# Patient Record
Sex: FEMALE | Race: WHITE | Employment: OTHER | URBAN - METROPOLITAN AREA
[De-identification: names, ages, dates, MRNs, and addresses within clinical notes are randomized per-mention and may not be internally consistent; named-entity substitution may affect disease eponyms.]

---

## 2020-10-25 ENCOUNTER — HOSPITAL ENCOUNTER (EMERGENCY)
Age: 75
Discharge: HOME | End: 2020-10-25
Attending: EMERGENCY MEDICINE | Admitting: EMERGENCY MEDICINE
Payer: MEDICARE

## 2020-10-25 VITALS
HEIGHT: 59 IN | HEART RATE: 77 BPM | BODY MASS INDEX: 29.64 KG/M2 | TEMPERATURE: 97.5 F | DIASTOLIC BLOOD PRESSURE: 111 MMHG | OXYGEN SATURATION: 100 % | RESPIRATION RATE: 20 BRPM | SYSTOLIC BLOOD PRESSURE: 168 MMHG | WEIGHT: 147 LBS

## 2020-10-25 DIAGNOSIS — I10 ESSENTIAL HYPERTENSION: ICD-10-CM

## 2020-10-25 DIAGNOSIS — R04.0 EPISTAXIS: Primary | ICD-10-CM

## 2020-10-25 PROCEDURE — 74011000250 HC RX REV CODE- 250: Performed by: EMERGENCY MEDICINE

## 2020-10-25 PROCEDURE — 99284 EMERGENCY DEPT VISIT MOD MDM: CPT

## 2020-10-25 RX ORDER — TRANEXAMIC ACID 100 MG/ML
INJECTION, SOLUTION INTRAVENOUS
Status: DISCONTINUED
Start: 2020-10-25 | End: 2020-10-25 | Stop reason: WASHOUT

## 2020-10-25 RX ORDER — TRANEXAMIC ACID 100 MG/ML
INJECTION, SOLUTION INTRAVENOUS
Status: DISCONTINUED
Start: 2020-10-25 | End: 2020-10-25 | Stop reason: HOSPADM

## 2020-10-25 RX ADMIN — WATER 10 ML: 1 INJECTION INTRAMUSCULAR; INTRAVENOUS; SUBCUTANEOUS at 01:30

## 2020-10-25 NOTE — ED PROVIDER NOTES
EMERGENCY DEPARTMENT HISTORY AND PHYSICAL EXAM    Date: 10/25/2020  Patient Name: Cesar Allen    History of Presenting Illness     Chief Complaint   Patient presents with    Epistaxis         History Provided By: Patient    Cesar Allen is a 77 y.o. female who presents to the emergency department C/O nosebleed. Patient states the symptoms started earlier this week and have been going on on and off. She states she has never had nosebleeds before but does have a history of hypertension and diabetes. Denies being on any blood thinning medications. States she was seen at a different facility with [facility] on Tuesday and was treated with Afrin but did not have her nose packed as the bleeding had subsided. She states the bleeding started back up again tonight just about an hour prior to arrival.  No other complaints    PCP: Korey Dobbins MD        Past History     Past Medical History:  Past Medical History:   Diagnosis Date    Diabetes ([address]     Hypertension        Past Surgical History:  History reviewed. No pertinent surgical history. Family History:  History reviewed. No pertinent family history. Social History:  Social History     Tobacco Use    Smoking status: Current Every Day Smoker    Smokeless tobacco: Never Used   Substance Use Topics    Alcohol use: Not on file    Drug use: Not on file       Allergies: Allergies   Allergen Reactions    Codeine Other (comments)    Sulfa (Sulfonamide Antibiotics) Other (comments)         Review of Systems   Review of Systems   Constitutional: Negative for fever. HENT: Positive for nosebleeds. Negative for congestion, trouble swallowing and voice change. Respiratory: Negative for shortness of breath. Cardiovascular: Negative for chest pain. Gastrointestinal: Negative for abdominal pain, nausea and vomiting. All other systems reviewed and are negative. All other systems reviewed and are negative.     Physical Exam Vitals:    10/25/20 0125 10/25/20 0129   BP: (!) 168/111    Pulse: 77    Resp: 20    Temp:  97.5 °F (36.4 °C)   SpO2: 100%    Weight: 66.7 kg (147 lb)    Height: 4' 11\" (1.499 m)      Physical Exam    Nursing notes and vital signs reviewed    Airway: intact, speaking normally  Breathing: No apparent distress, no cyanosis  Circulation: Peripheral pulses equal    Constitutional: Non toxic appearing, no acute distress, appearing stated age  HEENT:  Head: Normocephalic, Atraumatic  Eyes: PERRL, EOMI, No conjunctival injection  Ears: external ears normal  Nose: There is active epistaxis worse on the left side although there is a moderate amount of postnasal bleeding with the patient spitting up blood. Throat: mucous membranes moist, blood in the posterior oropharynx  Neck: symmetric, trachea midline, no obvious swelling, no JVD  Cardiovascular: Regular rate and rhythm, no murmurs  Lungs: Clear to ausculation bilaterally, No stridor, Normal work of breathing and chest excursion bilaterally  Abdomen: Soft, non tender, non distended, normoactive bowel sounds, No rigidity, no peritoneal signs  Musculoskeletal:  No evidence of obvious deformity to the back, neck or extremities, no LE edema  Skin: Warm, dry, No obvious rashes  Neuro: Alert and oriented x 3, CN 2-12 intact, normal speech, strength and sensation full and symmetric bilaterally  Psychiatric: Normal mood and affect      Diagnostic Study Results     Labs -   No results found for this or any previous visit (from the past 72 hour(s)).     Radiologic Studies -   No orders to display     CT Results  (Last 48 hours)    None        CXR Results  (Last 48 hours)    None          Medications given in the ED-  Medications   tranexamic acid (CYKLOKAPRON) 5% in sterile water oral solution (10 mL IntraNASal Given 10/25/20 0130)         Medical Decision Making     I reviewed the vital signs, available nursing notes, past medical history, past surgical history, family history and social history. Vital Signs interpretation- I have reviewed the patient's vital signs. Pulse Oximetry interpretation - 100% on Room air     Cardiac Monitor interpretation:  Rate: 77 bpm  Rhythm: sinus    Records Reviewed: Nursing Notes and Old Medical Records    Procedures:  Procedures    ED Course & MDM:   Considering the patient has hypertension will try TXA rather than Afrin. Will consider packing the patient's nose if no improvement     Patient symptoms improved. No further bleeding. Patient to be discharged with outpatient follow up with her ENT doctor on Tuesday. Recommended come back to ED if symptoms recur or worsen. Diagnosis and Disposition         DISCHARGE NOTE:    Tal Jason  results have been reviewed with her. She has been counseled regarding her diagnosis, treatment, and plan. She verbally conveys understanding and agreement of the signs, symptoms, diagnosis, treatment and prognosis and additionally agrees to follow up as discussed. She also agrees with the care-plan and conveys that all of her questions have been answered. I have also provided discharge instructions for her that include: educational information regarding their diagnosis and treatment, and list of reasons why they would want to return to the ED prior to their follow-up appointment, should her condition change. She has been provided with education for proper emergency department utilization. CLINICAL IMPRESSION:    1. Epistaxis    2. Essential hypertension        PLAN:  1. D/C Home  2. There are no discharge medications for this patient.     3.   Follow-up Information     Follow up With Specialties Details Why Contact Info    Your ENT scheduled appointment  Go to       [facility] EMERGENCY DEPT Emergency Medicine Go to  If symptoms worsen [address] [facility] [address]  [phone]        _______________________________      Please note that this dictation was completed with brendon Thurman computer voice recognition software. Quite often unanticipated grammatical, syntax, homophones, and other interpretive errors are inadvertently transcribed by the computer software. Please disregard these errors. Please excuse any errors that have escaped final proofreading.

## 2020-10-25 NOTE — ED TRIAGE NOTES
Pt c/o epistaxis on and off since Tuesday;   Pt sts she was seen tues at [facility]

## 2022-03-18 PROBLEM — I10 ESSENTIAL HYPERTENSION: Status: ACTIVE | Noted: 2020-10-25

## 2022-03-20 PROBLEM — R04.0 EPISTAXIS: Status: ACTIVE | Noted: 2020-10-25

## 2023-04-08 ENCOUNTER — APPOINTMENT (OUTPATIENT)
Facility: HOSPITAL | Age: 78
DRG: 682 | End: 2023-04-08
Payer: MEDICARE

## 2023-04-08 ENCOUNTER — HOSPITAL ENCOUNTER (INPATIENT)
Facility: HOSPITAL | Age: 78
LOS: 9 days | Discharge: NURSING, REHAB OR LONG-TERM CARE | DRG: 682 | End: 2023-04-17
Attending: STUDENT IN AN ORGANIZED HEALTH CARE EDUCATION/TRAINING PROGRAM | Admitting: FAMILY MEDICINE
Payer: MEDICARE

## 2023-04-08 DIAGNOSIS — R29.6 FREQUENT FALLS: ICD-10-CM

## 2023-04-08 DIAGNOSIS — T79.6XXA TRAUMATIC RHABDOMYOLYSIS, INITIAL ENCOUNTER (HCC): Primary | ICD-10-CM

## 2023-04-08 DIAGNOSIS — N17.9 AKI (ACUTE KIDNEY INJURY) (HCC): ICD-10-CM

## 2023-04-08 DIAGNOSIS — F41.9 ANXIETY: ICD-10-CM

## 2023-04-08 PROBLEM — D64.9 ACUTE ANEMIA: Status: ACTIVE | Noted: 2023-04-08

## 2023-04-08 PROBLEM — M62.82 RHABDOMYOLYSIS: Status: ACTIVE | Noted: 2023-04-08

## 2023-04-08 PROBLEM — N39.0 UTI (URINARY TRACT INFECTION): Status: ACTIVE | Noted: 2023-04-08

## 2023-04-08 PROBLEM — R26.2 UNABLE TO AMBULATE: Status: ACTIVE | Noted: 2023-04-08

## 2023-04-08 PROBLEM — I10 ESSENTIAL HYPERTENSION: Status: ACTIVE | Noted: 2020-10-25

## 2023-04-08 PROBLEM — Z72.0 TOBACCO USE: Status: ACTIVE | Noted: 2023-04-08

## 2023-04-08 PROBLEM — E11.9 TYPE 2 DIABETES MELLITUS (HCC): Status: ACTIVE | Noted: 2023-04-08

## 2023-04-08 PROBLEM — N18.30 CKD STAGE 3 DUE TO TYPE 2 DIABETES MELLITUS (HCC): Status: ACTIVE | Noted: 2023-04-08

## 2023-04-08 PROBLEM — E11.22 CKD STAGE 3 DUE TO TYPE 2 DIABETES MELLITUS (HCC): Status: ACTIVE | Noted: 2023-04-08

## 2023-04-08 LAB
ALBUMIN SERPL-MCNC: 4.1 G/DL (ref 3.4–5)
ALBUMIN/GLOB SERPL: 1 (ref 0.8–1.7)
ALP SERPL-CCNC: 103 U/L (ref 45–117)
ALT SERPL-CCNC: 134 U/L (ref 13–56)
ANION GAP SERPL CALC-SCNC: 8 MMOL/L (ref 3–18)
APPEARANCE UR: CLEAR
AST SERPL-CCNC: 390 U/L (ref 10–38)
BACTERIA URNS QL MICRO: ABNORMAL /HPF
BASOPHILS # BLD: 0 K/UL (ref 0–0.1)
BASOPHILS NFR BLD: 0 % (ref 0–2)
BILIRUB SERPL-MCNC: 0.5 MG/DL (ref 0.2–1)
BILIRUB UR QL: NEGATIVE
BUN SERPL-MCNC: 58 MG/DL (ref 7–18)
BUN/CREAT SERPL: 17 (ref 12–20)
CALCIUM SERPL-MCNC: 10.1 MG/DL (ref 8.5–10.1)
CHLORIDE SERPL-SCNC: 110 MMOL/L (ref 100–111)
CK SERPL-CCNC: ABNORMAL U/L (ref 26–192)
CO2 SERPL-SCNC: 19 MMOL/L (ref 21–32)
COLOR UR: YELLOW
CREAT SERPL-MCNC: 3.36 MG/DL (ref 0.6–1.3)
DIFFERENTIAL METHOD BLD: ABNORMAL
EOSINOPHIL # BLD: 0 K/UL (ref 0–0.4)
EOSINOPHIL NFR BLD: 0 % (ref 0–5)
EPITH CASTS URNS QL MICRO: ABNORMAL /LPF (ref 0–5)
ERYTHROCYTE [DISTWIDTH] IN BLOOD BY AUTOMATED COUNT: 12.9 % (ref 11.6–14.5)
GLOBULIN SER CALC-MCNC: 4 G/DL (ref 2–4)
GLUCOSE BLD STRIP.AUTO-MCNC: 143 MG/DL (ref 70–110)
GLUCOSE SERPL-MCNC: 143 MG/DL (ref 74–99)
GLUCOSE UR STRIP.AUTO-MCNC: NEGATIVE MG/DL
HCT VFR BLD AUTO: 24.2 % (ref 35–45)
HGB BLD-MCNC: 8.2 G/DL (ref 12–16)
HGB UR QL STRIP: ABNORMAL
IMM GRANULOCYTES # BLD AUTO: 0.1 K/UL (ref 0–0.04)
IMM GRANULOCYTES NFR BLD AUTO: 0 % (ref 0–0.5)
KETONES UR QL STRIP.AUTO: NEGATIVE MG/DL
LACTATE SERPL-SCNC: 1.1 MMOL/L (ref 0.4–2)
LACTATE SERPL-SCNC: 1.3 MMOL/L (ref 0.4–2)
LEUKOCYTE ESTERASE UR QL STRIP.AUTO: ABNORMAL
LYMPHOCYTES # BLD: 1.5 K/UL (ref 0.9–3.6)
LYMPHOCYTES NFR BLD: 11 % (ref 21–52)
MAGNESIUM SERPL-MCNC: 2.2 MG/DL (ref 1.6–2.6)
MCH RBC QN AUTO: 32.8 PG (ref 24–34)
MCHC RBC AUTO-ENTMCNC: 33.9 G/DL (ref 31–37)
MCV RBC AUTO: 96.8 FL (ref 78–100)
MONOCYTES # BLD: 0.6 K/UL (ref 0.05–1.2)
MONOCYTES NFR BLD: 4 % (ref 3–10)
NEUTS SEG # BLD: 11.5 K/UL (ref 1.8–8)
NEUTS SEG NFR BLD: 84 % (ref 40–73)
NITRITE UR QL STRIP.AUTO: NEGATIVE
NRBC # BLD: 0 K/UL (ref 0–0.01)
NRBC BLD-RTO: 0 PER 100 WBC
PH UR STRIP: 6 (ref 5–8)
PLATELET # BLD AUTO: 331 K/UL (ref 135–420)
PMV BLD AUTO: 10.6 FL (ref 9.2–11.8)
POTASSIUM SERPL-SCNC: 4.4 MMOL/L (ref 3.5–5.5)
PROT SERPL-MCNC: 8.1 G/DL (ref 6.4–8.2)
PROT UR STRIP-MCNC: 300 MG/DL
RBC # BLD AUTO: 2.5 M/UL (ref 4.2–5.3)
RBC #/AREA URNS HPF: ABNORMAL /HPF (ref 0–5)
SARS-COV-2 RDRP RESP QL NAA+PROBE: NOT DETECTED
SODIUM SERPL-SCNC: 137 MMOL/L (ref 136–145)
SOURCE: NORMAL
SP GR UR REFRACTOMETRY: 1.01 (ref 1–1.03)
UROBILINOGEN UR QL STRIP.AUTO: 0.2 EU/DL (ref 0.2–1)
WBC # BLD AUTO: 13.7 K/UL (ref 4.6–13.2)
WBC URNS QL MICRO: ABNORMAL /HPF (ref 0–5)

## 2023-04-08 PROCEDURE — 2580000003 HC RX 258: Performed by: STUDENT IN AN ORGANIZED HEALTH CARE EDUCATION/TRAINING PROGRAM

## 2023-04-08 PROCEDURE — 2580000003 HC RX 258: Performed by: FAMILY MEDICINE

## 2023-04-08 PROCEDURE — 76705 ECHO EXAM OF ABDOMEN: CPT

## 2023-04-08 PROCEDURE — 72100 X-RAY EXAM L-S SPINE 2/3 VWS: CPT

## 2023-04-08 PROCEDURE — 6370000000 HC RX 637 (ALT 250 FOR IP): Performed by: FAMILY MEDICINE

## 2023-04-08 PROCEDURE — 87635 SARS-COV-2 COVID-19 AMP PRB: CPT

## 2023-04-08 PROCEDURE — 6360000002 HC RX W HCPCS: Performed by: FAMILY MEDICINE

## 2023-04-08 PROCEDURE — 6370000000 HC RX 637 (ALT 250 FOR IP): Performed by: STUDENT IN AN ORGANIZED HEALTH CARE EDUCATION/TRAINING PROGRAM

## 2023-04-08 PROCEDURE — 73521 X-RAY EXAM HIPS BI 2 VIEWS: CPT

## 2023-04-08 PROCEDURE — 96374 THER/PROPH/DIAG INJ IV PUSH: CPT

## 2023-04-08 PROCEDURE — 80053 COMPREHEN METABOLIC PANEL: CPT

## 2023-04-08 PROCEDURE — 85025 COMPLETE CBC W/AUTO DIFF WBC: CPT

## 2023-04-08 PROCEDURE — 83735 ASSAY OF MAGNESIUM: CPT

## 2023-04-08 PROCEDURE — 81001 URINALYSIS AUTO W/SCOPE: CPT

## 2023-04-08 PROCEDURE — 6360000002 HC RX W HCPCS: Performed by: STUDENT IN AN ORGANIZED HEALTH CARE EDUCATION/TRAINING PROGRAM

## 2023-04-08 PROCEDURE — 73560 X-RAY EXAM OF KNEE 1 OR 2: CPT

## 2023-04-08 PROCEDURE — 93005 ELECTROCARDIOGRAM TRACING: CPT | Performed by: STUDENT IN AN ORGANIZED HEALTH CARE EDUCATION/TRAINING PROGRAM

## 2023-04-08 PROCEDURE — 83540 ASSAY OF IRON: CPT

## 2023-04-08 PROCEDURE — 83605 ASSAY OF LACTIC ACID: CPT

## 2023-04-08 PROCEDURE — 87086 URINE CULTURE/COLONY COUNT: CPT

## 2023-04-08 PROCEDURE — 1100000000 HC RM PRIVATE

## 2023-04-08 PROCEDURE — 82550 ASSAY OF CK (CPK): CPT

## 2023-04-08 PROCEDURE — 99285 EMERGENCY DEPT VISIT HI MDM: CPT

## 2023-04-08 PROCEDURE — 87040 BLOOD CULTURE FOR BACTERIA: CPT

## 2023-04-08 PROCEDURE — 82962 GLUCOSE BLOOD TEST: CPT

## 2023-04-08 RX ORDER — ONDANSETRON 2 MG/ML
4 INJECTION INTRAMUSCULAR; INTRAVENOUS EVERY 6 HOURS PRN
Status: DISCONTINUED | OUTPATIENT
Start: 2023-04-08 | End: 2023-04-17 | Stop reason: HOSPADM

## 2023-04-08 RX ORDER — ATENOLOL 50 MG/1
50 TABLET ORAL
Status: DISCONTINUED | OUTPATIENT
Start: 2023-04-08 | End: 2023-04-17 | Stop reason: HOSPADM

## 2023-04-08 RX ORDER — ATENOLOL 50 MG/1
50 TABLET ORAL DAILY
COMMUNITY
Start: 2020-06-15

## 2023-04-08 RX ORDER — LOSARTAN POTASSIUM 100 MG/1
1 TABLET ORAL DAILY
Status: ON HOLD | COMMUNITY
Start: 2020-07-25 | End: 2023-04-17 | Stop reason: HOSPADM

## 2023-04-08 RX ORDER — ALPRAZOLAM 0.5 MG/1
1 TABLET ORAL 2 TIMES DAILY PRN
Status: DISCONTINUED | OUTPATIENT
Start: 2023-04-08 | End: 2023-04-12

## 2023-04-08 RX ORDER — PANTOPRAZOLE SODIUM 40 MG/1
40 TABLET, DELAYED RELEASE ORAL
Status: DISCONTINUED | OUTPATIENT
Start: 2023-04-09 | End: 2023-04-17 | Stop reason: HOSPADM

## 2023-04-08 RX ORDER — INSULIN LISPRO 100 [IU]/ML
0-4 INJECTION, SOLUTION INTRAVENOUS; SUBCUTANEOUS NIGHTLY
Status: DISCONTINUED | OUTPATIENT
Start: 2023-04-08 | End: 2023-04-13

## 2023-04-08 RX ORDER — ALPRAZOLAM 1 MG/1
1 TABLET ORAL 3 TIMES DAILY
Status: ON HOLD | COMMUNITY
Start: 2020-10-12 | End: 2023-04-17 | Stop reason: SDUPTHER

## 2023-04-08 RX ORDER — GUANFACINE 1 MG/1
2 TABLET ORAL DAILY
Status: DISCONTINUED | OUTPATIENT
Start: 2023-04-09 | End: 2023-04-17 | Stop reason: HOSPADM

## 2023-04-08 RX ORDER — DOXEPIN HYDROCHLORIDE 100 MG/1
100 CAPSULE ORAL NIGHTLY
COMMUNITY

## 2023-04-08 RX ORDER — ACETAMINOPHEN 650 MG/1
650 SUPPOSITORY RECTAL EVERY 6 HOURS PRN
Status: DISCONTINUED | OUTPATIENT
Start: 2023-04-08 | End: 2023-04-17 | Stop reason: HOSPADM

## 2023-04-08 RX ORDER — ROSUVASTATIN CALCIUM 20 MG/1
1 TABLET, COATED ORAL DAILY
Status: ON HOLD | COMMUNITY
Start: 2020-10-19 | End: 2023-04-17 | Stop reason: HOSPADM

## 2023-04-08 RX ORDER — ONDANSETRON 2 MG/ML
4 INJECTION INTRAMUSCULAR; INTRAVENOUS
Status: COMPLETED | OUTPATIENT
Start: 2023-04-08 | End: 2023-04-08

## 2023-04-08 RX ORDER — ACETAMINOPHEN 500 MG
1000 TABLET ORAL
Status: COMPLETED | OUTPATIENT
Start: 2023-04-08 | End: 2023-04-08

## 2023-04-08 RX ORDER — SODIUM CHLORIDE, SODIUM LACTATE, POTASSIUM CHLORIDE, AND CALCIUM CHLORIDE .6; .31; .03; .02 G/100ML; G/100ML; G/100ML; G/100ML
1000 INJECTION, SOLUTION INTRAVENOUS ONCE
Status: COMPLETED | OUTPATIENT
Start: 2023-04-08 | End: 2023-04-08

## 2023-04-08 RX ORDER — ONDANSETRON 4 MG/1
4 TABLET, ORALLY DISINTEGRATING ORAL EVERY 8 HOURS PRN
Status: DISCONTINUED | OUTPATIENT
Start: 2023-04-08 | End: 2023-04-17 | Stop reason: HOSPADM

## 2023-04-08 RX ORDER — SODIUM CHLORIDE 0.9 % (FLUSH) 0.9 %
5-40 SYRINGE (ML) INJECTION EVERY 12 HOURS SCHEDULED
Status: DISCONTINUED | OUTPATIENT
Start: 2023-04-08 | End: 2023-04-17 | Stop reason: HOSPADM

## 2023-04-08 RX ORDER — SODIUM CHLORIDE, SODIUM LACTATE, POTASSIUM CHLORIDE, CALCIUM CHLORIDE 600; 310; 30; 20 MG/100ML; MG/100ML; MG/100ML; MG/100ML
INJECTION, SOLUTION INTRAVENOUS CONTINUOUS
Status: DISCONTINUED | OUTPATIENT
Start: 2023-04-08 | End: 2023-04-10

## 2023-04-08 RX ORDER — HYDRALAZINE HYDROCHLORIDE 20 MG/ML
10 INJECTION INTRAMUSCULAR; INTRAVENOUS EVERY 6 HOURS PRN
Status: DISCONTINUED | OUTPATIENT
Start: 2023-04-08 | End: 2023-04-17 | Stop reason: HOSPADM

## 2023-04-08 RX ORDER — GUANFACINE 2 MG/1
2 TABLET ORAL DAILY
Status: ON HOLD | COMMUNITY
Start: 2020-10-06 | End: 2023-04-17 | Stop reason: HOSPADM

## 2023-04-08 RX ORDER — SODIUM CHLORIDE 0.9 % (FLUSH) 0.9 %
5-40 SYRINGE (ML) INJECTION PRN
Status: DISCONTINUED | OUTPATIENT
Start: 2023-04-08 | End: 2023-04-17 | Stop reason: HOSPADM

## 2023-04-08 RX ORDER — MORPHINE SULFATE 2 MG/ML
2 INJECTION, SOLUTION INTRAMUSCULAR; INTRAVENOUS
Status: COMPLETED | OUTPATIENT
Start: 2023-04-08 | End: 2023-04-08

## 2023-04-08 RX ORDER — ACETAMINOPHEN 500 MG
1000 TABLET ORAL EVERY 8 HOURS PRN
Status: DISCONTINUED | OUTPATIENT
Start: 2023-04-08 | End: 2023-04-17 | Stop reason: HOSPADM

## 2023-04-08 RX ORDER — HEPARIN SODIUM 5000 [USP'U]/ML
5000 INJECTION, SOLUTION INTRAVENOUS; SUBCUTANEOUS EVERY 8 HOURS SCHEDULED
Status: DISCONTINUED | OUTPATIENT
Start: 2023-04-08 | End: 2023-04-08

## 2023-04-08 RX ORDER — ACETAMINOPHEN 325 MG/1
650 TABLET ORAL EVERY 6 HOURS PRN
Status: DISCONTINUED | OUTPATIENT
Start: 2023-04-08 | End: 2023-04-17 | Stop reason: HOSPADM

## 2023-04-08 RX ORDER — KETOROLAC TROMETHAMINE 15 MG/ML
15 INJECTION, SOLUTION INTRAMUSCULAR; INTRAVENOUS ONCE
Status: COMPLETED | OUTPATIENT
Start: 2023-04-08 | End: 2023-04-08

## 2023-04-08 RX ORDER — SODIUM CHLORIDE 9 MG/ML
INJECTION, SOLUTION INTRAVENOUS PRN
Status: DISCONTINUED | OUTPATIENT
Start: 2023-04-08 | End: 2023-04-17 | Stop reason: HOSPADM

## 2023-04-08 RX ORDER — INSULIN LISPRO 100 [IU]/ML
0-4 INJECTION, SOLUTION INTRAVENOUS; SUBCUTANEOUS
Status: DISCONTINUED | OUTPATIENT
Start: 2023-04-09 | End: 2023-04-13

## 2023-04-08 RX ORDER — POLYETHYLENE GLYCOL 3350 17 G/17G
17 POWDER, FOR SOLUTION ORAL DAILY PRN
Status: DISCONTINUED | OUTPATIENT
Start: 2023-04-08 | End: 2023-04-17 | Stop reason: HOSPADM

## 2023-04-08 RX ADMIN — HYDRALAZINE HYDROCHLORIDE 10 MG: 20 INJECTION INTRAMUSCULAR; INTRAVENOUS at 21:49

## 2023-04-08 RX ADMIN — SODIUM CHLORIDE, PRESERVATIVE FREE 10 ML: 5 INJECTION INTRAVENOUS at 21:43

## 2023-04-08 RX ADMIN — ONDANSETRON 4 MG: 2 INJECTION INTRAMUSCULAR; INTRAVENOUS at 20:01

## 2023-04-08 RX ADMIN — ATENOLOL 50 MG: 50 TABLET ORAL at 20:45

## 2023-04-08 RX ADMIN — SODIUM CHLORIDE, POTASSIUM CHLORIDE, SODIUM LACTATE AND CALCIUM CHLORIDE 1000 ML: 600; 310; 30; 20 INJECTION, SOLUTION INTRAVENOUS at 17:27

## 2023-04-08 RX ADMIN — MORPHINE SULFATE 2 MG: 2 INJECTION, SOLUTION INTRAMUSCULAR; INTRAVENOUS at 20:01

## 2023-04-08 RX ADMIN — KETOROLAC TROMETHAMINE 15 MG: 15 INJECTION, SOLUTION INTRAMUSCULAR; INTRAVENOUS at 17:27

## 2023-04-08 RX ADMIN — ACETAMINOPHEN 1000 MG: 500 TABLET ORAL at 17:27

## 2023-04-08 ASSESSMENT — ENCOUNTER SYMPTOMS
SHORTNESS OF BREATH: 0
BACK PAIN: 1
ABDOMINAL PAIN: 0

## 2023-04-08 ASSESSMENT — PAIN SCALES - GENERAL
PAINLEVEL_OUTOF10: 8
PAINLEVEL_OUTOF10: 10

## 2023-04-08 ASSESSMENT — PAIN - FUNCTIONAL ASSESSMENT: PAIN_FUNCTIONAL_ASSESSMENT: 0-10

## 2023-04-08 ASSESSMENT — PAIN DESCRIPTION - LOCATION
LOCATION: BACK
LOCATION: BACK

## 2023-04-08 ASSESSMENT — PAIN DESCRIPTION - ORIENTATION: ORIENTATION: LOWER;MID

## 2023-04-09 ENCOUNTER — APPOINTMENT (OUTPATIENT)
Facility: HOSPITAL | Age: 78
DRG: 682 | End: 2023-04-09
Payer: MEDICARE

## 2023-04-09 LAB
ALBUMIN SERPL-MCNC: 3.2 G/DL (ref 3.4–5)
ALBUMIN/GLOB SERPL: 0.8 (ref 0.8–1.7)
ALP SERPL-CCNC: 84 U/L (ref 45–117)
ALT SERPL-CCNC: 123 U/L (ref 13–56)
ANION GAP SERPL CALC-SCNC: 5 MMOL/L (ref 3–18)
AST SERPL-CCNC: 338 U/L (ref 10–38)
BASOPHILS # BLD: 0 K/UL (ref 0–0.1)
BASOPHILS NFR BLD: 0 % (ref 0–2)
BILIRUB SERPL-MCNC: 0.4 MG/DL (ref 0.2–1)
BUN SERPL-MCNC: 57 MG/DL (ref 7–18)
BUN/CREAT SERPL: 18 (ref 12–20)
CALCIUM SERPL-MCNC: 9.2 MG/DL (ref 8.5–10.1)
CHLORIDE SERPL-SCNC: 111 MMOL/L (ref 100–111)
CK SERPL-CCNC: 9674 U/L (ref 26–192)
CO2 SERPL-SCNC: 22 MMOL/L (ref 21–32)
CREAT SERPL-MCNC: 3.09 MG/DL (ref 0.6–1.3)
DIFFERENTIAL METHOD BLD: ABNORMAL
EKG ATRIAL RATE: 85 BPM
EKG DIAGNOSIS: NORMAL
EKG P AXIS: 51 DEGREES
EKG P-R INTERVAL: 192 MS
EKG Q-T INTERVAL: 374 MS
EKG QRS DURATION: 90 MS
EKG QTC CALCULATION (BAZETT): 445 MS
EKG R AXIS: 21 DEGREES
EKG T AXIS: 12 DEGREES
EKG VENTRICULAR RATE: 85 BPM
EOSINOPHIL # BLD: 0 K/UL (ref 0–0.4)
EOSINOPHIL NFR BLD: 0 % (ref 0–5)
ERYTHROCYTE [DISTWIDTH] IN BLOOD BY AUTOMATED COUNT: 13.2 % (ref 11.6–14.5)
GLOBULIN SER CALC-MCNC: 3.8 G/DL (ref 2–4)
GLUCOSE BLD STRIP.AUTO-MCNC: 149 MG/DL (ref 70–110)
GLUCOSE BLD STRIP.AUTO-MCNC: 169 MG/DL (ref 70–110)
GLUCOSE BLD STRIP.AUTO-MCNC: 180 MG/DL (ref 70–110)
GLUCOSE BLD STRIP.AUTO-MCNC: 191 MG/DL (ref 70–110)
GLUCOSE SERPL-MCNC: 135 MG/DL (ref 74–99)
HCT VFR BLD AUTO: 32.7 % (ref 35–45)
HGB BLD-MCNC: 11.2 G/DL (ref 12–16)
IMM GRANULOCYTES # BLD AUTO: 0 K/UL (ref 0–0.04)
IMM GRANULOCYTES NFR BLD AUTO: 0 % (ref 0–0.5)
IRON SATN MFR SERPL: 14 % (ref 20–50)
IRON SERPL-MCNC: 50 UG/DL (ref 50–175)
LYMPHOCYTES # BLD: 2.2 K/UL (ref 0.9–3.6)
LYMPHOCYTES NFR BLD: 29 % (ref 21–52)
MCH RBC QN AUTO: 33.4 PG (ref 24–34)
MCHC RBC AUTO-ENTMCNC: 34.3 G/DL (ref 31–37)
MCV RBC AUTO: 97.6 FL (ref 78–100)
MONOCYTES # BLD: 0.3 K/UL (ref 0.05–1.2)
MONOCYTES NFR BLD: 4 % (ref 3–10)
NEUTS SEG # BLD: 4.9 K/UL (ref 1.8–8)
NEUTS SEG NFR BLD: 66 % (ref 40–73)
NRBC # BLD: 0 K/UL (ref 0–0.01)
NRBC BLD-RTO: 0 PER 100 WBC
PLATELET # BLD AUTO: 221 K/UL (ref 135–420)
PMV BLD AUTO: 10 FL (ref 9.2–11.8)
POTASSIUM SERPL-SCNC: 4.2 MMOL/L (ref 3.5–5.5)
PROT SERPL-MCNC: 7 G/DL (ref 6.4–8.2)
RBC # BLD AUTO: 3.35 M/UL (ref 4.2–5.3)
SODIUM SERPL-SCNC: 138 MMOL/L (ref 136–145)
TIBC SERPL-MCNC: 353 UG/DL (ref 250–450)
WBC # BLD AUTO: 7.4 K/UL (ref 4.6–13.2)

## 2023-04-09 PROCEDURE — 82550 ASSAY OF CK (CPK): CPT

## 2023-04-09 PROCEDURE — 36415 COLL VENOUS BLD VENIPUNCTURE: CPT

## 2023-04-09 PROCEDURE — 93010 ELECTROCARDIOGRAM REPORT: CPT | Performed by: INTERNAL MEDICINE

## 2023-04-09 PROCEDURE — 6370000000 HC RX 637 (ALT 250 FOR IP): Performed by: FAMILY MEDICINE

## 2023-04-09 PROCEDURE — 76770 US EXAM ABDO BACK WALL COMP: CPT

## 2023-04-09 PROCEDURE — 2580000003 HC RX 258: Performed by: FAMILY MEDICINE

## 2023-04-09 PROCEDURE — 80053 COMPREHEN METABOLIC PANEL: CPT

## 2023-04-09 PROCEDURE — 1100000000 HC RM PRIVATE

## 2023-04-09 PROCEDURE — 6360000002 HC RX W HCPCS: Performed by: FAMILY MEDICINE

## 2023-04-09 PROCEDURE — 85025 COMPLETE CBC W/AUTO DIFF WBC: CPT

## 2023-04-09 PROCEDURE — 82962 GLUCOSE BLOOD TEST: CPT

## 2023-04-09 RX ORDER — NIFEDIPINE 30 MG/1
30 TABLET, EXTENDED RELEASE ORAL DAILY
Status: DISCONTINUED | OUTPATIENT
Start: 2023-04-09 | End: 2023-04-17 | Stop reason: HOSPADM

## 2023-04-09 RX ORDER — DOXEPIN HYDROCHLORIDE 50 MG/1
100 CAPSULE ORAL NIGHTLY
Status: DISCONTINUED | OUTPATIENT
Start: 2023-04-09 | End: 2023-04-17 | Stop reason: HOSPADM

## 2023-04-09 RX ADMIN — SODIUM CHLORIDE, PRESERVATIVE FREE 10 ML: 5 INJECTION INTRAVENOUS at 11:29

## 2023-04-09 RX ADMIN — SODIUM CHLORIDE, PRESERVATIVE FREE 5 ML: 5 INJECTION INTRAVENOUS at 21:37

## 2023-04-09 RX ADMIN — ALPRAZOLAM 1 MG: 0.5 TABLET ORAL at 11:28

## 2023-04-09 RX ADMIN — HYDRALAZINE HYDROCHLORIDE 10 MG: 20 INJECTION INTRAMUSCULAR; INTRAVENOUS at 19:57

## 2023-04-09 RX ADMIN — CEFTRIAXONE 1000 MG: 1 INJECTION, POWDER, FOR SOLUTION INTRAMUSCULAR; INTRAVENOUS at 21:23

## 2023-04-09 RX ADMIN — CEFTRIAXONE 1000 MG: 1 INJECTION, POWDER, FOR SOLUTION INTRAMUSCULAR; INTRAVENOUS at 02:13

## 2023-04-09 RX ADMIN — DOXEPIN HYDROCHLORIDE 100 MG: 50 CAPSULE ORAL at 21:23

## 2023-04-09 RX ADMIN — SODIUM CHLORIDE, POTASSIUM CHLORIDE, SODIUM LACTATE AND CALCIUM CHLORIDE: 600; 310; 30; 20 INJECTION, SOLUTION INTRAVENOUS at 02:37

## 2023-04-09 RX ADMIN — GUANFACINE 2 MG: 1 TABLET ORAL at 09:24

## 2023-04-09 RX ADMIN — ATENOLOL 50 MG: 50 TABLET ORAL at 23:05

## 2023-04-09 RX ADMIN — PANTOPRAZOLE SODIUM 40 MG: 40 TABLET, DELAYED RELEASE ORAL at 06:54

## 2023-04-09 RX ADMIN — DOXEPIN HYDROCHLORIDE 100 MG: 50 CAPSULE ORAL at 00:44

## 2023-04-09 RX ADMIN — ALPRAZOLAM 1 MG: 0.5 TABLET ORAL at 01:07

## 2023-04-09 RX ADMIN — SODIUM CHLORIDE, POTASSIUM CHLORIDE, SODIUM LACTATE AND CALCIUM CHLORIDE: 600; 310; 30; 20 INJECTION, SOLUTION INTRAVENOUS at 20:14

## 2023-04-09 RX ADMIN — ACETAMINOPHEN 1000 MG: 500 TABLET ORAL at 20:13

## 2023-04-09 ASSESSMENT — PAIN DESCRIPTION - LOCATION: LOCATION: ABDOMEN

## 2023-04-09 ASSESSMENT — PAIN SCALES - GENERAL
PAINLEVEL_OUTOF10: 3
PAINLEVEL_OUTOF10: 8
PAINLEVEL_OUTOF10: 0

## 2023-04-10 ENCOUNTER — APPOINTMENT (OUTPATIENT)
Facility: HOSPITAL | Age: 78
DRG: 682 | End: 2023-04-10
Payer: MEDICARE

## 2023-04-10 LAB
ALBUMIN SERPL-MCNC: 3.4 G/DL (ref 3.4–5)
ALBUMIN/GLOB SERPL: 0.9 (ref 0.8–1.7)
ALP SERPL-CCNC: 98 U/L (ref 45–117)
ALT SERPL-CCNC: 127 U/L (ref 13–56)
ANION GAP SERPL CALC-SCNC: 10 MMOL/L (ref 3–18)
AST SERPL-CCNC: 270 U/L (ref 10–38)
BACTERIA SPEC CULT: NORMAL
BASOPHILS # BLD: 0 K/UL (ref 0–0.1)
BASOPHILS NFR BLD: 1 % (ref 0–2)
BILIRUB SERPL-MCNC: 0.4 MG/DL (ref 0.2–1)
BUN SERPL-MCNC: 63 MG/DL (ref 7–18)
BUN/CREAT SERPL: 19 (ref 12–20)
CALCIUM SERPL-MCNC: 9.3 MG/DL (ref 8.5–10.1)
CALCIUM SERPL-MCNC: 9.3 MG/DL (ref 8.5–10.1)
CHLORIDE SERPL-SCNC: 109 MMOL/L (ref 100–111)
CK SERPL-CCNC: 5129 U/L (ref 26–192)
CO2 SERPL-SCNC: 16 MMOL/L (ref 21–32)
CREAT SERPL-MCNC: 3.38 MG/DL (ref 0.6–1.3)
DIFFERENTIAL METHOD BLD: ABNORMAL
EOSINOPHIL # BLD: 0.1 K/UL (ref 0–0.4)
EOSINOPHIL NFR BLD: 1 % (ref 0–5)
ERYTHROCYTE [DISTWIDTH] IN BLOOD BY AUTOMATED COUNT: 13 % (ref 11.6–14.5)
FERRITIN SERPL-MCNC: 147 NG/ML (ref 8–388)
GLOBULIN SER CALC-MCNC: 3.7 G/DL (ref 2–4)
GLUCOSE BLD STRIP.AUTO-MCNC: 154 MG/DL (ref 70–110)
GLUCOSE BLD STRIP.AUTO-MCNC: 168 MG/DL (ref 70–110)
GLUCOSE BLD STRIP.AUTO-MCNC: 222 MG/DL (ref 70–110)
GLUCOSE BLD STRIP.AUTO-MCNC: 247 MG/DL (ref 70–110)
GLUCOSE SERPL-MCNC: 124 MG/DL (ref 74–99)
HBA1C MFR BLD: 6.3 % (ref 4.2–5.6)
HCT VFR BLD AUTO: 34.6 % (ref 35–45)
HGB BLD-MCNC: 11.7 G/DL (ref 12–16)
IMM GRANULOCYTES # BLD AUTO: 0 K/UL (ref 0–0.04)
IMM GRANULOCYTES NFR BLD AUTO: 0 % (ref 0–0.5)
LYMPHOCYTES # BLD: 2 K/UL (ref 0.9–3.6)
LYMPHOCYTES NFR BLD: 24 % (ref 21–52)
MCH RBC QN AUTO: 32.7 PG (ref 24–34)
MCHC RBC AUTO-ENTMCNC: 33.8 G/DL (ref 31–37)
MCV RBC AUTO: 96.6 FL (ref 78–100)
MONOCYTES # BLD: 0.4 K/UL (ref 0.05–1.2)
MONOCYTES NFR BLD: 5 % (ref 3–10)
NEUTS SEG # BLD: 5.7 K/UL (ref 1.8–8)
NEUTS SEG NFR BLD: 69 % (ref 40–73)
NRBC # BLD: 0 K/UL (ref 0–0.01)
NRBC BLD-RTO: 0 PER 100 WBC
PHOSPHATE SERPL-MCNC: 5.1 MG/DL (ref 2.5–4.9)
PLATELET # BLD AUTO: 214 K/UL (ref 135–420)
PMV BLD AUTO: 10.2 FL (ref 9.2–11.8)
POTASSIUM SERPL-SCNC: 4.3 MMOL/L (ref 3.5–5.5)
PROT SERPL-MCNC: 7.1 G/DL (ref 6.4–8.2)
PTH-INTACT SERPL-MCNC: 68.1 PG/ML (ref 18.4–88)
RBC # BLD AUTO: 3.58 M/UL (ref 4.2–5.3)
RHEUMATOID FACT SERPL-ACNC: 80 IU/ML
SERVICE CMNT-IMP: NORMAL
SODIUM SERPL-SCNC: 135 MMOL/L (ref 136–145)
WBC # BLD AUTO: 8.3 K/UL (ref 4.6–13.2)

## 2023-04-10 PROCEDURE — 70551 MRI BRAIN STEM W/O DYE: CPT

## 2023-04-10 PROCEDURE — 2580000003 HC RX 258: Performed by: FAMILY MEDICINE

## 2023-04-10 PROCEDURE — 86803 HEPATITIS C AB TEST: CPT

## 2023-04-10 PROCEDURE — 2500000003 HC RX 250 WO HCPCS: Performed by: INTERNAL MEDICINE

## 2023-04-10 PROCEDURE — 82962 GLUCOSE BLOOD TEST: CPT

## 2023-04-10 PROCEDURE — 82550 ASSAY OF CK (CPK): CPT

## 2023-04-10 PROCEDURE — 87340 HEPATITIS B SURFACE AG IA: CPT

## 2023-04-10 PROCEDURE — 85025 COMPLETE CBC W/AUTO DIFF WBC: CPT

## 2023-04-10 PROCEDURE — 97162 PT EVAL MOD COMPLEX 30 MIN: CPT

## 2023-04-10 PROCEDURE — 83970 ASSAY OF PARATHORMONE: CPT

## 2023-04-10 PROCEDURE — 6370000000 HC RX 637 (ALT 250 FOR IP): Performed by: HOSPITALIST

## 2023-04-10 PROCEDURE — 1100000000 HC RM PRIVATE

## 2023-04-10 PROCEDURE — 2580000003 HC RX 258: Performed by: INTERNAL MEDICINE

## 2023-04-10 PROCEDURE — 83036 HEMOGLOBIN GLYCOSYLATED A1C: CPT

## 2023-04-10 PROCEDURE — 6360000002 HC RX W HCPCS: Performed by: FAMILY MEDICINE

## 2023-04-10 PROCEDURE — 86038 ANTINUCLEAR ANTIBODIES: CPT

## 2023-04-10 PROCEDURE — 84100 ASSAY OF PHOSPHORUS: CPT

## 2023-04-10 PROCEDURE — 86431 RHEUMATOID FACTOR QUANT: CPT

## 2023-04-10 PROCEDURE — 80053 COMPREHEN METABOLIC PANEL: CPT

## 2023-04-10 PROCEDURE — 82784 ASSAY IGA/IGD/IGG/IGM EACH: CPT

## 2023-04-10 PROCEDURE — 36415 COLL VENOUS BLD VENIPUNCTURE: CPT

## 2023-04-10 PROCEDURE — 86706 HEP B SURFACE ANTIBODY: CPT

## 2023-04-10 PROCEDURE — 82728 ASSAY OF FERRITIN: CPT

## 2023-04-10 PROCEDURE — 6370000000 HC RX 637 (ALT 250 FOR IP): Performed by: FAMILY MEDICINE

## 2023-04-10 RX ORDER — HYDRALAZINE HYDROCHLORIDE 25 MG/1
25 TABLET, FILM COATED ORAL EVERY 8 HOURS SCHEDULED
Status: DISCONTINUED | OUTPATIENT
Start: 2023-04-10 | End: 2023-04-14

## 2023-04-10 RX ORDER — DEXTROSE MONOHYDRATE 100 MG/ML
INJECTION, SOLUTION INTRAVENOUS CONTINUOUS PRN
Status: DISCONTINUED | OUTPATIENT
Start: 2023-04-10 | End: 2023-04-17 | Stop reason: HOSPADM

## 2023-04-10 RX ORDER — INSULIN GLARGINE 100 [IU]/ML
7 INJECTION, SOLUTION SUBCUTANEOUS DAILY
Status: DISCONTINUED | OUTPATIENT
Start: 2023-04-10 | End: 2023-04-12

## 2023-04-10 RX ADMIN — HYDRALAZINE HYDROCHLORIDE 25 MG: 25 TABLET, FILM COATED ORAL at 21:38

## 2023-04-10 RX ADMIN — GUANFACINE 2 MG: 1 TABLET ORAL at 06:15

## 2023-04-10 RX ADMIN — DOXEPIN HYDROCHLORIDE 100 MG: 50 CAPSULE ORAL at 20:46

## 2023-04-10 RX ADMIN — SODIUM BICARBONATE: 84 INJECTION, SOLUTION INTRAVENOUS at 12:23

## 2023-04-10 RX ADMIN — INSULIN LISPRO 1 UNITS: 100 INJECTION, SOLUTION INTRAVENOUS; SUBCUTANEOUS at 17:22

## 2023-04-10 RX ADMIN — SODIUM CHLORIDE, PRESERVATIVE FREE 10 ML: 5 INJECTION INTRAVENOUS at 21:47

## 2023-04-10 RX ADMIN — ATENOLOL 50 MG: 50 TABLET ORAL at 21:38

## 2023-04-10 RX ADMIN — ACETAMINOPHEN 650 MG: 325 TABLET ORAL at 14:47

## 2023-04-10 RX ADMIN — SODIUM CHLORIDE, PRESERVATIVE FREE 10 ML: 5 INJECTION INTRAVENOUS at 12:24

## 2023-04-10 RX ADMIN — HYDRALAZINE HYDROCHLORIDE 25 MG: 25 TABLET, FILM COATED ORAL at 14:47

## 2023-04-10 RX ADMIN — CEFTRIAXONE 1000 MG: 1 INJECTION, POWDER, FOR SOLUTION INTRAMUSCULAR; INTRAVENOUS at 21:37

## 2023-04-10 RX ADMIN — ACETAMINOPHEN 1000 MG: 500 TABLET ORAL at 06:11

## 2023-04-10 RX ADMIN — PANTOPRAZOLE SODIUM 40 MG: 40 TABLET, DELAYED RELEASE ORAL at 06:15

## 2023-04-10 RX ADMIN — INSULIN LISPRO 1 UNITS: 100 INJECTION, SOLUTION INTRAVENOUS; SUBCUTANEOUS at 12:23

## 2023-04-10 RX ADMIN — ALPRAZOLAM 1 MG: 0.5 TABLET ORAL at 21:37

## 2023-04-10 RX ADMIN — ACETAMINOPHEN 650 MG: 325 TABLET ORAL at 21:37

## 2023-04-10 RX ADMIN — INSULIN GLARGINE 7 UNITS: 100 INJECTION, SOLUTION SUBCUTANEOUS at 14:49

## 2023-04-10 RX ADMIN — NIFEDIPINE 30 MG: 30 TABLET, EXTENDED RELEASE ORAL at 06:14

## 2023-04-10 ASSESSMENT — PAIN DESCRIPTION - LOCATION
LOCATION: LEG
LOCATION: BACK
LOCATION: ABDOMEN;BACK

## 2023-04-10 ASSESSMENT — PAIN DESCRIPTION - DESCRIPTORS: DESCRIPTORS: ACHING;SORE

## 2023-04-10 ASSESSMENT — PAIN SCALES - GENERAL
PAINLEVEL_OUTOF10: 8
PAINLEVEL_OUTOF10: 6
PAINLEVEL_OUTOF10: 10

## 2023-04-10 ASSESSMENT — PAIN DESCRIPTION - ORIENTATION
ORIENTATION: POSTERIOR;LOWER
ORIENTATION: RIGHT

## 2023-04-11 LAB
ALBUMIN SERPL-MCNC: 3 G/DL (ref 3.4–5)
ALBUMIN/GLOB SERPL: 0.9 (ref 0.8–1.7)
ALP SERPL-CCNC: 87 U/L (ref 45–117)
ALT SERPL-CCNC: 104 U/L (ref 13–56)
ANA TITR SER IF: NEGATIVE
ANION GAP SERPL CALC-SCNC: 8 MMOL/L (ref 3–18)
AST SERPL-CCNC: 171 U/L (ref 10–38)
BASOPHILS # BLD: 0 K/UL (ref 0–0.1)
BASOPHILS NFR BLD: 1 % (ref 0–2)
BILIRUB SERPL-MCNC: 0.5 MG/DL (ref 0.2–1)
BUN SERPL-MCNC: 64 MG/DL (ref 7–18)
BUN/CREAT SERPL: 18 (ref 12–20)
CALCIUM SERPL-MCNC: 8.8 MG/DL (ref 8.5–10.1)
CHLORIDE SERPL-SCNC: 105 MMOL/L (ref 100–111)
CK SERPL-CCNC: 2258 U/L (ref 26–192)
CO2 SERPL-SCNC: 24 MMOL/L (ref 21–32)
CREAT SERPL-MCNC: 3.6 MG/DL (ref 0.6–1.3)
DIFFERENTIAL METHOD BLD: ABNORMAL
EOSINOPHIL # BLD: 0.1 K/UL (ref 0–0.4)
EOSINOPHIL NFR BLD: 2 % (ref 0–5)
ERYTHROCYTE [DISTWIDTH] IN BLOOD BY AUTOMATED COUNT: 13.1 % (ref 11.6–14.5)
GLOBULIN SER CALC-MCNC: 3.5 G/DL (ref 2–4)
GLUCOSE BLD STRIP.AUTO-MCNC: 130 MG/DL (ref 70–110)
GLUCOSE BLD STRIP.AUTO-MCNC: 184 MG/DL (ref 70–110)
GLUCOSE BLD STRIP.AUTO-MCNC: 200 MG/DL (ref 70–110)
GLUCOSE BLD STRIP.AUTO-MCNC: 271 MG/DL (ref 70–110)
GLUCOSE SERPL-MCNC: 118 MG/DL (ref 74–99)
HBV SURFACE AB SER QL IA: NEGATIVE
HBV SURFACE AB SERPL IA-ACNC: <3.1 MIU/ML
HBV SURFACE AG SER QL: <0.1 INDEX
HBV SURFACE AG SER QL: NEGATIVE
HCT VFR BLD AUTO: 34.8 % (ref 35–45)
HCV AB SER IA-ACNC: <0.02 INDEX
HCV AB SERPL QL IA: NEGATIVE
HGB BLD-MCNC: 12 G/DL (ref 12–16)
IMM GRANULOCYTES # BLD AUTO: 0 K/UL (ref 0–0.04)
IMM GRANULOCYTES NFR BLD AUTO: 0 % (ref 0–0.5)
LABORATORY COMMENT REPORT: NORMAL
LYMPHOCYTES # BLD: 2 K/UL (ref 0.9–3.6)
LYMPHOCYTES NFR BLD: 28 % (ref 21–52)
Lab: ABNORMAL
Lab: NORMAL
MCH RBC QN AUTO: 33.1 PG (ref 24–34)
MCHC RBC AUTO-ENTMCNC: 34.5 G/DL (ref 31–37)
MCV RBC AUTO: 95.9 FL (ref 78–100)
MONOCYTES # BLD: 0.3 K/UL (ref 0.05–1.2)
MONOCYTES NFR BLD: 4 % (ref 3–10)
NEUTS SEG # BLD: 4.7 K/UL (ref 1.8–8)
NEUTS SEG NFR BLD: 66 % (ref 40–73)
NRBC # BLD: 0 K/UL (ref 0–0.01)
NRBC BLD-RTO: 0 PER 100 WBC
PLATELET # BLD AUTO: 205 K/UL (ref 135–420)
PMV BLD AUTO: 10.5 FL (ref 9.2–11.8)
POTASSIUM SERPL-SCNC: 3.9 MMOL/L (ref 3.5–5.5)
PROT SERPL-MCNC: 6.5 G/DL (ref 6.4–8.2)
RBC # BLD AUTO: 3.63 M/UL (ref 4.2–5.3)
SODIUM SERPL-SCNC: 137 MMOL/L (ref 136–145)
WBC # BLD AUTO: 7.2 K/UL (ref 4.6–13.2)

## 2023-04-11 PROCEDURE — 6370000000 HC RX 637 (ALT 250 FOR IP): Performed by: FAMILY MEDICINE

## 2023-04-11 PROCEDURE — 82962 GLUCOSE BLOOD TEST: CPT

## 2023-04-11 PROCEDURE — 97530 THERAPEUTIC ACTIVITIES: CPT

## 2023-04-11 PROCEDURE — 6360000002 HC RX W HCPCS: Performed by: FAMILY MEDICINE

## 2023-04-11 PROCEDURE — 36415 COLL VENOUS BLD VENIPUNCTURE: CPT

## 2023-04-11 PROCEDURE — 97166 OT EVAL MOD COMPLEX 45 MIN: CPT

## 2023-04-11 PROCEDURE — 6370000000 HC RX 637 (ALT 250 FOR IP): Performed by: HOSPITALIST

## 2023-04-11 PROCEDURE — 2580000003 HC RX 258: Performed by: INTERNAL MEDICINE

## 2023-04-11 PROCEDURE — 2500000003 HC RX 250 WO HCPCS: Performed by: INTERNAL MEDICINE

## 2023-04-11 PROCEDURE — 82550 ASSAY OF CK (CPK): CPT

## 2023-04-11 PROCEDURE — 85025 COMPLETE CBC W/AUTO DIFF WBC: CPT

## 2023-04-11 PROCEDURE — 80053 COMPREHEN METABOLIC PANEL: CPT

## 2023-04-11 PROCEDURE — 1100000000 HC RM PRIVATE

## 2023-04-11 PROCEDURE — 2580000003 HC RX 258: Performed by: FAMILY MEDICINE

## 2023-04-11 RX ORDER — SODIUM CHLORIDE 9 MG/ML
INJECTION, SOLUTION INTRAVENOUS CONTINUOUS
Status: DISCONTINUED | OUTPATIENT
Start: 2023-04-11 | End: 2023-04-17 | Stop reason: HOSPADM

## 2023-04-11 RX ADMIN — ACETAMINOPHEN 1000 MG: 500 TABLET ORAL at 09:30

## 2023-04-11 RX ADMIN — HYDRALAZINE HYDROCHLORIDE 25 MG: 25 TABLET, FILM COATED ORAL at 06:30

## 2023-04-11 RX ADMIN — CEFTRIAXONE 1000 MG: 1 INJECTION, POWDER, FOR SOLUTION INTRAMUSCULAR; INTRAVENOUS at 21:44

## 2023-04-11 RX ADMIN — SODIUM CHLORIDE: 9 INJECTION, SOLUTION INTRAVENOUS at 18:46

## 2023-04-11 RX ADMIN — ACETAMINOPHEN 1000 MG: 500 TABLET ORAL at 21:44

## 2023-04-11 RX ADMIN — SODIUM BICARBONATE: 84 INJECTION, SOLUTION INTRAVENOUS at 12:49

## 2023-04-11 RX ADMIN — HYDRALAZINE HYDROCHLORIDE 25 MG: 25 TABLET, FILM COATED ORAL at 13:51

## 2023-04-11 RX ADMIN — SODIUM BICARBONATE: 84 INJECTION, SOLUTION INTRAVENOUS at 03:08

## 2023-04-11 RX ADMIN — NIFEDIPINE 30 MG: 30 TABLET, EXTENDED RELEASE ORAL at 09:29

## 2023-04-11 RX ADMIN — INSULIN GLARGINE 7 UNITS: 100 INJECTION, SOLUTION SUBCUTANEOUS at 09:29

## 2023-04-11 RX ADMIN — GUANFACINE 2 MG: 1 TABLET ORAL at 09:29

## 2023-04-11 RX ADMIN — DOXEPIN HYDROCHLORIDE 100 MG: 50 CAPSULE ORAL at 21:44

## 2023-04-11 RX ADMIN — ALPRAZOLAM 1 MG: 0.5 TABLET ORAL at 12:49

## 2023-04-11 RX ADMIN — SODIUM CHLORIDE, PRESERVATIVE FREE 10 ML: 5 INJECTION INTRAVENOUS at 21:51

## 2023-04-11 RX ADMIN — INSULIN LISPRO 1 UNITS: 100 INJECTION, SOLUTION INTRAVENOUS; SUBCUTANEOUS at 09:29

## 2023-04-11 RX ADMIN — PANTOPRAZOLE SODIUM 40 MG: 40 TABLET, DELAYED RELEASE ORAL at 06:30

## 2023-04-11 ASSESSMENT — PAIN DESCRIPTION - DESCRIPTORS: DESCRIPTORS: ACHING

## 2023-04-11 ASSESSMENT — PAIN - FUNCTIONAL ASSESSMENT: PAIN_FUNCTIONAL_ASSESSMENT: ACTIVITIES ARE NOT PREVENTED

## 2023-04-11 ASSESSMENT — PAIN SCALES - GENERAL
PAINLEVEL_OUTOF10: 3
PAINLEVEL_OUTOF10: 0
PAINLEVEL_OUTOF10: 0

## 2023-04-12 ENCOUNTER — APPOINTMENT (OUTPATIENT)
Facility: HOSPITAL | Age: 78
DRG: 682 | End: 2023-04-12
Payer: MEDICARE

## 2023-04-12 LAB
ALBUMIN SERPL-MCNC: 2.5 G/DL (ref 3.4–5)
ANION GAP SERPL CALC-SCNC: 5 MMOL/L (ref 3–18)
BUN SERPL-MCNC: 74 MG/DL (ref 7–18)
BUN/CREAT SERPL: 19 (ref 12–20)
CALCIUM SERPL-MCNC: 8.2 MG/DL (ref 8.5–10.1)
CHLORIDE SERPL-SCNC: 102 MMOL/L (ref 100–111)
CO2 SERPL-SCNC: 28 MMOL/L (ref 21–32)
CREAT SERPL-MCNC: 3.83 MG/DL (ref 0.6–1.3)
GLUCOSE BLD STRIP.AUTO-MCNC: 143 MG/DL (ref 70–110)
GLUCOSE BLD STRIP.AUTO-MCNC: 157 MG/DL (ref 70–110)
GLUCOSE BLD STRIP.AUTO-MCNC: 165 MG/DL (ref 70–110)
GLUCOSE BLD STRIP.AUTO-MCNC: 225 MG/DL (ref 70–110)
GLUCOSE SERPL-MCNC: 122 MG/DL (ref 74–99)
IGA SERPL-MCNC: 234 MG/DL (ref 64–422)
IGG SERPL-MCNC: 918 MG/DL (ref 586–1602)
IGM SERPL-MCNC: 87 MG/DL (ref 26–217)
PHOSPHATE SERPL-MCNC: 5.2 MG/DL (ref 2.5–4.9)
POTASSIUM SERPL-SCNC: 3.7 MMOL/L (ref 3.5–5.5)
PROT PATTERN SERPL IFE-IMP: NORMAL
SODIUM SERPL-SCNC: 135 MMOL/L (ref 136–145)

## 2023-04-12 PROCEDURE — 1100000000 HC RM PRIVATE

## 2023-04-12 PROCEDURE — 6360000002 HC RX W HCPCS: Performed by: FAMILY MEDICINE

## 2023-04-12 PROCEDURE — 6360000002 HC RX W HCPCS: Performed by: HOSPITALIST

## 2023-04-12 PROCEDURE — 82962 GLUCOSE BLOOD TEST: CPT

## 2023-04-12 PROCEDURE — 2580000003 HC RX 258: Performed by: INTERNAL MEDICINE

## 2023-04-12 PROCEDURE — 2580000003 HC RX 258: Performed by: FAMILY MEDICINE

## 2023-04-12 PROCEDURE — 51701 INSERT BLADDER CATHETER: CPT

## 2023-04-12 PROCEDURE — 80069 RENAL FUNCTION PANEL: CPT

## 2023-04-12 PROCEDURE — 70450 CT HEAD/BRAIN W/O DYE: CPT

## 2023-04-12 PROCEDURE — 6370000000 HC RX 637 (ALT 250 FOR IP): Performed by: FAMILY MEDICINE

## 2023-04-12 PROCEDURE — 97530 THERAPEUTIC ACTIVITIES: CPT

## 2023-04-12 PROCEDURE — 6370000000 HC RX 637 (ALT 250 FOR IP): Performed by: HOSPITALIST

## 2023-04-12 PROCEDURE — 74176 CT ABD & PELVIS W/O CONTRAST: CPT

## 2023-04-12 PROCEDURE — 36415 COLL VENOUS BLD VENIPUNCTURE: CPT

## 2023-04-12 PROCEDURE — 6360000002 HC RX W HCPCS: Performed by: INTERNAL MEDICINE

## 2023-04-12 RX ORDER — QUETIAPINE FUMARATE 25 MG/1
50 TABLET, FILM COATED ORAL NIGHTLY
Status: DISCONTINUED | OUTPATIENT
Start: 2023-04-12 | End: 2023-04-13

## 2023-04-12 RX ORDER — INSULIN GLARGINE 100 [IU]/ML
12 INJECTION, SOLUTION SUBCUTANEOUS DAILY
Status: DISCONTINUED | OUTPATIENT
Start: 2023-04-13 | End: 2023-04-17 | Stop reason: HOSPADM

## 2023-04-12 RX ORDER — HALOPERIDOL 5 MG/ML
5 INJECTION INTRAMUSCULAR ONCE
Status: COMPLETED | OUTPATIENT
Start: 2023-04-12 | End: 2023-04-12

## 2023-04-12 RX ORDER — NICOTINE 21 MG/24HR
1 PATCH, TRANSDERMAL 24 HOURS TRANSDERMAL DAILY
Status: DISCONTINUED | OUTPATIENT
Start: 2023-04-13 | End: 2023-04-13 | Stop reason: RX

## 2023-04-12 RX ORDER — HALOPERIDOL 5 MG/ML
5 INJECTION INTRAMUSCULAR ONCE
Status: COMPLETED | OUTPATIENT
Start: 2023-04-12 | End: 2023-04-13

## 2023-04-12 RX ORDER — HALOPERIDOL 5 MG/ML
2 INJECTION INTRAMUSCULAR ONCE
Status: DISCONTINUED | OUTPATIENT
Start: 2023-04-12 | End: 2023-04-12

## 2023-04-12 RX ORDER — LORAZEPAM 2 MG/ML
0.5 INJECTION INTRAMUSCULAR EVERY 4 HOURS PRN
Status: DISCONTINUED | OUTPATIENT
Start: 2023-04-12 | End: 2023-04-17 | Stop reason: HOSPADM

## 2023-04-12 RX ADMIN — GUANFACINE 2 MG: 1 TABLET ORAL at 09:01

## 2023-04-12 RX ADMIN — CEFTRIAXONE 1000 MG: 1 INJECTION, POWDER, FOR SOLUTION INTRAMUSCULAR; INTRAVENOUS at 22:58

## 2023-04-12 RX ADMIN — LORAZEPAM 0.5 MG: 2 INJECTION INTRAMUSCULAR; INTRAVENOUS at 18:00

## 2023-04-12 RX ADMIN — INSULIN LISPRO 1 UNITS: 100 INJECTION, SOLUTION INTRAVENOUS; SUBCUTANEOUS at 09:03

## 2023-04-12 RX ADMIN — ALPRAZOLAM 1 MG: 0.5 TABLET ORAL at 13:31

## 2023-04-12 RX ADMIN — SODIUM CHLORIDE: 9 INJECTION, SOLUTION INTRAVENOUS at 09:01

## 2023-04-12 RX ADMIN — NIFEDIPINE 30 MG: 30 TABLET, EXTENDED RELEASE ORAL at 09:01

## 2023-04-12 RX ADMIN — HALOPERIDOL LACTATE 5 MG: 5 INJECTION, SOLUTION INTRAMUSCULAR at 23:35

## 2023-04-12 RX ADMIN — PANTOPRAZOLE SODIUM 40 MG: 40 TABLET, DELAYED RELEASE ORAL at 06:34

## 2023-04-12 RX ADMIN — INSULIN GLARGINE 7 UNITS: 100 INJECTION, SOLUTION SUBCUTANEOUS at 09:02

## 2023-04-12 RX ADMIN — ACETAMINOPHEN 650 MG: 325 TABLET ORAL at 13:31

## 2023-04-12 RX ADMIN — LORAZEPAM 0.5 MG: 2 INJECTION INTRAMUSCULAR; INTRAVENOUS at 22:31

## 2023-04-12 ASSESSMENT — PAIN SCALES - GENERAL
PAINLEVEL_OUTOF10: 0
PAINLEVEL_OUTOF10: 3

## 2023-04-12 ASSESSMENT — PAIN - FUNCTIONAL ASSESSMENT: PAIN_FUNCTIONAL_ASSESSMENT: ACTIVITIES ARE NOT PREVENTED

## 2023-04-12 ASSESSMENT — PAIN DESCRIPTION - DESCRIPTORS: DESCRIPTORS: ACHING

## 2023-04-12 ASSESSMENT — PAIN DESCRIPTION - LOCATION: LOCATION: NECK;BACK

## 2023-04-13 LAB
ALBUMIN SERPL-MCNC: 2.5 G/DL (ref 3.4–5)
ALBUMIN/GLOB SERPL: 0.9 (ref 0.8–1.7)
ALP SERPL-CCNC: 72 U/L (ref 45–117)
ALT SERPL-CCNC: 74 U/L (ref 13–56)
ANION GAP SERPL CALC-SCNC: 8 MMOL/L (ref 3–18)
AST SERPL-CCNC: 98 U/L (ref 10–38)
BASOPHILS # BLD: 0 K/UL (ref 0–0.1)
BASOPHILS NFR BLD: 1 % (ref 0–2)
BILIRUB SERPL-MCNC: 0.3 MG/DL (ref 0.2–1)
BUN SERPL-MCNC: 64 MG/DL (ref 7–18)
BUN/CREAT SERPL: 19 (ref 12–20)
CALCIUM SERPL-MCNC: 8.2 MG/DL (ref 8.5–10.1)
CHLORIDE SERPL-SCNC: 107 MMOL/L (ref 100–111)
CK SERPL-CCNC: 1147 U/L (ref 26–192)
CO2 SERPL-SCNC: 27 MMOL/L (ref 21–32)
CREAT SERPL-MCNC: 3.33 MG/DL (ref 0.6–1.3)
DIFFERENTIAL METHOD BLD: ABNORMAL
EOSINOPHIL # BLD: 0.1 K/UL (ref 0–0.4)
EOSINOPHIL NFR BLD: 2 % (ref 0–5)
ERYTHROCYTE [DISTWIDTH] IN BLOOD BY AUTOMATED COUNT: 13 % (ref 11.6–14.5)
FOLATE SERPL-MCNC: >20 NG/ML (ref 3.1–17.5)
GLOBULIN SER CALC-MCNC: 2.9 G/DL (ref 2–4)
GLUCOSE BLD STRIP.AUTO-MCNC: 149 MG/DL (ref 70–110)
GLUCOSE BLD STRIP.AUTO-MCNC: 169 MG/DL (ref 70–110)
GLUCOSE BLD STRIP.AUTO-MCNC: 177 MG/DL (ref 70–110)
GLUCOSE BLD STRIP.AUTO-MCNC: 249 MG/DL (ref 70–110)
GLUCOSE SERPL-MCNC: 132 MG/DL (ref 74–99)
HCT VFR BLD AUTO: 25.5 % (ref 35–45)
HGB BLD-MCNC: 8.6 G/DL (ref 12–16)
IMM GRANULOCYTES # BLD AUTO: 0 K/UL (ref 0–0.04)
IMM GRANULOCYTES NFR BLD AUTO: 0 % (ref 0–0.5)
IRON SERPL-MCNC: 37 UG/DL (ref 50–175)
LYMPHOCYTES # BLD: 1.5 K/UL (ref 0.9–3.6)
LYMPHOCYTES NFR BLD: 26 % (ref 21–52)
MCH RBC QN AUTO: 32.8 PG (ref 24–34)
MCHC RBC AUTO-ENTMCNC: 33.7 G/DL (ref 31–37)
MCV RBC AUTO: 97.3 FL (ref 78–100)
MONOCYTES # BLD: 0.3 K/UL (ref 0.05–1.2)
MONOCYTES NFR BLD: 6 % (ref 3–10)
NEUTS SEG # BLD: 3.8 K/UL (ref 1.8–8)
NEUTS SEG NFR BLD: 65 % (ref 40–73)
NRBC # BLD: 0 K/UL (ref 0–0.01)
NRBC BLD-RTO: 0 PER 100 WBC
PLATELET # BLD AUTO: 179 K/UL (ref 135–420)
PMV BLD AUTO: 10.2 FL (ref 9.2–11.8)
POTASSIUM SERPL-SCNC: 3.4 MMOL/L (ref 3.5–5.5)
PROT SERPL-MCNC: 5.4 G/DL (ref 6.4–8.2)
RBC # BLD AUTO: 2.62 M/UL (ref 4.2–5.3)
SODIUM SERPL-SCNC: 142 MMOL/L (ref 136–145)
VIT B12 SERPL-MCNC: 911 PG/ML (ref 211–911)
WBC # BLD AUTO: 5.8 K/UL (ref 4.6–13.2)

## 2023-04-13 PROCEDURE — 6360000002 HC RX W HCPCS: Performed by: INTERNAL MEDICINE

## 2023-04-13 PROCEDURE — 82962 GLUCOSE BLOOD TEST: CPT

## 2023-04-13 PROCEDURE — 2580000003 HC RX 258: Performed by: FAMILY MEDICINE

## 2023-04-13 PROCEDURE — 51702 INSERT TEMP BLADDER CATH: CPT

## 2023-04-13 PROCEDURE — 82550 ASSAY OF CK (CPK): CPT

## 2023-04-13 PROCEDURE — 6370000000 HC RX 637 (ALT 250 FOR IP): Performed by: FAMILY MEDICINE

## 2023-04-13 PROCEDURE — 82607 VITAMIN B-12: CPT

## 2023-04-13 PROCEDURE — 2580000003 HC RX 258: Performed by: INTERNAL MEDICINE

## 2023-04-13 PROCEDURE — 80053 COMPREHEN METABOLIC PANEL: CPT

## 2023-04-13 PROCEDURE — 1100000000 HC RM PRIVATE

## 2023-04-13 PROCEDURE — 6370000000 HC RX 637 (ALT 250 FOR IP): Performed by: HOSPITALIST

## 2023-04-13 PROCEDURE — 83540 ASSAY OF IRON: CPT

## 2023-04-13 PROCEDURE — 6370000000 HC RX 637 (ALT 250 FOR IP): Performed by: INTERNAL MEDICINE

## 2023-04-13 PROCEDURE — 85025 COMPLETE CBC W/AUTO DIFF WBC: CPT

## 2023-04-13 PROCEDURE — 36415 COLL VENOUS BLD VENIPUNCTURE: CPT

## 2023-04-13 RX ORDER — HALOPERIDOL 5 MG/ML
5 INJECTION INTRAMUSCULAR ONCE
Status: COMPLETED | OUTPATIENT
Start: 2023-04-13 | End: 2023-04-13

## 2023-04-13 RX ORDER — DIPHENHYDRAMINE HYDROCHLORIDE 50 MG/ML
25 INJECTION INTRAMUSCULAR; INTRAVENOUS EVERY 6 HOURS PRN
Status: CANCELLED | OUTPATIENT
Start: 2023-04-13

## 2023-04-13 RX ORDER — INSULIN LISPRO 100 [IU]/ML
0-8 INJECTION, SOLUTION INTRAVENOUS; SUBCUTANEOUS
Status: DISCONTINUED | OUTPATIENT
Start: 2023-04-13 | End: 2023-04-17 | Stop reason: HOSPADM

## 2023-04-13 RX ORDER — NICOTINE 21 MG/24HR
1 PATCH, TRANSDERMAL 24 HOURS TRANSDERMAL DAILY
Status: DISCONTINUED | OUTPATIENT
Start: 2023-04-13 | End: 2023-04-17 | Stop reason: HOSPADM

## 2023-04-13 RX ORDER — POTASSIUM CHLORIDE 20 MEQ/1
20 TABLET, EXTENDED RELEASE ORAL ONCE
Status: COMPLETED | OUTPATIENT
Start: 2023-04-13 | End: 2023-04-13

## 2023-04-13 RX ORDER — HALOPERIDOL 5 MG/ML
5 INJECTION INTRAMUSCULAR ONCE
Status: DISCONTINUED | OUTPATIENT
Start: 2023-04-13 | End: 2023-04-13

## 2023-04-13 RX ORDER — ALPRAZOLAM 0.5 MG/1
1 TABLET ORAL 3 TIMES DAILY PRN
Status: DISCONTINUED | OUTPATIENT
Start: 2023-04-13 | End: 2023-04-17 | Stop reason: HOSPADM

## 2023-04-13 RX ORDER — INSULIN LISPRO 100 [IU]/ML
0-4 INJECTION, SOLUTION INTRAVENOUS; SUBCUTANEOUS NIGHTLY
Status: DISCONTINUED | OUTPATIENT
Start: 2023-04-13 | End: 2023-04-17 | Stop reason: HOSPADM

## 2023-04-13 RX ORDER — DIAZEPAM 5 MG/ML
5 INJECTION, SOLUTION INTRAMUSCULAR; INTRAVENOUS ONCE
Status: COMPLETED | OUTPATIENT
Start: 2023-04-13 | End: 2023-04-13

## 2023-04-13 RX ORDER — ALPRAZOLAM 0.5 MG/1
0.5 TABLET ORAL 3 TIMES DAILY PRN
Status: DISCONTINUED | OUTPATIENT
Start: 2023-04-13 | End: 2023-04-13

## 2023-04-13 RX ADMIN — GUANFACINE 2 MG: 1 TABLET ORAL at 09:28

## 2023-04-13 RX ADMIN — INSULIN LISPRO 2 UNITS: 100 INJECTION, SOLUTION INTRAVENOUS; SUBCUTANEOUS at 13:44

## 2023-04-13 RX ADMIN — DIAZEPAM 5 MG: 5 INJECTION, SOLUTION INTRAMUSCULAR; INTRAVENOUS at 01:00

## 2023-04-13 RX ADMIN — HYDRALAZINE HYDROCHLORIDE 25 MG: 25 TABLET, FILM COATED ORAL at 22:42

## 2023-04-13 RX ADMIN — SODIUM CHLORIDE, PRESERVATIVE FREE 10 ML: 5 INJECTION INTRAVENOUS at 02:02

## 2023-04-13 RX ADMIN — INSULIN GLARGINE 12 UNITS: 100 INJECTION, SOLUTION SUBCUTANEOUS at 09:26

## 2023-04-13 RX ADMIN — SODIUM CHLORIDE, PRESERVATIVE FREE 10 ML: 5 INJECTION INTRAVENOUS at 20:34

## 2023-04-13 RX ADMIN — HYDRALAZINE HYDROCHLORIDE 25 MG: 25 TABLET, FILM COATED ORAL at 13:44

## 2023-04-13 RX ADMIN — NIFEDIPINE 30 MG: 30 TABLET, EXTENDED RELEASE ORAL at 09:28

## 2023-04-13 RX ADMIN — ATENOLOL 50 MG: 50 TABLET ORAL at 22:42

## 2023-04-13 RX ADMIN — ACETAMINOPHEN 1000 MG: 500 TABLET ORAL at 20:33

## 2023-04-13 RX ADMIN — DOXEPIN HYDROCHLORIDE 100 MG: 50 CAPSULE ORAL at 20:33

## 2023-04-13 RX ADMIN — HALOPERIDOL LACTATE 5 MG: 5 INJECTION, SOLUTION INTRAMUSCULAR at 01:35

## 2023-04-13 RX ADMIN — POTASSIUM CHLORIDE 20 MEQ: 1500 TABLET, EXTENDED RELEASE ORAL at 09:28

## 2023-04-13 RX ADMIN — ALPRAZOLAM 0.5 MG: 0.5 TABLET ORAL at 12:01

## 2023-04-13 RX ADMIN — HALOPERIDOL LACTATE 5 MG: 5 INJECTION, SOLUTION INTRAMUSCULAR at 06:25

## 2023-04-13 RX ADMIN — ALPRAZOLAM 1 MG: 0.5 TABLET ORAL at 22:42

## 2023-04-13 RX ADMIN — SODIUM CHLORIDE: 9 INJECTION, SOLUTION INTRAVENOUS at 22:42

## 2023-04-13 ASSESSMENT — PAIN DESCRIPTION - DESCRIPTORS: DESCRIPTORS: HEAVINESS

## 2023-04-13 ASSESSMENT — PAIN SCALES - GENERAL: PAINLEVEL_OUTOF10: 6

## 2023-04-13 ASSESSMENT — PAIN DESCRIPTION - LOCATION: LOCATION: HEAD

## 2023-04-14 LAB
ALBUMIN SERPL-MCNC: 2.3 G/DL (ref 3.4–5)
ALBUMIN/GLOB SERPL: 0.7 (ref 0.8–1.7)
ALP SERPL-CCNC: 63 U/L (ref 45–117)
ALT SERPL-CCNC: 66 U/L (ref 13–56)
ANION GAP SERPL CALC-SCNC: 6 MMOL/L (ref 3–18)
AST SERPL-CCNC: 70 U/L (ref 10–38)
BACTERIA SPEC CULT: NORMAL
BACTERIA SPEC CULT: NORMAL
BASOPHILS # BLD: 0 K/UL (ref 0–0.1)
BASOPHILS NFR BLD: 1 % (ref 0–2)
BILIRUB SERPL-MCNC: 0.2 MG/DL (ref 0.2–1)
BUN SERPL-MCNC: 52 MG/DL (ref 7–18)
BUN/CREAT SERPL: 20 (ref 12–20)
CALCIUM SERPL-MCNC: 8 MG/DL (ref 8.5–10.1)
CHLORIDE SERPL-SCNC: 109 MMOL/L (ref 100–111)
CK SERPL-CCNC: 559 U/L (ref 26–192)
CO2 SERPL-SCNC: 25 MMOL/L (ref 21–32)
CREAT SERPL-MCNC: 2.58 MG/DL (ref 0.6–1.3)
DIFFERENTIAL METHOD BLD: ABNORMAL
EOSINOPHIL # BLD: 0.1 K/UL (ref 0–0.4)
EOSINOPHIL NFR BLD: 2 % (ref 0–5)
ERYTHROCYTE [DISTWIDTH] IN BLOOD BY AUTOMATED COUNT: 13 % (ref 11.6–14.5)
GLOBULIN SER CALC-MCNC: 3.4 G/DL (ref 2–4)
GLUCOSE BLD STRIP.AUTO-MCNC: 156 MG/DL (ref 70–110)
GLUCOSE BLD STRIP.AUTO-MCNC: 162 MG/DL (ref 70–110)
GLUCOSE BLD STRIP.AUTO-MCNC: 181 MG/DL (ref 70–110)
GLUCOSE BLD STRIP.AUTO-MCNC: 257 MG/DL (ref 70–110)
GLUCOSE SERPL-MCNC: 104 MG/DL (ref 74–99)
HCT VFR BLD AUTO: 27 % (ref 35–45)
HGB BLD-MCNC: 8.9 G/DL (ref 12–16)
IMM GRANULOCYTES # BLD AUTO: 0 K/UL (ref 0–0.04)
IMM GRANULOCYTES NFR BLD AUTO: 0 % (ref 0–0.5)
LYMPHOCYTES # BLD: 2 K/UL (ref 0.9–3.6)
LYMPHOCYTES NFR BLD: 33 % (ref 21–52)
MCH RBC QN AUTO: 33.1 PG (ref 24–34)
MCHC RBC AUTO-ENTMCNC: 33 G/DL (ref 31–37)
MCV RBC AUTO: 100.4 FL (ref 78–100)
MONOCYTES # BLD: 0.4 K/UL (ref 0.05–1.2)
MONOCYTES NFR BLD: 6 % (ref 3–10)
NEUTS SEG # BLD: 3.5 K/UL (ref 1.8–8)
NEUTS SEG NFR BLD: 58 % (ref 40–73)
NRBC # BLD: 0 K/UL (ref 0–0.01)
NRBC BLD-RTO: 0 PER 100 WBC
PLATELET # BLD AUTO: 169 K/UL (ref 135–420)
PMV BLD AUTO: 10.1 FL (ref 9.2–11.8)
POTASSIUM SERPL-SCNC: 3.2 MMOL/L (ref 3.5–5.5)
PROT SERPL-MCNC: 5.7 G/DL (ref 6.4–8.2)
RBC # BLD AUTO: 2.69 M/UL (ref 4.2–5.3)
SERVICE CMNT-IMP: NORMAL
SERVICE CMNT-IMP: NORMAL
SODIUM SERPL-SCNC: 140 MMOL/L (ref 136–145)
WBC # BLD AUTO: 6 K/UL (ref 4.6–13.2)

## 2023-04-14 PROCEDURE — 85025 COMPLETE CBC W/AUTO DIFF WBC: CPT

## 2023-04-14 PROCEDURE — 2580000003 HC RX 258: Performed by: HOSPITALIST

## 2023-04-14 PROCEDURE — 6370000000 HC RX 637 (ALT 250 FOR IP): Performed by: FAMILY MEDICINE

## 2023-04-14 PROCEDURE — 6360000002 HC RX W HCPCS: Performed by: HOSPITALIST

## 2023-04-14 PROCEDURE — 2580000003 HC RX 258: Performed by: INTERNAL MEDICINE

## 2023-04-14 PROCEDURE — 2580000003 HC RX 258: Performed by: FAMILY MEDICINE

## 2023-04-14 PROCEDURE — 82550 ASSAY OF CK (CPK): CPT

## 2023-04-14 PROCEDURE — 6370000000 HC RX 637 (ALT 250 FOR IP): Performed by: HOSPITALIST

## 2023-04-14 PROCEDURE — 82962 GLUCOSE BLOOD TEST: CPT

## 2023-04-14 PROCEDURE — 97530 THERAPEUTIC ACTIVITIES: CPT

## 2023-04-14 PROCEDURE — 51702 INSERT TEMP BLADDER CATH: CPT

## 2023-04-14 PROCEDURE — 36415 COLL VENOUS BLD VENIPUNCTURE: CPT

## 2023-04-14 PROCEDURE — 80053 COMPREHEN METABOLIC PANEL: CPT

## 2023-04-14 PROCEDURE — 6370000000 HC RX 637 (ALT 250 FOR IP): Performed by: INTERNAL MEDICINE

## 2023-04-14 PROCEDURE — 1100000000 HC RM PRIVATE

## 2023-04-14 RX ORDER — POTASSIUM CHLORIDE 20 MEQ/1
40 TABLET, EXTENDED RELEASE ORAL
Status: DISCONTINUED | OUTPATIENT
Start: 2023-04-15 | End: 2023-04-17 | Stop reason: HOSPADM

## 2023-04-14 RX ORDER — HYDRALAZINE HYDROCHLORIDE 50 MG/1
50 TABLET, FILM COATED ORAL EVERY 8 HOURS SCHEDULED
Status: DISCONTINUED | OUTPATIENT
Start: 2023-04-14 | End: 2023-04-17 | Stop reason: HOSPADM

## 2023-04-14 RX ORDER — POTASSIUM CHLORIDE 20 MEQ/1
40 TABLET, EXTENDED RELEASE ORAL ONCE
Status: COMPLETED | OUTPATIENT
Start: 2023-04-14 | End: 2023-04-14

## 2023-04-14 RX ORDER — POLYETHYLENE GLYCOL 3350 17 G/17G
17 POWDER, FOR SOLUTION ORAL DAILY
Status: DISCONTINUED | OUTPATIENT
Start: 2023-04-14 | End: 2023-04-17 | Stop reason: HOSPADM

## 2023-04-14 RX ADMIN — SODIUM CHLORIDE, PRESERVATIVE FREE 10 ML: 5 INJECTION INTRAVENOUS at 23:08

## 2023-04-14 RX ADMIN — ATENOLOL 50 MG: 50 TABLET ORAL at 21:45

## 2023-04-14 RX ADMIN — SODIUM CHLORIDE, PRESERVATIVE FREE 10 ML: 5 INJECTION INTRAVENOUS at 13:35

## 2023-04-14 RX ADMIN — HYDRALAZINE HYDROCHLORIDE 50 MG: 50 TABLET, FILM COATED ORAL at 21:45

## 2023-04-14 RX ADMIN — PANTOPRAZOLE SODIUM 40 MG: 40 TABLET, DELAYED RELEASE ORAL at 06:07

## 2023-04-14 RX ADMIN — HYDRALAZINE HYDROCHLORIDE 25 MG: 25 TABLET, FILM COATED ORAL at 06:07

## 2023-04-14 RX ADMIN — ALPRAZOLAM 1 MG: 0.5 TABLET ORAL at 23:08

## 2023-04-14 RX ADMIN — HYDRALAZINE HYDROCHLORIDE 50 MG: 50 TABLET, FILM COATED ORAL at 16:11

## 2023-04-14 RX ADMIN — GUANFACINE 2 MG: 1 TABLET ORAL at 09:23

## 2023-04-14 RX ADMIN — ACETAMINOPHEN 1000 MG: 500 TABLET ORAL at 11:07

## 2023-04-14 RX ADMIN — DOXEPIN HYDROCHLORIDE 100 MG: 50 CAPSULE ORAL at 21:45

## 2023-04-14 RX ADMIN — SODIUM CHLORIDE: 9 INJECTION, SOLUTION INTRAVENOUS at 13:28

## 2023-04-14 RX ADMIN — INSULIN GLARGINE 12 UNITS: 100 INJECTION, SOLUTION SUBCUTANEOUS at 09:31

## 2023-04-14 RX ADMIN — ALPRAZOLAM 1 MG: 0.5 TABLET ORAL at 11:06

## 2023-04-14 RX ADMIN — POTASSIUM CHLORIDE 40 MEQ: 1500 TABLET, EXTENDED RELEASE ORAL at 09:25

## 2023-04-14 RX ADMIN — NIFEDIPINE 30 MG: 30 TABLET, EXTENDED RELEASE ORAL at 09:24

## 2023-04-14 RX ADMIN — IRON SUCROSE 300 MG: 20 INJECTION, SOLUTION INTRAVENOUS at 10:54

## 2023-04-14 ASSESSMENT — PAIN DESCRIPTION - DESCRIPTORS: DESCRIPTORS: ACHING

## 2023-04-14 ASSESSMENT — PAIN DESCRIPTION - LOCATION
LOCATION: HEAD
LOCATION: HEAD

## 2023-04-14 ASSESSMENT — PAIN SCALES - GENERAL
PAINLEVEL_OUTOF10: 4
PAINLEVEL_OUTOF10: 4
PAINLEVEL_OUTOF10: 0

## 2023-04-14 ASSESSMENT — PAIN - FUNCTIONAL ASSESSMENT: PAIN_FUNCTIONAL_ASSESSMENT: ACTIVITIES ARE NOT PREVENTED

## 2023-04-15 LAB
ALBUMIN SERPL-MCNC: 2.5 G/DL (ref 3.4–5)
ALBUMIN/GLOB SERPL: 0.8 (ref 0.8–1.7)
ALP SERPL-CCNC: 77 U/L (ref 45–117)
ALT SERPL-CCNC: 67 U/L (ref 13–56)
ANION GAP SERPL CALC-SCNC: 7 MMOL/L (ref 3–18)
AST SERPL-CCNC: 60 U/L (ref 10–38)
BASOPHILS # BLD: 0.1 K/UL (ref 0–0.1)
BASOPHILS NFR BLD: 1 % (ref 0–2)
BILIRUB SERPL-MCNC: 0.3 MG/DL (ref 0.2–1)
BUN SERPL-MCNC: 48 MG/DL (ref 7–18)
BUN/CREAT SERPL: 18 (ref 12–20)
CALCIUM SERPL-MCNC: 8.7 MG/DL (ref 8.5–10.1)
CHLORIDE SERPL-SCNC: 109 MMOL/L (ref 100–111)
CK SERPL-CCNC: 339 U/L (ref 26–192)
CO2 SERPL-SCNC: 24 MMOL/L (ref 21–32)
CREAT SERPL-MCNC: 2.73 MG/DL (ref 0.6–1.3)
DIFFERENTIAL METHOD BLD: ABNORMAL
EOSINOPHIL # BLD: 0.1 K/UL (ref 0–0.4)
EOSINOPHIL NFR BLD: 2 % (ref 0–5)
ERYTHROCYTE [DISTWIDTH] IN BLOOD BY AUTOMATED COUNT: 13.1 % (ref 11.6–14.5)
GLOBULIN SER CALC-MCNC: 3.3 G/DL (ref 2–4)
GLUCOSE BLD STRIP.AUTO-MCNC: 127 MG/DL (ref 70–110)
GLUCOSE BLD STRIP.AUTO-MCNC: 147 MG/DL (ref 70–110)
GLUCOSE BLD STRIP.AUTO-MCNC: 172 MG/DL (ref 70–110)
GLUCOSE BLD STRIP.AUTO-MCNC: 206 MG/DL (ref 70–110)
GLUCOSE SERPL-MCNC: 93 MG/DL (ref 74–99)
HCT VFR BLD AUTO: 30 % (ref 35–45)
HGB BLD-MCNC: 9.9 G/DL (ref 12–16)
IMM GRANULOCYTES # BLD AUTO: 0 K/UL (ref 0–0.04)
IMM GRANULOCYTES NFR BLD AUTO: 1 % (ref 0–0.5)
LYMPHOCYTES # BLD: 1.5 K/UL (ref 0.9–3.6)
LYMPHOCYTES NFR BLD: 19 % (ref 21–52)
MCH RBC QN AUTO: 33 PG (ref 24–34)
MCHC RBC AUTO-ENTMCNC: 33 G/DL (ref 31–37)
MCV RBC AUTO: 100 FL (ref 78–100)
MONOCYTES # BLD: 0.4 K/UL (ref 0.05–1.2)
MONOCYTES NFR BLD: 6 % (ref 3–10)
NEUTS SEG # BLD: 5.8 K/UL (ref 1.8–8)
NEUTS SEG NFR BLD: 73 % (ref 40–73)
NRBC # BLD: 0 K/UL (ref 0–0.01)
NRBC BLD-RTO: 0 PER 100 WBC
PLATELET # BLD AUTO: 219 K/UL (ref 135–420)
PMV BLD AUTO: 10.9 FL (ref 9.2–11.8)
POTASSIUM SERPL-SCNC: 4.2 MMOL/L (ref 3.5–5.5)
PROT SERPL-MCNC: 5.8 G/DL (ref 6.4–8.2)
RBC # BLD AUTO: 3 M/UL (ref 4.2–5.3)
SODIUM SERPL-SCNC: 140 MMOL/L (ref 136–145)
WBC # BLD AUTO: 8 K/UL (ref 4.6–13.2)

## 2023-04-15 PROCEDURE — 51702 INSERT TEMP BLADDER CATH: CPT

## 2023-04-15 PROCEDURE — 6370000000 HC RX 637 (ALT 250 FOR IP): Performed by: HOSPITALIST

## 2023-04-15 PROCEDURE — 36415 COLL VENOUS BLD VENIPUNCTURE: CPT

## 2023-04-15 PROCEDURE — 1100000000 HC RM PRIVATE

## 2023-04-15 PROCEDURE — 6370000000 HC RX 637 (ALT 250 FOR IP): Performed by: INTERNAL MEDICINE

## 2023-04-15 PROCEDURE — 80053 COMPREHEN METABOLIC PANEL: CPT

## 2023-04-15 PROCEDURE — 2580000003 HC RX 258: Performed by: FAMILY MEDICINE

## 2023-04-15 PROCEDURE — 97116 GAIT TRAINING THERAPY: CPT

## 2023-04-15 PROCEDURE — 85025 COMPLETE CBC W/AUTO DIFF WBC: CPT

## 2023-04-15 PROCEDURE — 2580000003 HC RX 258: Performed by: INTERNAL MEDICINE

## 2023-04-15 PROCEDURE — 82550 ASSAY OF CK (CPK): CPT

## 2023-04-15 PROCEDURE — 82962 GLUCOSE BLOOD TEST: CPT

## 2023-04-15 PROCEDURE — 6370000000 HC RX 637 (ALT 250 FOR IP): Performed by: FAMILY MEDICINE

## 2023-04-15 RX ADMIN — ACETAMINOPHEN 1000 MG: 500 TABLET ORAL at 17:17

## 2023-04-15 RX ADMIN — ACETAMINOPHEN 1000 MG: 500 TABLET ORAL at 09:37

## 2023-04-15 RX ADMIN — INSULIN LISPRO 2 UNITS: 100 INJECTION, SOLUTION INTRAVENOUS; SUBCUTANEOUS at 11:49

## 2023-04-15 RX ADMIN — ACETAMINOPHEN 1000 MG: 500 TABLET ORAL at 23:15

## 2023-04-15 RX ADMIN — SODIUM CHLORIDE, PRESERVATIVE FREE 10 ML: 5 INJECTION INTRAVENOUS at 08:57

## 2023-04-15 RX ADMIN — HYDRALAZINE HYDROCHLORIDE 50 MG: 50 TABLET, FILM COATED ORAL at 15:23

## 2023-04-15 RX ADMIN — SODIUM CHLORIDE, PRESERVATIVE FREE 10 ML: 5 INJECTION INTRAVENOUS at 21:40

## 2023-04-15 RX ADMIN — ALPRAZOLAM 1 MG: 0.5 TABLET ORAL at 11:49

## 2023-04-15 RX ADMIN — NIFEDIPINE 30 MG: 30 TABLET, EXTENDED RELEASE ORAL at 08:49

## 2023-04-15 RX ADMIN — HYDRALAZINE HYDROCHLORIDE 50 MG: 50 TABLET, FILM COATED ORAL at 21:39

## 2023-04-15 RX ADMIN — DOXEPIN HYDROCHLORIDE 100 MG: 50 CAPSULE ORAL at 21:39

## 2023-04-15 RX ADMIN — SODIUM CHLORIDE: 9 INJECTION, SOLUTION INTRAVENOUS at 17:17

## 2023-04-15 RX ADMIN — POTASSIUM CHLORIDE 40 MEQ: 1500 TABLET, EXTENDED RELEASE ORAL at 08:50

## 2023-04-15 RX ADMIN — HYDRALAZINE HYDROCHLORIDE 50 MG: 50 TABLET, FILM COATED ORAL at 06:16

## 2023-04-15 RX ADMIN — SODIUM CHLORIDE, PRESERVATIVE FREE 10 ML: 5 INJECTION INTRAVENOUS at 08:52

## 2023-04-15 RX ADMIN — INSULIN GLARGINE 12 UNITS: 100 INJECTION, SOLUTION SUBCUTANEOUS at 08:50

## 2023-04-15 RX ADMIN — ALPRAZOLAM 1 MG: 0.5 TABLET ORAL at 23:50

## 2023-04-15 RX ADMIN — PANTOPRAZOLE SODIUM 40 MG: 40 TABLET, DELAYED RELEASE ORAL at 06:16

## 2023-04-15 RX ADMIN — GUANFACINE 2 MG: 1 TABLET ORAL at 08:49

## 2023-04-15 RX ADMIN — ATENOLOL 50 MG: 50 TABLET ORAL at 21:39

## 2023-04-15 ASSESSMENT — ENCOUNTER SYMPTOMS
EYES NEGATIVE: 1
SHORTNESS OF BREATH: 0
ABDOMINAL PAIN: 0
BLOOD IN STOOL: 0
BACK PAIN: 0
CONSTIPATION: 0
COUGH: 0
CHEST TIGHTNESS: 0
WHEEZING: 0
SORE THROAT: 0
DIARRHEA: 0
SINUS PRESSURE: 0
ABDOMINAL DISTENTION: 0
NAUSEA: 0
VOMITING: 0

## 2023-04-15 ASSESSMENT — PAIN DESCRIPTION - LOCATION
LOCATION: HEAD;LEG
LOCATION: BACK;LEG
LOCATION: HEAD

## 2023-04-15 ASSESSMENT — PAIN - FUNCTIONAL ASSESSMENT: PAIN_FUNCTIONAL_ASSESSMENT: ACTIVITIES ARE NOT PREVENTED

## 2023-04-15 ASSESSMENT — PAIN SCALES - GENERAL
PAINLEVEL_OUTOF10: 6
PAINLEVEL_OUTOF10: 6
PAINLEVEL_OUTOF10: 5
PAINLEVEL_OUTOF10: 5

## 2023-04-15 ASSESSMENT — PAIN DESCRIPTION - DESCRIPTORS
DESCRIPTORS: DISCOMFORT
DESCRIPTORS: DISCOMFORT;SHARP

## 2023-04-15 ASSESSMENT — PAIN DESCRIPTION - ORIENTATION: ORIENTATION: LEFT

## 2023-04-16 LAB
GLUCOSE BLD STRIP.AUTO-MCNC: 101 MG/DL (ref 70–110)
GLUCOSE BLD STRIP.AUTO-MCNC: 138 MG/DL (ref 70–110)
GLUCOSE BLD STRIP.AUTO-MCNC: 277 MG/DL (ref 70–110)

## 2023-04-16 PROCEDURE — 6370000000 HC RX 637 (ALT 250 FOR IP): Performed by: FAMILY MEDICINE

## 2023-04-16 PROCEDURE — 2580000003 HC RX 258: Performed by: FAMILY MEDICINE

## 2023-04-16 PROCEDURE — 82962 GLUCOSE BLOOD TEST: CPT

## 2023-04-16 PROCEDURE — 51702 INSERT TEMP BLADDER CATH: CPT

## 2023-04-16 PROCEDURE — 6370000000 HC RX 637 (ALT 250 FOR IP): Performed by: HOSPITALIST

## 2023-04-16 PROCEDURE — 6370000000 HC RX 637 (ALT 250 FOR IP): Performed by: INTERNAL MEDICINE

## 2023-04-16 PROCEDURE — 2580000003 HC RX 258: Performed by: INTERNAL MEDICINE

## 2023-04-16 PROCEDURE — 1100000000 HC RM PRIVATE

## 2023-04-16 RX ADMIN — POLYETHYLENE GLYCOL 3350 17 G: 17 POWDER, FOR SOLUTION ORAL at 08:57

## 2023-04-16 RX ADMIN — SODIUM CHLORIDE: 9 INJECTION, SOLUTION INTRAVENOUS at 06:16

## 2023-04-16 RX ADMIN — HYDRALAZINE HYDROCHLORIDE 50 MG: 50 TABLET, FILM COATED ORAL at 06:16

## 2023-04-16 RX ADMIN — PANTOPRAZOLE SODIUM 40 MG: 40 TABLET, DELAYED RELEASE ORAL at 06:16

## 2023-04-16 RX ADMIN — DOXEPIN HYDROCHLORIDE 100 MG: 50 CAPSULE ORAL at 20:53

## 2023-04-16 RX ADMIN — GUANFACINE 2 MG: 1 TABLET ORAL at 08:57

## 2023-04-16 RX ADMIN — SODIUM CHLORIDE, PRESERVATIVE FREE 10 ML: 5 INJECTION INTRAVENOUS at 08:59

## 2023-04-16 RX ADMIN — SODIUM CHLORIDE, PRESERVATIVE FREE 10 ML: 5 INJECTION INTRAVENOUS at 20:53

## 2023-04-16 RX ADMIN — SODIUM CHLORIDE: 9 INJECTION, SOLUTION INTRAVENOUS at 20:53

## 2023-04-16 RX ADMIN — ALPRAZOLAM 1 MG: 0.5 TABLET ORAL at 11:33

## 2023-04-16 RX ADMIN — INSULIN LISPRO 4 UNITS: 100 INJECTION, SOLUTION INTRAVENOUS; SUBCUTANEOUS at 11:33

## 2023-04-16 RX ADMIN — INSULIN GLARGINE 12 UNITS: 100 INJECTION, SOLUTION SUBCUTANEOUS at 08:55

## 2023-04-16 RX ADMIN — HYDRALAZINE HYDROCHLORIDE 50 MG: 50 TABLET, FILM COATED ORAL at 15:06

## 2023-04-16 RX ADMIN — NIFEDIPINE 30 MG: 30 TABLET, EXTENDED RELEASE ORAL at 08:57

## 2023-04-16 RX ADMIN — ATENOLOL 50 MG: 50 TABLET ORAL at 20:53

## 2023-04-16 ASSESSMENT — ENCOUNTER SYMPTOMS
DIARRHEA: 0
SHORTNESS OF BREATH: 0
BLOOD IN STOOL: 0
COUGH: 0
ABDOMINAL DISTENTION: 0
CONSTIPATION: 0
EYES NEGATIVE: 1
BACK PAIN: 0
SINUS PRESSURE: 0
WHEEZING: 0
SORE THROAT: 0
NAUSEA: 0
VOMITING: 0
CHEST TIGHTNESS: 0
ABDOMINAL PAIN: 0

## 2023-04-16 ASSESSMENT — PAIN DESCRIPTION - LOCATION: LOCATION: LEG

## 2023-04-17 VITALS
SYSTOLIC BLOOD PRESSURE: 140 MMHG | RESPIRATION RATE: 16 BRPM | DIASTOLIC BLOOD PRESSURE: 86 MMHG | HEIGHT: 62 IN | WEIGHT: 167 LBS | OXYGEN SATURATION: 100 % | BODY MASS INDEX: 30.73 KG/M2 | HEART RATE: 67 BPM | TEMPERATURE: 97.8 F

## 2023-04-17 LAB
ALBUMIN SERPL-MCNC: 2.2 G/DL (ref 3.4–5)
ALBUMIN/GLOB SERPL: 0.8 (ref 0.8–1.7)
ALP SERPL-CCNC: 65 U/L (ref 45–117)
ALT SERPL-CCNC: 49 U/L (ref 13–56)
ANION GAP SERPL CALC-SCNC: 6 MMOL/L (ref 3–18)
AST SERPL-CCNC: 34 U/L (ref 10–38)
BASOPHILS # BLD: 0 K/UL (ref 0–0.1)
BASOPHILS NFR BLD: 1 % (ref 0–2)
BILIRUB SERPL-MCNC: 0.2 MG/DL (ref 0.2–1)
BUN SERPL-MCNC: 43 MG/DL (ref 7–18)
BUN/CREAT SERPL: 16 (ref 12–20)
CALCIUM SERPL-MCNC: 8.8 MG/DL (ref 8.5–10.1)
CHLORIDE SERPL-SCNC: 111 MMOL/L (ref 100–111)
CO2 SERPL-SCNC: 23 MMOL/L (ref 21–32)
CREAT SERPL-MCNC: 2.62 MG/DL (ref 0.6–1.3)
DIFFERENTIAL METHOD BLD: ABNORMAL
EOSINOPHIL # BLD: 0.2 K/UL (ref 0–0.4)
EOSINOPHIL NFR BLD: 3 % (ref 0–5)
ERYTHROCYTE [DISTWIDTH] IN BLOOD BY AUTOMATED COUNT: 13 % (ref 11.6–14.5)
GLOBULIN SER CALC-MCNC: 2.8 G/DL (ref 2–4)
GLUCOSE BLD STRIP.AUTO-MCNC: 184 MG/DL (ref 70–110)
GLUCOSE BLD STRIP.AUTO-MCNC: 95 MG/DL (ref 70–110)
GLUCOSE SERPL-MCNC: 70 MG/DL (ref 74–99)
HCT VFR BLD AUTO: 26 % (ref 35–45)
HGB BLD-MCNC: 8.5 G/DL (ref 12–16)
IMM GRANULOCYTES # BLD AUTO: 0.1 K/UL (ref 0–0.04)
IMM GRANULOCYTES NFR BLD AUTO: 1 % (ref 0–0.5)
LYMPHOCYTES # BLD: 2.1 K/UL (ref 0.9–3.6)
LYMPHOCYTES NFR BLD: 33 % (ref 21–52)
MAGNESIUM SERPL-MCNC: 1.6 MG/DL (ref 1.6–2.6)
MCH RBC QN AUTO: 33.1 PG (ref 24–34)
MCHC RBC AUTO-ENTMCNC: 32.7 G/DL (ref 31–37)
MCV RBC AUTO: 101.2 FL (ref 78–100)
MONOCYTES # BLD: 0.4 K/UL (ref 0.05–1.2)
MONOCYTES NFR BLD: 7 % (ref 3–10)
NEUTS SEG # BLD: 3.6 K/UL (ref 1.8–8)
NEUTS SEG NFR BLD: 56 % (ref 40–73)
NRBC # BLD: 0 K/UL (ref 0–0.01)
NRBC BLD-RTO: 0 PER 100 WBC
PHOSPHATE SERPL-MCNC: 4.6 MG/DL (ref 2.5–4.9)
PLATELET # BLD AUTO: 210 K/UL (ref 135–420)
PMV BLD AUTO: 10.8 FL (ref 9.2–11.8)
POTASSIUM SERPL-SCNC: 3.9 MMOL/L (ref 3.5–5.5)
PROT SERPL-MCNC: 5 G/DL (ref 6.4–8.2)
RBC # BLD AUTO: 2.57 M/UL (ref 4.2–5.3)
SODIUM SERPL-SCNC: 140 MMOL/L (ref 136–145)
WBC # BLD AUTO: 6.4 K/UL (ref 4.6–13.2)

## 2023-04-17 PROCEDURE — 6370000000 HC RX 637 (ALT 250 FOR IP): Performed by: HOSPITALIST

## 2023-04-17 PROCEDURE — 83735 ASSAY OF MAGNESIUM: CPT

## 2023-04-17 PROCEDURE — 6370000000 HC RX 637 (ALT 250 FOR IP): Performed by: INTERNAL MEDICINE

## 2023-04-17 PROCEDURE — 80053 COMPREHEN METABOLIC PANEL: CPT

## 2023-04-17 PROCEDURE — 85025 COMPLETE CBC W/AUTO DIFF WBC: CPT

## 2023-04-17 PROCEDURE — 82962 GLUCOSE BLOOD TEST: CPT

## 2023-04-17 PROCEDURE — 84100 ASSAY OF PHOSPHORUS: CPT

## 2023-04-17 PROCEDURE — 36415 COLL VENOUS BLD VENIPUNCTURE: CPT

## 2023-04-17 PROCEDURE — 6370000000 HC RX 637 (ALT 250 FOR IP): Performed by: FAMILY MEDICINE

## 2023-04-17 RX ORDER — NIFEDIPINE 30 MG/1
30 TABLET, EXTENDED RELEASE ORAL DAILY
Qty: 30 TABLET | Refills: 0
Start: 2023-04-18

## 2023-04-17 RX ORDER — ALPRAZOLAM 1 MG/1
1 TABLET ORAL 3 TIMES DAILY PRN
Qty: 10 TABLET | Refills: 0 | Status: SHIPPED | OUTPATIENT
Start: 2023-04-17 | End: 2023-04-20

## 2023-04-17 RX ORDER — POLYETHYLENE GLYCOL 3350 17 G/17G
17 POWDER, FOR SOLUTION ORAL DAILY PRN
Qty: 527 G | Refills: 0
Start: 2023-04-17 | End: 2023-05-17

## 2023-04-17 RX ORDER — HYDRALAZINE HYDROCHLORIDE 50 MG/1
50 TABLET, FILM COATED ORAL EVERY 8 HOURS SCHEDULED
Qty: 90 TABLET | Refills: 0
Start: 2023-04-17

## 2023-04-17 RX ORDER — INSULIN GLARGINE 100 [IU]/ML
12 INJECTION, SOLUTION SUBCUTANEOUS DAILY
Qty: 10 ML | Refills: 0
Start: 2023-04-18

## 2023-04-17 RX ADMIN — HYDRALAZINE HYDROCHLORIDE 50 MG: 50 TABLET, FILM COATED ORAL at 06:40

## 2023-04-17 RX ADMIN — PANTOPRAZOLE SODIUM 40 MG: 40 TABLET, DELAYED RELEASE ORAL at 06:41

## 2023-04-17 RX ADMIN — ALPRAZOLAM 1 MG: 0.5 TABLET ORAL at 11:04

## 2023-04-17 RX ADMIN — ACETAMINOPHEN 1000 MG: 500 TABLET ORAL at 14:13

## 2023-04-17 RX ADMIN — HYDRALAZINE HYDROCHLORIDE 50 MG: 50 TABLET, FILM COATED ORAL at 14:13

## 2023-04-17 RX ADMIN — GUANFACINE 2 MG: 1 TABLET ORAL at 09:03

## 2023-04-17 RX ADMIN — INSULIN GLARGINE 12 UNITS: 100 INJECTION, SOLUTION SUBCUTANEOUS at 09:00

## 2023-04-17 RX ADMIN — NIFEDIPINE 30 MG: 30 TABLET, EXTENDED RELEASE ORAL at 09:05

## 2023-04-17 RX ADMIN — ACETAMINOPHEN 1000 MG: 500 TABLET ORAL at 01:11

## 2023-04-17 RX ADMIN — ALPRAZOLAM 1 MG: 0.5 TABLET ORAL at 01:11

## 2023-04-17 ASSESSMENT — PAIN DESCRIPTION - LOCATION: LOCATION: HEAD

## 2023-04-17 ASSESSMENT — PAIN SCALES - GENERAL
PAINLEVEL_OUTOF10: 0
PAINLEVEL_OUTOF10: 4

## 2023-04-17 ASSESSMENT — PAIN DESCRIPTION - ORIENTATION: ORIENTATION: ANTERIOR

## 2023-04-17 NOTE — PLAN OF CARE
Problem: Pain  Goal: Verbalizes/displays adequate comfort level or baseline comfort level  4/17/2023 1404 by Jack Young RN  Outcome: Adequate for Discharge  4/17/2023 1404 by Jack Young RN  Outcome: Adequate for Discharge     Problem: Skin/Tissue Integrity  Goal: Absence of new skin breakdown  Description: 1. Monitor for areas of redness and/or skin breakdown  2. Assess vascular access sites hourly  3. Every 4-6 hours minimum:  Change oxygen saturation probe site  4. Every 4-6 hours:  If on nasal continuous positive airway pressure, respiratory therapy assess nares and determine need for appliance change or resting period.   Outcome: Adequate for Discharge     Problem: ABCDS Injury Assessment  Goal: Absence of physical injury  Outcome: Adequate for Discharge     Problem: Safety - Adult  Goal: Free from fall injury  Outcome: Adequate for Discharge     Problem: Chronic Conditions and Co-morbidities  Goal: Patient's chronic conditions and co-morbidity symptoms are monitored and maintained or improved  Outcome: Adequate for Discharge

## 2023-04-17 NOTE — DISCHARGE SUMMARY
bilaterally. Cardiac exam, regular rate and rhythm. Abdomen is soft, nontender with normal bowel sounds and lower extremities are without edema. Total time on discharge 45 minutes.       Florence Perla MD      [id number]  D:  04/17/2023 12:54  T:  04/17/2023 13:11  JOB #:  [id number]  CC:  Betito Gomez MD

## 2023-04-17 NOTE — PROGRESS NOTES
Dr. Xenia Ellis paged for oral potassium order of 40 mEq and patient potassium level 3.9.    1122 Dr. Xenia Ellis stated to still give patient potassium. Patient to be discharged and balderrama is to be left in for discharge. 18 Dr. Xenia Ellis paged for patient medication script signature.
Nephrology Progress Note    Patient: Steff Rollins        History of Present Illness:  Steff Rollins is a 69 y.o. female with a past medical history significant for HTN, T2DM, CKD who was brought to the ED after she was found on the floor at home. Patient has had recurrent falls at home recently. She c/o lower extremity pains. Initial labs revealed a S Cr 3.36, CPK 12,219 , increased transaminases and and WBC 13.7 Hgb 8.2  U/S of abd did not show obstruction. Available baseline S Cr 2.25 on 12/6/22  Patient started given IVF LR bolus and  admitted , she has been continued on IV fluids. Patient states that she has seen a Urologist but not a nephrologist in the past        -Pt w/o new complaints. No SOB. Davis catheter with good Urine Output.       Patient Active Problem List   Diagnosis    Essential hypertension    Epistaxis    ARIC (acute kidney injury) ([facility] [address]    Multiple falls    Unable to ambulate    Type 2 diabetes mellitus (HCC)    Tobacco use    Acute anemia    CKD stage 3 due to type 2 diabetes mellitus (HCC)    Rhabdomyolysis    UTI (urinary tract infection)       Inpatient meds:  Current Facility-Administered Medications   Medication Dose Route Frequency    polyethylene glycol (GLYCOLAX) packet 17 g  17 g Oral Daily    potassium chloride (KLOR-CON M) extended release tablet 40 mEq  40 mEq Oral Daily with breakfast    hydrALAZINE (APRESOLINE) tablet 50 mg  50 mg Oral 3 times per day    nicotine (NICODERM CQ) 21 MG/24HR 1 patch  1 patch TransDERmal Daily    insulin lispro (HUMALOG) injection vial 0-8 Units  0-8 Units SubCUTAneous TID WC    insulin lispro (HUMALOG) injection vial 0-4 Units  0-4 Units SubCUTAneous Nightly    ALPRAZolam (XANAX) tablet 1 mg  1 mg Oral TID PRN    insulin glargine (LANTUS) injection vial 12 Units  12 Units SubCUTAneous Daily    LORazepam (ATIVAN) injection 0.5 mg  0.5 mg IntraVENous Q4H PRN    0.9 % sodium chloride infusion   IntraVENous Continuous    glucose chewable
Patient discharged to [facility] in care of [facility] after brief report provided about patient. IV removed with catheter intact. Patient discharged with balderrama per doctor instructions and facility aware. Paperwork and medication script sent with patient.
Report called to Can Ibarra at [facility] for patient transfer.
dc summary, and AVS in packet. Please medicate for pain prior to dc if possible and needed to help offset delay when patient first arrives to facility. Reviewed and confirmed with facility, [facility]  can manage the patient care needs for the following:     Alena Bernal with (X) only those applicable:  Medication:  []Medications are available at the facility  []IV Antibiotics    []Controlled Substance - hard copies available sent. []Weekly Labs    Equipment:  []CPAP/BiPAP  []Wound Vacuum  []Davis or Urinary Device  []PICC/Central Line  []Nebulizer  []Ventilator    Treatment:  []Isolation (for MRSA, VRE, etc.)  []Surgical Drain Management  []Tracheostomy Care  []Dressing Changes  []Dialysis with transportation  []PEG Care  []Oxygen  []Daily Weights for Heart Failure    Dietary:  []Any diet limitations  []Tube Feedings   []Total Parenteral Management (TPN)    Financial Resources:  []Medicaid Application Completed    []UAI Completed and copy given to pt/family    []A screening has previously been completed. []Level II Completed    [] Private pay individual who will not become   financially eligible for Medicaid within 6 months from admission to a [address] [place] [address]. [] Individual refused to have screening conducted. []Medicaid Application Completed    []The screening denied because it was determined individual did not need/did not qualify for nursing facility level of care. [] Out of state residents seeking direct admission to a [address] facility.   [] Individuals who are inpatients of an out of state hospital, or in state or out of state veterans/ hospital and seek direct admission to a [address] facility  [] Individuals who are pateints or residents of a state owned/operated facility that is licensed by [employer] and seek direct admission to [address] [place] [address]  [] A screening not required for enrollment in [facility] Hospice services as set out in [facility]

## 2023-04-17 NOTE — PLAN OF CARE
Problem: Pain  Goal: Verbalizes/displays adequate comfort level or baseline comfort level  Outcome: Progressing  Flowsheets (Taken 4/16/2023 2050)  Verbalizes/displays adequate comfort level or baseline comfort level:   Encourage patient to monitor pain and request assistance   Assess pain using appropriate pain scale   Administer analgesics based on type and severity of pain and evaluate response   Implement non-pharmacological measures as appropriate and evaluate response     Problem: Skin/Tissue Integrity  Goal: Absence of new skin breakdown  Description: 1. Monitor for areas of redness and/or skin breakdown  2. Assess vascular access sites hourly  3. Every 4-6 hours minimum:  Change oxygen saturation probe site  4. Every 4-6 hours:  If on nasal continuous positive airway pressure, respiratory therapy assess nares and determine need for appliance change or resting period.   Outcome: Progressing     Problem: ABCDS Injury Assessment  Goal: Absence of physical injury  Outcome: Progressing  Flowsheets (Taken 4/16/2023 2050)  Absence of Physical Injury: Implement safety measures based on patient assessment     Problem: Safety - Adult  Goal: Free from fall injury  Outcome: Progressing  Flowsheets (Taken 4/16/2023 2050)  Free From Fall Injury:   Instruct family/caregiver on patient safety   Based on caregiver fall risk screen, instruct family/caregiver to ask for assistance with transferring infant if caregiver noted to have fall risk factors     Problem: Chronic Conditions and Co-morbidities  Goal: Patient's chronic conditions and co-morbidity symptoms are monitored and maintained or improved  Outcome: Progressing  Flowsheets (Taken 4/16/2023 2050)  Care Plan - Patient's Chronic Conditions and Co-Morbidity Symptoms are Monitored and Maintained or Improved:   Monitor and assess patient's chronic conditions and comorbid symptoms for stability, deterioration, or improvement   Collaborate with multidisciplinary team

## 2023-05-08 PROBLEM — N39.0 UTI (URINARY TRACT INFECTION): Status: RESOLVED | Noted: 2023-04-08 | Resolved: 2023-05-08

## 2023-07-11 ENCOUNTER — TRANSCRIBE ORDERS (OUTPATIENT)
Facility: HOSPITAL | Age: 78
End: 2023-07-11

## 2023-07-11 DIAGNOSIS — N13.39 OTHER HYDRONEPHROSIS: Primary | ICD-10-CM

## 2023-07-22 ENCOUNTER — HOSPITAL ENCOUNTER (OUTPATIENT)
Facility: HOSPITAL | Age: 78
End: 2023-07-22
Attending: UROLOGY
Payer: MEDICARE

## 2023-07-22 DIAGNOSIS — N13.39 OTHER HYDRONEPHROSIS: ICD-10-CM

## 2023-07-22 PROCEDURE — 76770 US EXAM ABDO BACK WALL COMP: CPT

## 2024-04-01 ENCOUNTER — APPOINTMENT (OUTPATIENT)
Facility: HOSPITAL | Age: 79
DRG: 208 | End: 2024-04-01
Payer: MEDICARE

## 2024-04-01 ENCOUNTER — APPOINTMENT (OUTPATIENT)
Facility: HOSPITAL | Age: 79
DRG: 208 | End: 2024-04-01
Attending: INTERNAL MEDICINE
Payer: MEDICARE

## 2024-04-01 ENCOUNTER — HOSPITAL ENCOUNTER (INPATIENT)
Facility: HOSPITAL | Age: 79
LOS: 8 days | Discharge: ANOTHER HOSPITAL | DRG: 208 | End: 2024-04-09
Attending: EMERGENCY MEDICINE | Admitting: INTERNAL MEDICINE
Payer: MEDICARE

## 2024-04-01 ENCOUNTER — APPOINTMENT (OUTPATIENT)
Facility: HOSPITAL | Age: 79
DRG: 208 | End: 2024-04-01
Attending: EMERGENCY MEDICINE
Payer: MEDICARE

## 2024-04-01 DIAGNOSIS — R79.89 ELEVATED BRAIN NATRIURETIC PEPTIDE (BNP) LEVEL: ICD-10-CM

## 2024-04-01 DIAGNOSIS — E87.1 HYPONATREMIA: ICD-10-CM

## 2024-04-01 DIAGNOSIS — J81.0 ACUTE PULMONARY EDEMA (HCC): Primary | ICD-10-CM

## 2024-04-01 DIAGNOSIS — I95.9 HYPOTENSION, UNSPECIFIED HYPOTENSION TYPE: ICD-10-CM

## 2024-04-01 DIAGNOSIS — R09.02 HYPOXEMIA: ICD-10-CM

## 2024-04-01 DIAGNOSIS — R53.1 GENERALIZED WEAKNESS: ICD-10-CM

## 2024-04-01 DIAGNOSIS — J96.01 ACUTE RESPIRATORY FAILURE WITH HYPOXIA (HCC): ICD-10-CM

## 2024-04-01 DIAGNOSIS — I50.1: ICD-10-CM

## 2024-04-01 LAB
ALBUMIN SERPL-MCNC: 3.3 G/DL (ref 3.4–5)
ALBUMIN/GLOB SERPL: 0.7 (ref 0.8–1.7)
ALP SERPL-CCNC: 90 U/L (ref 45–117)
ALT SERPL-CCNC: 18 U/L (ref 13–56)
AMORPH CRY URNS QL MICRO: ABNORMAL
ANION GAP SERPL CALC-SCNC: 8 MMOL/L (ref 3–18)
APPEARANCE UR: CLEAR
ARTERIAL PATENCY WRIST A: POSITIVE
AST SERPL-CCNC: 18 U/L (ref 10–38)
BACTERIA URNS QL MICRO: ABNORMAL /HPF
BASE DEFICIT BLD-SCNC: 5.6 MMOL/L
BASOPHILS # BLD: 0 K/UL (ref 0–0.1)
BASOPHILS NFR BLD: 1 % (ref 0–2)
BDY SITE: ABNORMAL
BILIRUB SERPL-MCNC: 0.5 MG/DL (ref 0.2–1)
BILIRUB UR QL: NEGATIVE
BUN SERPL-MCNC: 53 MG/DL (ref 7–18)
BUN/CREAT SERPL: 17 (ref 12–20)
CALCIUM SERPL-MCNC: 9.5 MG/DL (ref 8.5–10.1)
CHLORIDE SERPL-SCNC: 101 MMOL/L (ref 100–111)
CO2 SERPL-SCNC: 21 MMOL/L (ref 21–32)
COLOR UR: YELLOW
CREAT SERPL-MCNC: 3.06 MG/DL (ref 0.6–1.3)
DIFFERENTIAL METHOD BLD: ABNORMAL
ECHO AO ROOT DIAM: 3 CM
ECHO AO ROOT INDEX: 1.74 CM/M2
ECHO AV AREA PEAK VELOCITY: 2 CM2
ECHO AV AREA VTI: 2.2 CM2
ECHO AV AREA/BSA PEAK VELOCITY: 1.2 CM2/M2
ECHO AV AREA/BSA VTI: 1.3 CM2/M2
ECHO AV MEAN GRADIENT: 4 MMHG
ECHO AV MEAN VELOCITY: 1 M/S
ECHO AV PEAK GRADIENT: 7 MMHG
ECHO AV PEAK VELOCITY: 1.4 M/S
ECHO AV VELOCITY RATIO: 0.64
ECHO AV VTI: 24.9 CM
ECHO BSA: 1.76 M2
ECHO BSA: 1.76 M2
ECHO EST RA PRESSURE: 3 MMHG
ECHO LA DIAMETER INDEX: 1.8 CM/M2
ECHO LA DIAMETER: 3.1 CM
ECHO LA TO AORTIC ROOT RATIO: 1.03
ECHO LV E' LATERAL VELOCITY: 6 CM/S
ECHO LV E' SEPTAL VELOCITY: 9 CM/S
ECHO LV EDV A2C: 84 ML
ECHO LV EDV A4C: 80 ML
ECHO LV EDV BP: 81 ML (ref 56–104)
ECHO LV EDV INDEX A4C: 47 ML/M2
ECHO LV EDV INDEX BP: 47 ML/M2
ECHO LV EDV NDEX A2C: 49 ML/M2
ECHO LV EJECTION FRACTION A2C: 47 %
ECHO LV EJECTION FRACTION A4C: 58 %
ECHO LV EJECTION FRACTION BIPLANE: 50 % (ref 55–100)
ECHO LV ESV A2C: 45 ML
ECHO LV ESV A4C: 34 ML
ECHO LV ESV BP: 40 ML (ref 19–49)
ECHO LV ESV INDEX A2C: 26 ML/M2
ECHO LV ESV INDEX A4C: 20 ML/M2
ECHO LV ESV INDEX BP: 23 ML/M2
ECHO LV FRACTIONAL SHORTENING: 36 % (ref 28–44)
ECHO LV INTERNAL DIMENSION DIASTOLE INDEX: 2.44 CM/M2
ECHO LV INTERNAL DIMENSION DIASTOLIC: 4.2 CM (ref 3.9–5.3)
ECHO LV INTERNAL DIMENSION SYSTOLIC INDEX: 1.57 CM/M2
ECHO LV INTERNAL DIMENSION SYSTOLIC: 2.7 CM
ECHO LV IVSD: 0.9 CM (ref 0.6–0.9)
ECHO LV MASS 2D: 127.8 G (ref 67–162)
ECHO LV MASS INDEX 2D: 74.3 G/M2 (ref 43–95)
ECHO LV POSTERIOR WALL DIASTOLIC: 1 CM (ref 0.6–0.9)
ECHO LV RELATIVE WALL THICKNESS RATIO: 0.48
ECHO LVOT AREA: 3.1 CM2
ECHO LVOT AV VTI INDEX: 0.69
ECHO LVOT DIAM: 2 CM
ECHO LVOT MEAN GRADIENT: 2 MMHG
ECHO LVOT PEAK GRADIENT: 3 MMHG
ECHO LVOT PEAK VELOCITY: 0.9 M/S
ECHO LVOT STROKE VOLUME INDEX: 31.6 ML/M2
ECHO LVOT SV: 54.3 ML
ECHO LVOT VTI: 17.3 CM
ECHO MV A VELOCITY: 1.55 M/S
ECHO MV AREA PHT: 4.4 CM2
ECHO MV AREA VTI: 1.7 CM2
ECHO MV E DECELERATION TIME (DT): 117.7 MS
ECHO MV E VELOCITY: 1.01 M/S
ECHO MV E/A RATIO: 0.65
ECHO MV E/E' LATERAL: 16.83
ECHO MV E/E' RATIO (AVERAGED): 14.03
ECHO MV LVOT VTI INDEX: 1.86
ECHO MV MAX VELOCITY: 1.6 M/S
ECHO MV MEAN GRADIENT: 4 MMHG
ECHO MV MEAN VELOCITY: 0.9 M/S
ECHO MV PEAK GRADIENT: 10 MMHG
ECHO MV PRESSURE HALF TIME (PHT): 49.5 MS
ECHO MV VTI: 32.2 CM
ECHO PV MAX VELOCITY: 1.1 M/S
ECHO PV PEAK GRADIENT: 5 MMHG
ECHO RIGHT VENTRICULAR SYSTOLIC PRESSURE (RVSP): 37 MMHG
ECHO RV INTERNAL DIMENSION: 2.9 CM
ECHO RV TAPSE: 1.7 CM (ref 1.7–?)
ECHO RVOT MEAN GRADIENT: 3 MMHG
ECHO RVOT PEAK GRADIENT: 5 MMHG
ECHO RVOT PEAK VELOCITY: 1.1 M/S
ECHO RVOT VTI: 17.3 CM
ECHO TV REGURGITANT MAX VELOCITY: 2.92 M/S
ECHO TV REGURGITANT PEAK GRADIENT: 34 MMHG
EOSINOPHIL # BLD: 0 K/UL (ref 0–0.4)
EOSINOPHIL NFR BLD: 0 % (ref 0–5)
EPITH CASTS URNS QL MICRO: ABNORMAL /LPF (ref 0–5)
ERYTHROCYTE [DISTWIDTH] IN BLOOD BY AUTOMATED COUNT: 12.4 % (ref 11.6–14.5)
FLUAV RNA SPEC QL NAA+PROBE: NOT DETECTED
FLUBV RNA SPEC QL NAA+PROBE: NOT DETECTED
GAS FLOW.O2 O2 DELIVERY SYS: ABNORMAL
GAS FLOW.O2 SETTING OXYMISER: 34 BPM
GLOBULIN SER CALC-MCNC: 4.5 G/DL (ref 2–4)
GLUCOSE BLD STRIP.AUTO-MCNC: 129 MG/DL (ref 70–110)
GLUCOSE BLD STRIP.AUTO-MCNC: 134 MG/DL (ref 70–110)
GLUCOSE BLD STRIP.AUTO-MCNC: 138 MG/DL (ref 70–110)
GLUCOSE BLD STRIP.AUTO-MCNC: 167 MG/DL (ref 70–110)
GLUCOSE SERPL-MCNC: 203 MG/DL (ref 74–99)
GLUCOSE UR STRIP.AUTO-MCNC: 250 MG/DL
HCO3 BLD-SCNC: 18.5 MMOL/L (ref 22–26)
HCT VFR BLD AUTO: 26.6 % (ref 35–45)
HGB BLD-MCNC: 9.1 G/DL (ref 12–16)
HGB UR QL STRIP: ABNORMAL
IMM GRANULOCYTES # BLD AUTO: 0 K/UL (ref 0–0.04)
IMM GRANULOCYTES NFR BLD AUTO: 1 % (ref 0–0.5)
IPAP/PIP/HIGH PEEP: 12
KETONES UR QL STRIP.AUTO: NEGATIVE MG/DL
LEUKOCYTE ESTERASE UR QL STRIP.AUTO: NEGATIVE
LYMPHOCYTES # BLD: 0.2 K/UL (ref 0.9–3.6)
LYMPHOCYTES NFR BLD: 4 % (ref 21–52)
MCH RBC QN AUTO: 33 PG (ref 24–34)
MCHC RBC AUTO-ENTMCNC: 34.2 G/DL (ref 31–37)
MCV RBC AUTO: 96.4 FL (ref 78–100)
MONOCYTES # BLD: 0.4 K/UL (ref 0.05–1.2)
MONOCYTES NFR BLD: 6 % (ref 3–10)
NEUTS SEG # BLD: 5.2 K/UL (ref 1.8–8)
NEUTS SEG NFR BLD: 89 % (ref 40–73)
NITRITE UR QL STRIP.AUTO: NEGATIVE
NRBC # BLD: 0 K/UL (ref 0–0.01)
NRBC BLD-RTO: 0 PER 100 WBC
NT PRO BNP: 2708 PG/ML (ref 0–1800)
O2/TOTAL GAS SETTING VFR VENT: 40 %
PCO2 BLD: 30.2 MMHG (ref 35–45)
PEEP RESPIRATORY: 5 CMH2O
PH BLD: 7.4 (ref 7.35–7.45)
PH UR STRIP: 6 (ref 5–8)
PLATELET # BLD AUTO: 214 K/UL (ref 135–420)
PMV BLD AUTO: 10.2 FL (ref 9.2–11.8)
PO2 BLD: 126 MMHG (ref 80–100)
POTASSIUM SERPL-SCNC: 4.4 MMOL/L (ref 3.5–5.5)
PROCALCITONIN SERPL-MCNC: 0.27 NG/ML
PROT SERPL-MCNC: 7.8 G/DL (ref 6.4–8.2)
PROT UR STRIP-MCNC: >1000 MG/DL
RBC # BLD AUTO: 2.76 M/UL (ref 4.2–5.3)
RBC #/AREA URNS HPF: ABNORMAL /HPF (ref 0–5)
SAO2 % BLD: 98.9 % (ref 92–97)
SARS-COV-2 RNA RESP QL NAA+PROBE: NOT DETECTED
SERVICE CMNT-IMP: ABNORMAL
SODIUM SERPL-SCNC: 130 MMOL/L (ref 136–145)
SP GR UR REFRACTOMETRY: 1.02 (ref 1–1.03)
SPECIMEN TYPE: ABNORMAL
TROPONIN I SERPL HS-MCNC: 49 NG/L (ref 0–54)
UROBILINOGEN UR QL STRIP.AUTO: 0.2 EU/DL (ref 0.2–1)
VENTILATION MODE VENT: ABNORMAL
WBC # BLD AUTO: 5.9 K/UL (ref 4.6–13.2)
WBC URNS QL MICRO: ABNORMAL /HPF (ref 0–5)

## 2024-04-01 PROCEDURE — 82803 BLOOD GASES ANY COMBINATION: CPT

## 2024-04-01 PROCEDURE — 80053 COMPREHEN METABOLIC PANEL: CPT

## 2024-04-01 PROCEDURE — 36600 WITHDRAWAL OF ARTERIAL BLOOD: CPT

## 2024-04-01 PROCEDURE — 2580000003 HC RX 258: Performed by: EMERGENCY MEDICINE

## 2024-04-01 PROCEDURE — 93306 TTE W/DOPPLER COMPLETE: CPT

## 2024-04-01 PROCEDURE — 84145 PROCALCITONIN (PCT): CPT

## 2024-04-01 PROCEDURE — 84484 ASSAY OF TROPONIN QUANT: CPT

## 2024-04-01 PROCEDURE — 87636 SARSCOV2 & INF A&B AMP PRB: CPT

## 2024-04-01 PROCEDURE — 71045 X-RAY EXAM CHEST 1 VIEW: CPT

## 2024-04-01 PROCEDURE — 85025 COMPLETE CBC W/AUTO DIFF WBC: CPT

## 2024-04-01 PROCEDURE — 6370000000 HC RX 637 (ALT 250 FOR IP): Performed by: EMERGENCY MEDICINE

## 2024-04-01 PROCEDURE — 96361 HYDRATE IV INFUSION ADD-ON: CPT

## 2024-04-01 PROCEDURE — 82962 GLUCOSE BLOOD TEST: CPT

## 2024-04-01 PROCEDURE — 94660 CPAP INITIATION&MGMT: CPT

## 2024-04-01 PROCEDURE — 99223 1ST HOSP IP/OBS HIGH 75: CPT | Performed by: INTERNAL MEDICINE

## 2024-04-01 PROCEDURE — 6370000000 HC RX 637 (ALT 250 FOR IP): Performed by: INTERNAL MEDICINE

## 2024-04-01 PROCEDURE — 96375 TX/PRO/DX INJ NEW DRUG ADDON: CPT

## 2024-04-01 PROCEDURE — 5A09457 ASSISTANCE WITH RESPIRATORY VENTILATION, 24-96 CONSECUTIVE HOURS, CONTINUOUS POSITIVE AIRWAY PRESSURE: ICD-10-PCS | Performed by: INTERNAL MEDICINE

## 2024-04-01 PROCEDURE — 6360000002 HC RX W HCPCS: Performed by: INTERNAL MEDICINE

## 2024-04-01 PROCEDURE — 2700000000 HC OXYGEN THERAPY PER DAY

## 2024-04-01 PROCEDURE — 99285 EMERGENCY DEPT VISIT HI MDM: CPT

## 2024-04-01 PROCEDURE — 93970 EXTREMITY STUDY: CPT

## 2024-04-01 PROCEDURE — 83880 ASSAY OF NATRIURETIC PEPTIDE: CPT

## 2024-04-01 PROCEDURE — 96374 THER/PROPH/DIAG INJ IV PUSH: CPT

## 2024-04-01 PROCEDURE — 6360000002 HC RX W HCPCS: Performed by: EMERGENCY MEDICINE

## 2024-04-01 PROCEDURE — 81001 URINALYSIS AUTO W/SCOPE: CPT

## 2024-04-01 PROCEDURE — 2000000000 HC ICU R&B

## 2024-04-01 RX ORDER — INSULIN LISPRO 100 [IU]/ML
0-4 INJECTION, SOLUTION INTRAVENOUS; SUBCUTANEOUS NIGHTLY
Status: DISCONTINUED | OUTPATIENT
Start: 2024-04-01 | End: 2024-04-09 | Stop reason: HOSPADM

## 2024-04-01 RX ORDER — NITROGLYCERIN 0.4 MG/1
0.4 TABLET SUBLINGUAL EVERY 5 MIN PRN
Status: DISCONTINUED | OUTPATIENT
Start: 2024-04-01 | End: 2024-04-01

## 2024-04-01 RX ORDER — INSULIN GLARGINE 100 [IU]/ML
12 INJECTION, SOLUTION SUBCUTANEOUS DAILY
Status: CANCELLED | OUTPATIENT
Start: 2024-04-01

## 2024-04-01 RX ORDER — IPRATROPIUM BROMIDE AND ALBUTEROL SULFATE 2.5; .5 MG/3ML; MG/3ML
1 SOLUTION RESPIRATORY (INHALATION) EVERY 4 HOURS PRN
Status: DISCONTINUED | OUTPATIENT
Start: 2024-04-01 | End: 2024-04-06

## 2024-04-01 RX ORDER — HYDRALAZINE HYDROCHLORIDE 50 MG/1
50 TABLET, FILM COATED ORAL EVERY 8 HOURS SCHEDULED
Status: DISCONTINUED | OUTPATIENT
Start: 2024-04-01 | End: 2024-04-09 | Stop reason: HOSPADM

## 2024-04-01 RX ORDER — DOXEPIN HYDROCHLORIDE 50 MG/1
100 CAPSULE ORAL NIGHTLY
Status: CANCELLED | OUTPATIENT
Start: 2024-04-01

## 2024-04-01 RX ORDER — LOSARTAN POTASSIUM 25 MG/1
25 TABLET ORAL DAILY
Status: ON HOLD | COMMUNITY

## 2024-04-01 RX ORDER — ACETAMINOPHEN 325 MG/1
650 TABLET ORAL EVERY 6 HOURS PRN
Status: CANCELLED | OUTPATIENT
Start: 2024-04-01

## 2024-04-01 RX ORDER — CARVEDILOL 3.12 MG/1
6.25 TABLET ORAL 2 TIMES DAILY
Status: DISCONTINUED | OUTPATIENT
Start: 2024-04-01 | End: 2024-04-09 | Stop reason: HOSPADM

## 2024-04-01 RX ORDER — INSULIN LISPRO 100 [IU]/ML
0-4 INJECTION, SOLUTION INTRAVENOUS; SUBCUTANEOUS
Status: DISCONTINUED | OUTPATIENT
Start: 2024-04-01 | End: 2024-04-09 | Stop reason: HOSPADM

## 2024-04-01 RX ORDER — FUROSEMIDE 10 MG/ML
60 INJECTION INTRAMUSCULAR; INTRAVENOUS 2 TIMES DAILY
Status: CANCELLED | OUTPATIENT
Start: 2024-04-01

## 2024-04-01 RX ORDER — LABETALOL 200 MG/1
200 TABLET, FILM COATED ORAL 2 TIMES DAILY
Status: ON HOLD | COMMUNITY

## 2024-04-01 RX ORDER — NIFEDIPINE 30 MG/1
30 TABLET, EXTENDED RELEASE ORAL DAILY
Status: DISCONTINUED | OUTPATIENT
Start: 2024-04-01 | End: 2024-04-09 | Stop reason: HOSPADM

## 2024-04-01 RX ORDER — FUROSEMIDE 40 MG/1
40 TABLET ORAL DAILY
Status: DISCONTINUED | OUTPATIENT
Start: 2024-04-02 | End: 2024-04-02

## 2024-04-01 RX ORDER — HEPARIN SODIUM 5000 [USP'U]/ML
5000 INJECTION, SOLUTION INTRAVENOUS; SUBCUTANEOUS EVERY 8 HOURS SCHEDULED
Status: CANCELLED | OUTPATIENT
Start: 2024-04-01

## 2024-04-01 RX ORDER — HYDRALAZINE HYDROCHLORIDE 20 MG/ML
10 INJECTION INTRAMUSCULAR; INTRAVENOUS
Status: COMPLETED | OUTPATIENT
Start: 2024-04-01 | End: 2024-04-01

## 2024-04-01 RX ORDER — ATORVASTATIN CALCIUM 40 MG/1
60 TABLET, FILM COATED ORAL DAILY
Status: ON HOLD | COMMUNITY

## 2024-04-01 RX ORDER — SODIUM CHLORIDE 9 MG/ML
INJECTION, SOLUTION INTRAVENOUS PRN
Status: CANCELLED | OUTPATIENT
Start: 2024-04-01

## 2024-04-01 RX ORDER — ACETAMINOPHEN 650 MG/1
650 SUPPOSITORY RECTAL EVERY 6 HOURS PRN
Status: CANCELLED | OUTPATIENT
Start: 2024-04-01

## 2024-04-01 RX ORDER — FUROSEMIDE 10 MG/ML
60 INJECTION INTRAMUSCULAR; INTRAVENOUS
Status: COMPLETED | OUTPATIENT
Start: 2024-04-01 | End: 2024-04-01

## 2024-04-01 RX ORDER — FUROSEMIDE 20 MG/1
20 TABLET ORAL DAILY
Status: ON HOLD | COMMUNITY

## 2024-04-01 RX ORDER — SODIUM CHLORIDE 0.9 % (FLUSH) 0.9 %
5-40 SYRINGE (ML) INJECTION PRN
Status: CANCELLED | OUTPATIENT
Start: 2024-04-01

## 2024-04-01 RX ORDER — HYDRALAZINE HYDROCHLORIDE 20 MG/ML
20 INJECTION INTRAMUSCULAR; INTRAVENOUS EVERY 6 HOURS PRN
Status: DISCONTINUED | OUTPATIENT
Start: 2024-04-01 | End: 2024-04-09 | Stop reason: HOSPADM

## 2024-04-01 RX ORDER — IPRATROPIUM BROMIDE AND ALBUTEROL SULFATE 2.5; .5 MG/3ML; MG/3ML
1 SOLUTION RESPIRATORY (INHALATION) ONCE
Status: COMPLETED | OUTPATIENT
Start: 2024-04-01 | End: 2024-04-01

## 2024-04-01 RX ORDER — POLYETHYLENE GLYCOL 3350 17 G/17G
17 POWDER, FOR SOLUTION ORAL DAILY PRN
Status: CANCELLED | OUTPATIENT
Start: 2024-04-01

## 2024-04-01 RX ORDER — ONDANSETRON 2 MG/ML
4 INJECTION INTRAMUSCULAR; INTRAVENOUS EVERY 6 HOURS PRN
Status: CANCELLED | OUTPATIENT
Start: 2024-04-01

## 2024-04-01 RX ORDER — 0.9 % SODIUM CHLORIDE 0.9 %
1000 INTRAVENOUS SOLUTION INTRAVENOUS ONCE
Status: COMPLETED | OUTPATIENT
Start: 2024-04-01 | End: 2024-04-01

## 2024-04-01 RX ORDER — ACETAMINOPHEN 325 MG/1
650 TABLET ORAL EVERY 6 HOURS PRN
Status: DISCONTINUED | OUTPATIENT
Start: 2024-04-01 | End: 2024-04-09 | Stop reason: HOSPADM

## 2024-04-01 RX ORDER — HEPARIN SODIUM 5000 [USP'U]/ML
5000 INJECTION, SOLUTION INTRAVENOUS; SUBCUTANEOUS EVERY 8 HOURS SCHEDULED
Status: DISCONTINUED | OUTPATIENT
Start: 2024-04-01 | End: 2024-04-09 | Stop reason: HOSPADM

## 2024-04-01 RX ORDER — SODIUM CHLORIDE 0.9 % (FLUSH) 0.9 %
5-40 SYRINGE (ML) INJECTION EVERY 12 HOURS SCHEDULED
Status: CANCELLED | OUTPATIENT
Start: 2024-04-01

## 2024-04-01 RX ORDER — ONDANSETRON 4 MG/1
4 TABLET, ORALLY DISINTEGRATING ORAL EVERY 8 HOURS PRN
Status: CANCELLED | OUTPATIENT
Start: 2024-04-01

## 2024-04-01 RX ADMIN — HEPARIN SODIUM 5000 UNITS: 5000 INJECTION INTRAVENOUS; SUBCUTANEOUS at 22:03

## 2024-04-01 RX ADMIN — HYDRALAZINE HYDROCHLORIDE 20 MG: 20 INJECTION INTRAMUSCULAR; INTRAVENOUS at 23:47

## 2024-04-01 RX ADMIN — HEPARIN SODIUM 5000 UNITS: 5000 INJECTION INTRAVENOUS; SUBCUTANEOUS at 14:49

## 2024-04-01 RX ADMIN — CARVEDILOL 6.25 MG: 3.12 TABLET, FILM COATED ORAL at 05:19

## 2024-04-01 RX ADMIN — HYDRALAZINE HYDROCHLORIDE 50 MG: 50 TABLET, FILM COATED ORAL at 20:11

## 2024-04-01 RX ADMIN — FUROSEMIDE 60 MG: 10 INJECTION, SOLUTION INTRAMUSCULAR; INTRAVENOUS at 04:26

## 2024-04-01 RX ADMIN — HYDRALAZINE HYDROCHLORIDE 50 MG: 50 TABLET, FILM COATED ORAL at 14:49

## 2024-04-01 RX ADMIN — ACETAMINOPHEN 650 MG: 325 TABLET ORAL at 17:25

## 2024-04-01 RX ADMIN — IPRATROPIUM BROMIDE AND ALBUTEROL SULFATE 1 DOSE: .5; 3 SOLUTION RESPIRATORY (INHALATION) at 04:11

## 2024-04-01 RX ADMIN — ACETAMINOPHEN 650 MG: 325 TABLET ORAL at 23:06

## 2024-04-01 RX ADMIN — NIFEDIPINE 30 MG: 30 TABLET, EXTENDED RELEASE ORAL at 04:45

## 2024-04-01 RX ADMIN — SODIUM CHLORIDE 1000 ML: 9 INJECTION, SOLUTION INTRAVENOUS at 03:18

## 2024-04-01 RX ADMIN — NITROGLYCERIN 0.4 MG: 0.4 TABLET, ORALLY DISINTEGRATING SUBLINGUAL at 04:25

## 2024-04-01 RX ADMIN — CARVEDILOL 6.25 MG: 3.12 TABLET, FILM COATED ORAL at 20:10

## 2024-04-01 RX ADMIN — HYDRALAZINE HYDROCHLORIDE 10 MG: 20 INJECTION INTRAMUSCULAR; INTRAVENOUS at 04:56

## 2024-04-01 ASSESSMENT — PAIN - FUNCTIONAL ASSESSMENT
PAIN_FUNCTIONAL_ASSESSMENT: NONE - DENIES PAIN
PAIN_FUNCTIONAL_ASSESSMENT: NONE - DENIES PAIN

## 2024-04-01 ASSESSMENT — PAIN DESCRIPTION - PAIN TYPE: TYPE: ACUTE PAIN

## 2024-04-01 ASSESSMENT — PAIN DESCRIPTION - LOCATION
LOCATION: LEG
LOCATION: LEG

## 2024-04-01 ASSESSMENT — PAIN DESCRIPTION - ORIENTATION
ORIENTATION: RIGHT;LEFT
ORIENTATION: RIGHT;LEFT

## 2024-04-01 ASSESSMENT — PAIN SCALES - GENERAL
PAINLEVEL_OUTOF10: 3
PAINLEVEL_OUTOF10: 3
PAINLEVEL_OUTOF10: 6
PAINLEVEL_OUTOF10: 3

## 2024-04-01 ASSESSMENT — PAIN DESCRIPTION - DESCRIPTORS
DESCRIPTORS: ACHING;DISCOMFORT
DESCRIPTORS: ACHING

## 2024-04-01 NOTE — H&P
HISTORY & PHYSICAL      Patient: Rozina Doss MRN: [id number]  [facility]: [id number]    YOB: 1945  Age: 78 y.o.  Sex: female    DOA: 4/1/2024 LOS:  LOS: 0 days        DOA: 4/1/2024        Assessment/Plan     78 years old presented with shortness of breath, weakness, congestion    -Pulmonary edema, suspect related to CKD and CHF  -Acute hypoxic respiratory failure, requiring BiPAP, ABG pending  -Hypertension, uncontrolled  -Diabetes mellitus  -Other medical problems as below    The patient admitted to ICU  Continue on BiPAP and wean off as tolerated  Diuresis with IV Lasix  Nitroglycerin paste  Echocardiogram was requested  Serial cardiac enzymes  Cardiology was consulted by ED  Monitor intake, output, and trend renal function  Nephrology consult  Continue antihypertensives, and adjust as needed for blood pressure control  Continue insulin and monitor Accu-Cheks  Continue other home meds    Admission plan was discussed      Patient Active Problem List   Diagnosis    Essential hypertension    Epistaxis    ARIC (acute kidney injury) (HCC)    Multiple falls    Unable to ambulate    Type 2 diabetes mellitus (HCC)    Tobacco use    Acute anemia    CKD stage 3 due to type 2 diabetes mellitus (HCC)    Rhabdomyolysis    Pulmonary edema with left heart failure (HCC)     History of Presenting Illness:  78 years old who is admitted for pulmonary edema and hypoxic respiratory failure.  She went to the ED with complaint of having weakness, cough, congestion resembling URI symptoms and was given 500 cc of normal saline when she got more short of breath, hypoxic and required BiPAP.  He was then given IV furosemide.  He has medical history significant for CKD stage IV, hypertension, and diabetes.  Chest x-ray in the ED showed pulmonary edema.  Cardiology was consulted by ED, and nephrology was paged.  She is admitted to the ICU for further management of pulmonary edema, likely due to CHF and CKD.  She has no other

## 2024-04-01 NOTE — CONSULTS
[facility]  Pulmonary, Critical Care, and Sleep Medicine    Name: Rozina Doss MRN: [id number]   : 1945 Hospital: [facility]    Date: 2024  Room: [address] [place] [address] Note                                              Consult requesting physician: Dr. Leslie  Reason for Consult: Assisting in ICU care for Acute hypoxic respiratory failure     IMPRESSION:     Acute respiratory failure with hypoxia  Pulmonary edema with left heart failure  Elevated hypertension  ARIC on CKD  Hyponatremia  Anemia  Active Hospital Problems    Diagnosis Date Noted    Pulmonary edema with left heart failure (HCC) [I50.1] 2024    Acute respiratory failure with hypoxia (HCC) [J96.01] 2024     Code status: Prior      RECOMMENDATIONS:     Respiratory: Presented with increased work of breathing requiring BiPAP support for acute hypoxic respiratory failure and chest x-ray suggesting pulmonary edema in the background of elevated blood pressure, proBNP. Protecting airway.   Improved respiratory status and came off of BiPAP after ABG on BiPAP this a.m.  ABG 2024 on BiPAP reviewed  Continue O2 via NC and may use BiPAP as needed during daytime and at night  Follow-up chest x-ray in a.m.  Keep SPO2 >=92%. HOB 30 degree elevation all the time. Aggressive pulmonary toileting. Aspiration precautions, pulmonary hygiene care.  Bronchodilators: DuoNeb as needed  In the background of longstanding chronic smoking, she would benefit from outpatient PFT.  She agrees  Former smoker 1 pack/day since mid teen years, quit late     CVS: Improved blood pressure control  On Coreg, hydralazine, nifedipine  Judicious IV fluid, avoid if possible   Normal troponin, normal EKG on admission  Check ECHO, vascular duplex bilateral lower extremities-venous and arterial  Diuresis: Further diuresis as per nephrology  Cardiology consulted    ID: No leukocytosis or fever  Chest x-ray changes likely from

## 2024-04-01 NOTE — ED NOTES
Patient states on call bell that she is having trouble breathing. Patient respirations labored upon entering the room. Audible crackles. MD at bedside.

## 2024-04-01 NOTE — ED PROVIDER NOTES
intact  Cardiovascular: RRR, Warm, well-perfused extremities  Respiratory: CTAB, Unlabored respiratory effort  Abdominal: Soft, nontender, nondistended, no CVA tenderness  Musculoskeletal: Full range of motion all extremities. No deformities. No peripheral edema.  Skin: Warm, dry. No rashes  Vascular: Pulses equal in all 4 extremities.  Neuro: CNs II-XII grossly intact. Sensation and motor function intact.  Psych: Appropriate mood and affect.    Diagnostic Study Results     LABS:  Recent Results (from the past 24 hour(s))   Urinalysis    Collection Time: 04/01/24 12:05 AM   Result Value Ref Range    Color, UA YELLOW      Appearance CLEAR      Specific Gravity, UA 1.016 1.005 - 1.030      pH, Urine 6.0 5.0 - 8.0      Protein, UA >1000 (A) NEG mg/dL    Glucose,  (A) NEG mg/dL    Ketones, Urine Negative NEG mg/dL    Bilirubin Urine Negative NEG      Blood, Urine SMALL (A) NEG      Urobilinogen, Urine 0.2 0.2 - 1.0 EU/dL    Nitrite, Urine Negative NEG      Leukocyte Esterase, Urine Negative NEG     Urinalysis, Micro    Collection Time: 04/01/24 12:05 AM   Result Value Ref Range    WBC, UA 0 to 3 0 - 5 /hpf    RBC, UA 0 to 3 0 - 5 /hpf    Epithelial Cells UA 2+ 0 - 5 /lpf    BACTERIA, URINE 2+ (A) NEG /hpf    Amorphous Crystal 1+ (A) NEG   CBC with Auto Differential    Collection Time: 04/01/24  1:25 AM   Result Value Ref Range    WBC 5.9 4.6 - 13.2 K/uL    RBC 2.76 (L) 4.20 - 5.30 M/uL    Hemoglobin 9.1 (L) 12.0 - 16.0 g/dL    Hematocrit 26.6 (L) 35.0 - 45.0 %    MCV 96.4 78.0 - 100.0 FL    MCH 33.0 24.0 - 34.0 PG    MCHC 34.2 31.0 - 37.0 g/dL    RDW 12.4 11.6 - 14.5 %    Platelets 214 135 - 420 K/uL    MPV 10.2 9.2 - 11.8 FL    Nucleated RBCs 0.0 0  WBC    nRBC 0.00 0.00 - 0.01 K/uL    Neutrophils % 89 (H) 40 - 73 %    Lymphocytes % 4 (L) 21 - 52 %    Monocytes % 6 3 - 10 %    Eosinophils % 0 0 - 5 %    Basophils % 1 0 - 2 %    Immature Granulocytes 1 (H) 0.0 - 0.5 %    Neutrophils Absolute 5.2 1.8 - 8.0

## 2024-04-01 NOTE — ED TRIAGE NOTES
Pt arrived from home via EMS. Pt woke up feeling like her legs are locked up. Unable to walk. Describes feeling in legs as numbness. Pt has a hx of hypertension, CKD, and diabetes.

## 2024-04-01 NOTE — CONSULTS
[facility] Consult Note      Patient: Rozina Doss MRN: [id number]  SSN: [id number]    YOB: 1945  Age: 78 y.o.  Sex: female    Date of Consultation: 4/1/2024    Reason for Consultation: Pulmonary edema    HPI:  I was asked by Dr. Warren to see this pleasant patient for pulmonary edema, CHF.  Rozina Butler is a 78-year-old pleasant patient with longstanding history of hypertension, chronic kidney disease, obesity history of smoking intermittently basically came to the hospital with main symptoms of weakness in the lower extremities unable to ambulate and also some associated shortness of breath patient was given a bolus of fluid in the ER patient developed acute pulmonary edema and severe hypertension requiring IV diuresis and BiPAP treatment.  Patient have no known history of sleep apnea.  History of arthritis and intermittent lower extremity swelling patient have history of chronic kidney disease follows with nephrologist.  Patient have good urine output after IV diuretics.                 Past Medical History:   Diagnosis Date    Diabetes (HCC)     Hypertension     Tobacco use 4/8/2023    Type 2 diabetes mellitus (HCC) 4/8/2023     No past surgical history on file.  Current Facility-Administered Medications   Medication Dose Route Frequency    hydrALAZINE (APRESOLINE) tablet 50 mg  50 mg Oral 3 times per day    NIFEdipine (PROCARDIA XL) extended release tablet 30 mg  30 mg Oral Daily    carvedilol (COREG) tablet 6.25 mg  6.25 mg Oral BID    insulin lispro (HUMALOG) injection vial 0-4 Units  0-4 Units SubCUTAneous TID WC    insulin lispro (HUMALOG) injection vial 0-4 Units  0-4 Units SubCUTAneous Nightly    ipratropium 0.5 mg-albuterol 2.5 mg (DUONEB) nebulizer solution 1 Dose  1 Dose Inhalation Q4H PRN    heparin (porcine) injection 5,000 Units  5,000 Units SubCUTAneous 3 times per day    [START ON 4/2/2024] furosemide (LASIX) tablet 40 mg  40 mg Oral Daily    acetaminophen (TYLENOL)

## 2024-04-01 NOTE — SUBJECTIVE & OBJECTIVE
Subjective:     ***    Objective:     Vitals:    04/01/24 0425 04/01/24 0446 04/01/24 0449 04/01/24 0516   BP:  (!) 212/84  (!) 183/70   Pulse: (!) 121 (!) 116 (!) 116 (!) 111   Resp:  25 (!) 33    Temp:       TempSrc:       SpO2:  98% 98%    Weight:       Height:            Intake andOutput:  Current Shift: No intake/output data recorded.  Last three shifts: No intake/output data recorded.     Physical Exam      Medications:  Current Facility-Administered Medications   Medication Dose Route Frequency   • hydrALAZINE (APRESOLINE) tablet 50 mg  50 mg Oral 3 times per day   • NIFEdipine (PROCARDIA XL) extended release tablet 30 mg  30 mg Oral Daily   • carvedilol (COREG) tablet 6.25 mg  6.25 mg Oral BID   • insulin lispro (HUMALOG) injection vial 0-4 Units  0-4 Units SubCUTAneous TID WC   • insulin lispro (HUMALOG) injection vial 0-4 Units  0-4 Units SubCUTAneous Nightly     Current Outpatient Medications   Medication Sig   • hydrALAZINE (APRESOLINE) 50 MG tablet Take 1 tablet by mouth every 8 hours   • insulin glargine (LANTUS) 100 UNIT/ML injection vial Inject 12 Units into the skin daily   • NIFEdipine (PROCARDIA XL) 30 MG extended release tablet Take 1 tablet by mouth daily   • atenolol (TENORMIN) 50 MG tablet Take 1 tablet by mouth daily   • doxepin (SINEQUAN) 100 MG capsule Take 1 capsule by mouth nightly        Medications Reviewed    :

## 2024-04-01 NOTE — CONSULTS
RENAL CONSULT  2024    Patient:  Rozina Doss  :  1945  Gender:  female  MRN #:  [id number]    Reason for Consult: CKD management and volume overload     History of Present Illness:    Rozina Doss is a 78 y.o. year old female  with advanced CKD stage G4, hypertension , past smoking presented with   Weakness, b/l lower leg pain , cough and congestion     She was given 500 ml NS in ED then become hypoxic/short of breath required lasix and BIPAP   When I saw her this morning she reports pain in lower leg . Still has congestion and shortness of breath .         Past Medical History:   Diagnosis Date    Diabetes (HCC)     Hypertension     Tobacco use 2023    Type 2 diabetes mellitus (HCC) 2023     No past surgical history on file.  Family History   Problem Relation Age of Onset    Brain Cancer Mother     Depression Father     Esophageal Cancer Father     Coronary Art Dis Father     Alcohol Abuse Father     Hypertension Father      Allergies   Allergen Reactions    Haldol [Haloperidol] Hallucinations    Codeine Other (See Comments)    Sulfa Antibiotics Other (See Comments)     Current Facility-Administered Medications   Medication Dose Route Frequency Provider Last Rate Last Admin    hydrALAZINE (APRESOLINE) tablet 50 mg  50 mg Oral 3 times per day Dony Warren MD        NIFEdipine (PROCARDIA XL) extended release tablet 30 mg  30 mg Oral Daily Dony Warren MD   30 mg at 24 0445    carvedilol (COREG) tablet 6.25 mg  6.25 mg Oral BID Dony Warren MD   6.25 mg at 24 0519    insulin lispro (HUMALOG) injection vial 0-4 Units  0-4 Units SubCUTAneous TID  Hemant Magana MD        insulin lispro (HUMALOG) injection vial 0-4 Units  0-4 Units SubCUTAneous Nightly Hemant Magana MD        ipratropium 0.5 mg-albuterol 2.5 mg (DUONEB) nebulizer solution 1 Dose  1 Dose Inhalation Q4H PRN Ubaldo Finley MD        heparin (porcine) injection

## 2024-04-01 NOTE — ACP (ADVANCE CARE PLANNING)
Advance Care Planning     Advance Care Planning Inpatient Note  [facility] Department    Today's Date: 4/1/2024  Unit: [facility] 1 INTENSIVE CARE    Received request from rounding.  Upon review of chart and communication with care team, patient's decision making abilities are not in question.. Patient and Child/Children were present in the room during visit.    Goals of ACP Conversation:  Discuss advance care planning documents    Health Care Decision Makers:       Primary Decision Maker: Hyacinth Schaeffer - Child - [phone]    Secondary Decision Maker: Jose Doss Jr - Child - [phone]    Secondary Decision Maker: Kathy Werner - Grandchild - [phone]    Summary:  Verified Documents    Advance Care Planning Documents (Patient Wishes):  Healthcare Power of /Advance Directive Appointment of Health Care Agent  Living Will/Advance Directive  Anatomical Gift/Organ Donation     Assessment:  Patient completed an AMD yesterday and it was scanned in today. She chose her daughter and then son and granddaughter.  Patient had two daughters and a granddaughter at bedside during visit.  Patient is [employer].     Interventions:  Requested patient/family to submit existing document for our records: Healthcare Power of /Advance Directive Appointment of Health Care Agent  Living Will/Advance Directive  Anatomical Gift/Organ Donation    Care Preferences Communicated:   No    Outcomes/Plan:  ACP Discussion: Completed  Existing advance directive reviewed with patient; no changes to patient's previously recorded wishes.    Electronically signed by BAIRON WELLER on 4/1/2024 at 11:52 AM

## 2024-04-02 ENCOUNTER — APPOINTMENT (OUTPATIENT)
Facility: HOSPITAL | Age: 79
DRG: 208 | End: 2024-04-02
Payer: MEDICARE

## 2024-04-02 PROBLEM — D64.9 ANEMIA: Status: ACTIVE | Noted: 2024-04-02

## 2024-04-02 PROBLEM — E87.1 HYPONATREMIA: Status: ACTIVE | Noted: 2024-04-02

## 2024-04-02 LAB
ANION GAP SERPL CALC-SCNC: 10 MMOL/L (ref 3–18)
ARTERIAL PATENCY WRIST A: POSITIVE
BASE DEFICIT BLD-SCNC: 4.9 MMOL/L
BASOPHILS # BLD: 0 K/UL (ref 0–0.1)
BASOPHILS NFR BLD: 0 % (ref 0–2)
BDY SITE: ABNORMAL
BUN SERPL-MCNC: 64 MG/DL (ref 7–18)
BUN/CREAT SERPL: 20 (ref 12–20)
CALCIUM SERPL-MCNC: 9 MG/DL (ref 8.5–10.1)
CHLORIDE SERPL-SCNC: 101 MMOL/L (ref 100–111)
CO2 SERPL-SCNC: 21 MMOL/L (ref 21–32)
CREAT SERPL-MCNC: 3.21 MG/DL (ref 0.6–1.3)
DIFFERENTIAL METHOD BLD: ABNORMAL
EOSINOPHIL # BLD: 0 K/UL (ref 0–0.4)
EOSINOPHIL NFR BLD: 0 % (ref 0–5)
ERYTHROCYTE [DISTWIDTH] IN BLOOD BY AUTOMATED COUNT: 12.7 % (ref 11.6–14.5)
GAS FLOW.O2 O2 DELIVERY SYS: ABNORMAL
GLUCOSE BLD STRIP.AUTO-MCNC: 134 MG/DL (ref 70–110)
GLUCOSE BLD STRIP.AUTO-MCNC: 134 MG/DL (ref 70–110)
GLUCOSE BLD STRIP.AUTO-MCNC: 159 MG/DL (ref 70–110)
GLUCOSE BLD STRIP.AUTO-MCNC: 205 MG/DL (ref 70–110)
GLUCOSE BLD STRIP.AUTO-MCNC: 211 MG/DL (ref 70–110)
GLUCOSE SERPL-MCNC: 136 MG/DL (ref 74–99)
HCO3 BLD-SCNC: 19.1 MMOL/L (ref 22–26)
HCT VFR BLD AUTO: 26.1 % (ref 35–45)
HGB BLD-MCNC: 9 G/DL (ref 12–16)
IMM GRANULOCYTES # BLD AUTO: 0 K/UL (ref 0–0.04)
IMM GRANULOCYTES NFR BLD AUTO: 1 % (ref 0–0.5)
LYMPHOCYTES # BLD: 0.5 K/UL (ref 0.9–3.6)
LYMPHOCYTES NFR BLD: 9 % (ref 21–52)
MAGNESIUM SERPL-MCNC: 2.1 MG/DL (ref 1.6–2.6)
MCH RBC QN AUTO: 33.6 PG (ref 24–34)
MCHC RBC AUTO-ENTMCNC: 34.5 G/DL (ref 31–37)
MCV RBC AUTO: 97.4 FL (ref 78–100)
MONOCYTES # BLD: 0.3 K/UL (ref 0.05–1.2)
MONOCYTES NFR BLD: 5 % (ref 3–10)
NEUTS SEG # BLD: 5 K/UL (ref 1.8–8)
NEUTS SEG NFR BLD: 86 % (ref 40–73)
NRBC # BLD: 0 K/UL (ref 0–0.01)
NRBC BLD-RTO: 0 PER 100 WBC
O2/TOTAL GAS SETTING VFR VENT: 1 %
PCO2 BLD: 30.1 MMHG (ref 35–45)
PH BLD: 7.41 (ref 7.35–7.45)
PHOSPHATE SERPL-MCNC: 4.5 MG/DL (ref 2.5–4.9)
PLATELET # BLD AUTO: 191 K/UL (ref 135–420)
PMV BLD AUTO: 10.1 FL (ref 9.2–11.8)
PO2 BLD: 64 MMHG (ref 80–100)
POTASSIUM SERPL-SCNC: 4.3 MMOL/L (ref 3.5–5.5)
RBC # BLD AUTO: 2.68 M/UL (ref 4.2–5.3)
SAO2 % BLD: 92.6 % (ref 92–97)
SERVICE CMNT-IMP: ABNORMAL
SODIUM SERPL-SCNC: 132 MMOL/L (ref 136–145)
SPECIMEN TYPE: ABNORMAL
TSH SERPL DL<=0.05 MIU/L-ACNC: 1.43 UIU/ML (ref 0.36–3.74)
WBC # BLD AUTO: 5.9 K/UL (ref 4.6–13.2)

## 2024-04-02 PROCEDURE — 84443 ASSAY THYROID STIM HORMONE: CPT

## 2024-04-02 PROCEDURE — 82962 GLUCOSE BLOOD TEST: CPT

## 2024-04-02 PROCEDURE — 6360000002 HC RX W HCPCS: Performed by: INTERNAL MEDICINE

## 2024-04-02 PROCEDURE — 6370000000 HC RX 637 (ALT 250 FOR IP): Performed by: INTERNAL MEDICINE

## 2024-04-02 PROCEDURE — 6370000000 HC RX 637 (ALT 250 FOR IP): Performed by: STUDENT IN AN ORGANIZED HEALTH CARE EDUCATION/TRAINING PROGRAM

## 2024-04-02 PROCEDURE — 6370000000 HC RX 637 (ALT 250 FOR IP): Performed by: EMERGENCY MEDICINE

## 2024-04-02 PROCEDURE — 83735 ASSAY OF MAGNESIUM: CPT

## 2024-04-02 PROCEDURE — 84100 ASSAY OF PHOSPHORUS: CPT

## 2024-04-02 PROCEDURE — 94660 CPAP INITIATION&MGMT: CPT

## 2024-04-02 PROCEDURE — 51702 INSERT TEMP BLADDER CATH: CPT

## 2024-04-02 PROCEDURE — 2580000003 HC RX 258: Performed by: INTERNAL MEDICINE

## 2024-04-02 PROCEDURE — 80048 BASIC METABOLIC PNL TOTAL CA: CPT

## 2024-04-02 PROCEDURE — 83540 ASSAY OF IRON: CPT

## 2024-04-02 PROCEDURE — 87086 URINE CULTURE/COLONY COUNT: CPT

## 2024-04-02 PROCEDURE — 71045 X-RAY EXAM CHEST 1 VIEW: CPT

## 2024-04-02 PROCEDURE — 36600 WITHDRAWAL OF ARTERIAL BLOOD: CPT

## 2024-04-02 PROCEDURE — 82803 BLOOD GASES ANY COMBINATION: CPT

## 2024-04-02 PROCEDURE — 82728 ASSAY OF FERRITIN: CPT

## 2024-04-02 PROCEDURE — 2000000000 HC ICU R&B

## 2024-04-02 PROCEDURE — 85025 COMPLETE CBC W/AUTO DIFF WBC: CPT

## 2024-04-02 PROCEDURE — 83550 IRON BINDING TEST: CPT

## 2024-04-02 RX ORDER — MORPHINE SULFATE 2 MG/ML
1 INJECTION, SOLUTION INTRAMUSCULAR; INTRAVENOUS
Status: COMPLETED | OUTPATIENT
Start: 2024-04-02 | End: 2024-04-02

## 2024-04-02 RX ORDER — OXYCODONE HYDROCHLORIDE AND ACETAMINOPHEN 5; 325 MG/1; MG/1
1 TABLET ORAL EVERY 8 HOURS PRN
Status: DISCONTINUED | OUTPATIENT
Start: 2024-04-02 | End: 2024-04-07

## 2024-04-02 RX ORDER — GUAIFENESIN 600 MG/1
600 TABLET, EXTENDED RELEASE ORAL 2 TIMES DAILY
Status: DISCONTINUED | OUTPATIENT
Start: 2024-04-02 | End: 2024-04-09

## 2024-04-02 RX ADMIN — FUROSEMIDE 40 MG: 40 TABLET ORAL at 08:47

## 2024-04-02 RX ADMIN — CEFTRIAXONE 1000 MG: 1 INJECTION, POWDER, FOR SOLUTION INTRAMUSCULAR; INTRAVENOUS at 11:54

## 2024-04-02 RX ADMIN — INSULIN LISPRO 1 UNITS: 100 INJECTION, SOLUTION INTRAVENOUS; SUBCUTANEOUS at 11:54

## 2024-04-02 RX ADMIN — HYDRALAZINE HYDROCHLORIDE 50 MG: 50 TABLET, FILM COATED ORAL at 21:12

## 2024-04-02 RX ADMIN — NIFEDIPINE 30 MG: 30 TABLET, EXTENDED RELEASE ORAL at 08:47

## 2024-04-02 RX ADMIN — ACETAMINOPHEN 650 MG: 325 TABLET ORAL at 11:55

## 2024-04-02 RX ADMIN — MORPHINE SULFATE 1 MG: 2 INJECTION, SOLUTION INTRAMUSCULAR; INTRAVENOUS at 02:22

## 2024-04-02 RX ADMIN — CARVEDILOL 6.25 MG: 3.12 TABLET, FILM COATED ORAL at 08:47

## 2024-04-02 RX ADMIN — GUAIFENESIN 600 MG: 600 TABLET, EXTENDED RELEASE ORAL at 22:59

## 2024-04-02 RX ADMIN — HYDRALAZINE HYDROCHLORIDE 50 MG: 50 TABLET, FILM COATED ORAL at 05:02

## 2024-04-02 RX ADMIN — ACETAMINOPHEN 650 MG: 325 TABLET ORAL at 05:02

## 2024-04-02 RX ADMIN — HEPARIN SODIUM 5000 UNITS: 5000 INJECTION INTRAVENOUS; SUBCUTANEOUS at 05:38

## 2024-04-02 RX ADMIN — CARVEDILOL 6.25 MG: 3.12 TABLET, FILM COATED ORAL at 21:12

## 2024-04-02 RX ADMIN — HEPARIN SODIUM 5000 UNITS: 5000 INJECTION INTRAVENOUS; SUBCUTANEOUS at 16:30

## 2024-04-02 RX ADMIN — ACETAMINOPHEN 650 MG: 325 TABLET ORAL at 22:59

## 2024-04-02 RX ADMIN — HEPARIN SODIUM 5000 UNITS: 5000 INJECTION INTRAVENOUS; SUBCUTANEOUS at 21:13

## 2024-04-02 RX ADMIN — HYDRALAZINE HYDROCHLORIDE 50 MG: 50 TABLET, FILM COATED ORAL at 16:30

## 2024-04-02 ASSESSMENT — PAIN SCALES - GENERAL
PAINLEVEL_OUTOF10: 4
PAINLEVEL_OUTOF10: 8
PAINLEVEL_OUTOF10: 10
PAINLEVEL_OUTOF10: 6
PAINLEVEL_OUTOF10: 1
PAINLEVEL_OUTOF10: 8
PAINLEVEL_OUTOF10: 0
PAINLEVEL_OUTOF10: 2

## 2024-04-02 ASSESSMENT — PAIN DESCRIPTION - ORIENTATION
ORIENTATION: LEFT;RIGHT
ORIENTATION: RIGHT;LEFT
ORIENTATION: ANTERIOR
ORIENTATION: RIGHT;LEFT

## 2024-04-02 ASSESSMENT — PAIN DESCRIPTION - DESCRIPTORS
DESCRIPTORS: THROBBING
DESCRIPTORS: ACHING
DESCRIPTORS: SHARP

## 2024-04-02 ASSESSMENT — PAIN DESCRIPTION - FREQUENCY: FREQUENCY: INTERMITTENT

## 2024-04-02 ASSESSMENT — PAIN DESCRIPTION - LOCATION
LOCATION: HIP

## 2024-04-02 ASSESSMENT — PAIN DESCRIPTION - ONSET: ONSET: OTHER (COMMENT)

## 2024-04-02 NOTE — PLAN OF CARE
Problem: Discharge Planning  Goal: Discharge to home or other facility with appropriate resources  Outcome: Progressing  Flowsheets (Taken 4/2/2024 0800)  Discharge to home or other facility with appropriate resources:   Identify barriers to discharge with patient and caregiver   Arrange for needed discharge resources and transportation as appropriate   Identify discharge learning needs (meds, wound care, etc)     Problem: ABCDS Injury Assessment  Goal: Absence of physical injury  Outcome: Progressing  Flowsheets (Taken 4/2/2024 0800)  Absence of Physical Injury: Implement safety measures based on patient assessment     Problem: Safety - Adult  Goal: Free from fall injury  Outcome: Progressing  Flowsheets (Taken 4/2/2024 0800)  Free From Fall Injury: Instruct family/caregiver on patient safety     Problem: Pain  Goal: Verbalizes/displays adequate comfort level or baseline comfort level  Outcome: Not Progressing  Flowsheets  Taken 4/2/2024 1600  Verbalizes/displays adequate comfort level or baseline comfort level:   Assess pain using appropriate pain scale   Administer analgesics based on type and severity of pain and evaluate response   Implement non-pharmacological measures as appropriate and evaluate response   Encourage patient to monitor pain and request assistance  Taken 4/2/2024 1155  Verbalizes/displays adequate comfort level or baseline comfort level:   Assess pain using appropriate pain scale   Encourage patient to monitor pain and request assistance   Administer analgesics based on type and severity of pain and evaluate response   Implement non-pharmacological measures as appropriate and evaluate response  Taken 4/2/2024 0800  Verbalizes/displays adequate comfort level or baseline comfort level:   Encourage patient to monitor pain and request assistance   Assess pain using appropriate pain scale   Administer analgesics based on type and severity of pain and evaluate response

## 2024-04-02 NOTE — PLAN OF CARE
Problem: Pain  Goal: Verbalizes/displays adequate comfort level or baseline comfort level  4/1/2024 2227 by Lili Ivey, RN  Outcome: Progressing  4/1/2024 0959 by Summer Sanches, RN  Outcome: Progressing     Problem: Skin/Tissue Integrity  Goal: Absence of new skin breakdown  Description: 1.  Monitor for areas of redness and/or skin breakdown  2.  Assess vascular access sites hourly  3.  Every 4-6 hours minimum:  Change oxygen saturation probe site  4.  Every 4-6 hours:  If on nasal continuous positive airway pressure, respiratory therapy assess nares and determine need for appliance change or resting period.  Outcome: Progressing     Problem: Safety - Adult  Goal: Free from fall injury  Outcome: Progressing  Flowsheets (Taken 4/1/2024 1042 by Summer Sanches, RN)  Free From Fall Injury:   Instruct family/caregiver on patient safety   Based on caregiver fall risk screen, instruct family/caregiver to ask for assistance with transferring infant if caregiver noted to have fall risk factors

## 2024-04-03 ENCOUNTER — HOSPITAL ENCOUNTER (INPATIENT)
Facility: HOSPITAL | Age: 79
Discharge: HOME | DRG: 208 | End: 2024-04-05
Payer: MEDICARE

## 2024-04-03 ENCOUNTER — APPOINTMENT (OUTPATIENT)
Facility: HOSPITAL | Age: 79
DRG: 208 | End: 2024-04-03
Payer: MEDICARE

## 2024-04-03 LAB
ANION GAP SERPL CALC-SCNC: 10 MMOL/L (ref 3–18)
BACTERIA SPEC CULT: NORMAL
BASOPHILS # BLD: 0 K/UL (ref 0–0.1)
BASOPHILS NFR BLD: 0 % (ref 0–2)
BUN SERPL-MCNC: 72 MG/DL (ref 7–18)
BUN/CREAT SERPL: 21 (ref 12–20)
CALCIUM SERPL-MCNC: 8.3 MG/DL (ref 8.5–10.1)
CHLORIDE SERPL-SCNC: 99 MMOL/L (ref 100–111)
CO2 SERPL-SCNC: 20 MMOL/L (ref 21–32)
CREAT SERPL-MCNC: 3.43 MG/DL (ref 0.6–1.3)
DIFFERENTIAL METHOD BLD: ABNORMAL
ECHO BSA: 1.76 M2
EOSINOPHIL # BLD: 0 K/UL (ref 0–0.4)
EOSINOPHIL NFR BLD: 0 % (ref 0–5)
ERYTHROCYTE [DISTWIDTH] IN BLOOD BY AUTOMATED COUNT: 12.8 % (ref 11.6–14.5)
FERRITIN SERPL-MCNC: 307 NG/ML (ref 8–388)
GLUCOSE BLD STRIP.AUTO-MCNC: 139 MG/DL (ref 70–110)
GLUCOSE BLD STRIP.AUTO-MCNC: 146 MG/DL (ref 70–110)
GLUCOSE BLD STRIP.AUTO-MCNC: 149 MG/DL (ref 70–110)
GLUCOSE BLD STRIP.AUTO-MCNC: 316 MG/DL (ref 70–110)
GLUCOSE SERPL-MCNC: 128 MG/DL (ref 74–99)
HCT VFR BLD AUTO: 24.8 % (ref 35–45)
HGB BLD-MCNC: 8.6 G/DL (ref 12–16)
IMM GRANULOCYTES # BLD AUTO: 0 K/UL (ref 0–0.04)
IMM GRANULOCYTES NFR BLD AUTO: 0 % (ref 0–0.5)
IRON SATN MFR SERPL: 6 % (ref 20–50)
IRON SERPL-MCNC: 13 UG/DL (ref 50–175)
LYMPHOCYTES # BLD: 0.5 K/UL (ref 0.9–3.6)
LYMPHOCYTES NFR BLD: 12 % (ref 21–52)
MAGNESIUM SERPL-MCNC: 2 MG/DL (ref 1.6–2.6)
MCH RBC QN AUTO: 32.8 PG (ref 24–34)
MCHC RBC AUTO-ENTMCNC: 34.7 G/DL (ref 31–37)
MCV RBC AUTO: 94.7 FL (ref 78–100)
MONOCYTES # BLD: 0.3 K/UL (ref 0.05–1.2)
MONOCYTES NFR BLD: 8 % (ref 3–10)
NEUTS SEG # BLD: 2.9 K/UL (ref 1.8–8)
NEUTS SEG NFR BLD: 79 % (ref 40–73)
NRBC # BLD: 0 K/UL (ref 0–0.01)
NRBC BLD-RTO: 0 PER 100 WBC
NUC STRESS EJECTION FRACTION: 53 %
PHOSPHATE SERPL-MCNC: 4.9 MG/DL (ref 2.5–4.9)
PLATELET # BLD AUTO: 194 K/UL (ref 135–420)
PMV BLD AUTO: 10.4 FL (ref 9.2–11.8)
POTASSIUM SERPL-SCNC: 3.6 MMOL/L (ref 3.5–5.5)
RBC # BLD AUTO: 2.62 M/UL (ref 4.2–5.3)
SERVICE CMNT-IMP: NORMAL
SODIUM SERPL-SCNC: 129 MMOL/L (ref 136–145)
STRESS TARGET HR: 143 BPM
TIBC SERPL-MCNC: 224 UG/DL (ref 250–450)
WBC # BLD AUTO: 3.7 K/UL (ref 4.6–13.2)

## 2024-04-03 PROCEDURE — 2700000000 HC OXYGEN THERAPY PER DAY

## 2024-04-03 PROCEDURE — A9500 TC99M SESTAMIBI: HCPCS | Performed by: INTERNAL MEDICINE

## 2024-04-03 PROCEDURE — 6360000002 HC RX W HCPCS: Performed by: INTERNAL MEDICINE

## 2024-04-03 PROCEDURE — 80048 BASIC METABOLIC PNL TOTAL CA: CPT

## 2024-04-03 PROCEDURE — 70450 CT HEAD/BRAIN W/O DYE: CPT

## 2024-04-03 PROCEDURE — 72195 MRI PELVIS W/O DYE: CPT

## 2024-04-03 PROCEDURE — 6370000000 HC RX 637 (ALT 250 FOR IP): Performed by: INTERNAL MEDICINE

## 2024-04-03 PROCEDURE — 51702 INSERT TEMP BLADDER CATH: CPT

## 2024-04-03 PROCEDURE — 3430000000 HC RX DIAGNOSTIC RADIOPHARMACEUTICAL: Performed by: INTERNAL MEDICINE

## 2024-04-03 PROCEDURE — 83735 ASSAY OF MAGNESIUM: CPT

## 2024-04-03 PROCEDURE — 2580000003 HC RX 258: Performed by: INTERNAL MEDICINE

## 2024-04-03 PROCEDURE — 78452 HT MUSCLE IMAGE SPECT MULT: CPT

## 2024-04-03 PROCEDURE — 2000000000 HC ICU R&B

## 2024-04-03 PROCEDURE — 85025 COMPLETE CBC W/AUTO DIFF WBC: CPT

## 2024-04-03 PROCEDURE — 93017 CV STRESS TEST TRACING ONLY: CPT

## 2024-04-03 PROCEDURE — 6370000000 HC RX 637 (ALT 250 FOR IP): Performed by: EMERGENCY MEDICINE

## 2024-04-03 PROCEDURE — 72148 MRI LUMBAR SPINE W/O DYE: CPT

## 2024-04-03 PROCEDURE — 82962 GLUCOSE BLOOD TEST: CPT

## 2024-04-03 PROCEDURE — 84100 ASSAY OF PHOSPHORUS: CPT

## 2024-04-03 RX ORDER — ALPRAZOLAM 0.5 MG/1
0.5 TABLET ORAL ONCE
Status: COMPLETED | OUTPATIENT
Start: 2024-04-03 | End: 2024-04-03

## 2024-04-03 RX ORDER — REGADENOSON 0.08 MG/ML
0.4 INJECTION, SOLUTION INTRAVENOUS
Status: COMPLETED | OUTPATIENT
Start: 2024-04-03 | End: 2024-04-03

## 2024-04-03 RX ORDER — TETRAKIS(2-METHOXYISOBUTYLISOCYANIDE)COPPER(I) TETRAFLUOROBORATE 1 MG/ML
11.8 INJECTION, POWDER, LYOPHILIZED, FOR SOLUTION INTRAVENOUS
Status: COMPLETED | OUTPATIENT
Start: 2024-04-03 | End: 2024-04-03

## 2024-04-03 RX ORDER — LIDOCAINE 4 G/G
1 PATCH TOPICAL DAILY
Status: DISCONTINUED | OUTPATIENT
Start: 2024-04-03 | End: 2024-04-09 | Stop reason: HOSPADM

## 2024-04-03 RX ORDER — TETRAKIS(2-METHOXYISOBUTYLISOCYANIDE)COPPER(I) TETRAFLUOROBORATE 1 MG/ML
36 INJECTION, POWDER, LYOPHILIZED, FOR SOLUTION INTRAVENOUS
Status: COMPLETED | OUTPATIENT
Start: 2024-04-03 | End: 2024-04-03

## 2024-04-03 RX ADMIN — HYDRALAZINE HYDROCHLORIDE 50 MG: 50 TABLET, FILM COATED ORAL at 07:05

## 2024-04-03 RX ADMIN — HYDRALAZINE HYDROCHLORIDE 50 MG: 50 TABLET, FILM COATED ORAL at 21:29

## 2024-04-03 RX ADMIN — CARVEDILOL 6.25 MG: 3.12 TABLET, FILM COATED ORAL at 21:29

## 2024-04-03 RX ADMIN — GUAIFENESIN 600 MG: 600 TABLET, EXTENDED RELEASE ORAL at 11:13

## 2024-04-03 RX ADMIN — NIFEDIPINE 30 MG: 30 TABLET, EXTENDED RELEASE ORAL at 08:05

## 2024-04-03 RX ADMIN — INSULIN LISPRO 4 UNITS: 100 INJECTION, SOLUTION INTRAVENOUS; SUBCUTANEOUS at 21:29

## 2024-04-03 RX ADMIN — HEPARIN SODIUM 5000 UNITS: 5000 INJECTION INTRAVENOUS; SUBCUTANEOUS at 16:21

## 2024-04-03 RX ADMIN — GUAIFENESIN 600 MG: 600 TABLET, EXTENDED RELEASE ORAL at 21:29

## 2024-04-03 RX ADMIN — REGADENOSON 0.4 MG: 0.08 INJECTION, SOLUTION INTRAVENOUS at 10:38

## 2024-04-03 RX ADMIN — ACETAMINOPHEN 650 MG: 325 TABLET ORAL at 18:42

## 2024-04-03 RX ADMIN — ALPRAZOLAM 0.5 MG: 0.5 TABLET ORAL at 13:28

## 2024-04-03 RX ADMIN — ACETAMINOPHEN 650 MG: 325 TABLET ORAL at 11:13

## 2024-04-03 RX ADMIN — HEPARIN SODIUM 5000 UNITS: 5000 INJECTION INTRAVENOUS; SUBCUTANEOUS at 07:05

## 2024-04-03 RX ADMIN — CARVEDILOL 6.25 MG: 3.12 TABLET, FILM COATED ORAL at 08:05

## 2024-04-03 RX ADMIN — TETRAKIS(2-METHOXYISOBUTYLISOCYANIDE)COPPER(I) TETRAFLUOROBORATE 11.8 MILLICURIE: 1 INJECTION, POWDER, LYOPHILIZED, FOR SOLUTION INTRAVENOUS at 08:45

## 2024-04-03 RX ADMIN — HEPARIN SODIUM 5000 UNITS: 5000 INJECTION INTRAVENOUS; SUBCUTANEOUS at 21:29

## 2024-04-03 RX ADMIN — CEFTRIAXONE 1000 MG: 1 INJECTION, POWDER, FOR SOLUTION INTRAMUSCULAR; INTRAVENOUS at 11:25

## 2024-04-03 RX ADMIN — TETRAKIS(2-METHOXYISOBUTYLISOCYANIDE)COPPER(I) TETRAFLUOROBORATE 36 MILLICURIE: 1 INJECTION, POWDER, LYOPHILIZED, FOR SOLUTION INTRAVENOUS at 10:38

## 2024-04-03 ASSESSMENT — PAIN SCALES - GENERAL
PAINLEVEL_OUTOF10: 8
PAINLEVEL_OUTOF10: 8
PAINLEVEL_OUTOF10: 5
PAINLEVEL_OUTOF10: 10
PAINLEVEL_OUTOF10: 8

## 2024-04-03 ASSESSMENT — PAIN DESCRIPTION - DESCRIPTORS
DESCRIPTORS: STABBING;THROBBING
DESCRIPTORS: THROBBING
DESCRIPTORS: ACHING
DESCRIPTORS: ACHING

## 2024-04-03 ASSESSMENT — PAIN DESCRIPTION - LOCATION
LOCATION: HIP
LOCATION: PENIS;HIP
LOCATION: BACK
LOCATION: HIP

## 2024-04-03 ASSESSMENT — PAIN DESCRIPTION - ORIENTATION
ORIENTATION: ANTERIOR
ORIENTATION: RIGHT;LEFT
ORIENTATION: ANTERIOR;POSTERIOR

## 2024-04-03 NOTE — PLAN OF CARE
Problem: Discharge Planning  Goal: Discharge to home or other facility with appropriate resources  4/2/2024 1705 by Yokasta Birch, RN  Outcome: Progressing  Flowsheets (Taken 4/2/2024 0800)  Discharge to home or other facility with appropriate resources:   Identify barriers to discharge with patient and caregiver   Arrange for needed discharge resources and transportation as appropriate   Identify discharge learning needs (meds, wound care, etc)     Problem: Pain  Goal: Verbalizes/displays adequate comfort level or baseline comfort level  4/3/2024 0120 by Sameer Zavala, RN  Outcome: Progressing  Flowsheets (Taken 4/3/2024 0120)  Verbalizes/displays adequate comfort level or baseline comfort level:   Assess pain using appropriate pain scale   Administer analgesics based on type and severity of pain and evaluate response  4/2/2024 1705 by Yokasta Birch, RN  Outcome: Not Progressing  Flowsheets  Taken 4/2/2024 1600  Verbalizes/displays adequate comfort level or baseline comfort level:   Assess pain using appropriate pain scale   Administer analgesics based on type and severity of pain and evaluate response   Implement non-pharmacological measures as appropriate and evaluate response   Encourage patient to monitor pain and request assistance  Taken 4/2/2024 1155  Verbalizes/displays adequate comfort level or baseline comfort level:   Assess pain using appropriate pain scale   Encourage patient to monitor pain and request assistance   Administer analgesics based on type and severity of pain and evaluate response   Implement non-pharmacological measures as appropriate and evaluate response  Taken 4/2/2024 0800  Verbalizes/displays adequate comfort level or baseline comfort level:   Encourage patient to monitor pain and request assistance   Assess pain using appropriate pain scale   Administer analgesics based on type and severity of pain and evaluate response     Problem: ABCDS Injury

## 2024-04-04 LAB
AMMONIA PLAS-SCNC: <10 UMOL/L (ref 11–32)
ANION GAP SERPL CALC-SCNC: 9 MMOL/L (ref 3–18)
BASOPHILS # BLD: 0 K/UL (ref 0–0.1)
BASOPHILS NFR BLD: 0 % (ref 0–2)
BUN SERPL-MCNC: 80 MG/DL (ref 7–18)
BUN/CREAT SERPL: 21 (ref 12–20)
CALCIUM SERPL-MCNC: 8.2 MG/DL (ref 8.5–10.1)
CHLORIDE SERPL-SCNC: 101 MMOL/L (ref 100–111)
CO2 SERPL-SCNC: 21 MMOL/L (ref 21–32)
CREAT SERPL-MCNC: 3.78 MG/DL (ref 0.6–1.3)
DIFFERENTIAL METHOD BLD: ABNORMAL
EOSINOPHIL # BLD: 0 K/UL (ref 0–0.4)
EOSINOPHIL NFR BLD: 0 % (ref 0–5)
ERYTHROCYTE [DISTWIDTH] IN BLOOD BY AUTOMATED COUNT: 12.4 % (ref 11.6–14.5)
GLUCOSE BLD STRIP.AUTO-MCNC: 134 MG/DL (ref 70–110)
GLUCOSE BLD STRIP.AUTO-MCNC: 182 MG/DL (ref 70–110)
GLUCOSE BLD STRIP.AUTO-MCNC: 187 MG/DL (ref 70–110)
GLUCOSE BLD STRIP.AUTO-MCNC: 194 MG/DL (ref 70–110)
GLUCOSE SERPL-MCNC: 110 MG/DL (ref 74–99)
HCT VFR BLD AUTO: 26.2 % (ref 35–45)
HGB BLD-MCNC: 9.1 G/DL (ref 12–16)
IMM GRANULOCYTES # BLD AUTO: 0 K/UL (ref 0–0.04)
IMM GRANULOCYTES NFR BLD AUTO: 0 % (ref 0–0.5)
LYMPHOCYTES # BLD: 0.8 K/UL (ref 0.9–3.6)
LYMPHOCYTES NFR BLD: 17 % (ref 21–52)
MAGNESIUM SERPL-MCNC: 2.2 MG/DL (ref 1.6–2.6)
MCH RBC QN AUTO: 32.9 PG (ref 24–34)
MCHC RBC AUTO-ENTMCNC: 34.7 G/DL (ref 31–37)
MCV RBC AUTO: 94.6 FL (ref 78–100)
MONOCYTES # BLD: 0.3 K/UL (ref 0.05–1.2)
MONOCYTES NFR BLD: 6 % (ref 3–10)
NEUTS SEG # BLD: 3.3 K/UL (ref 1.8–8)
NEUTS SEG NFR BLD: 76 % (ref 40–73)
NRBC # BLD: 0 K/UL (ref 0–0.01)
NRBC BLD-RTO: 0 PER 100 WBC
PHOSPHATE SERPL-MCNC: 5 MG/DL (ref 2.5–4.9)
PLATELET # BLD AUTO: 198 K/UL (ref 135–420)
PMV BLD AUTO: 9.7 FL (ref 9.2–11.8)
POTASSIUM SERPL-SCNC: 4 MMOL/L (ref 3.5–5.5)
RBC # BLD AUTO: 2.77 M/UL (ref 4.2–5.3)
SODIUM SERPL-SCNC: 131 MMOL/L (ref 136–145)
WBC # BLD AUTO: 4.3 K/UL (ref 4.6–13.2)

## 2024-04-04 PROCEDURE — 2580000003 HC RX 258: Performed by: STUDENT IN AN ORGANIZED HEALTH CARE EDUCATION/TRAINING PROGRAM

## 2024-04-04 PROCEDURE — 2700000000 HC OXYGEN THERAPY PER DAY

## 2024-04-04 PROCEDURE — 80048 BASIC METABOLIC PNL TOTAL CA: CPT

## 2024-04-04 PROCEDURE — 6360000002 HC RX W HCPCS: Performed by: INTERNAL MEDICINE

## 2024-04-04 PROCEDURE — 2580000003 HC RX 258: Performed by: INTERNAL MEDICINE

## 2024-04-04 PROCEDURE — 6370000000 HC RX 637 (ALT 250 FOR IP): Performed by: INTERNAL MEDICINE

## 2024-04-04 PROCEDURE — 2000000000 HC ICU R&B

## 2024-04-04 PROCEDURE — 6370000000 HC RX 637 (ALT 250 FOR IP): Performed by: EMERGENCY MEDICINE

## 2024-04-04 PROCEDURE — 97162 PT EVAL MOD COMPLEX 30 MIN: CPT

## 2024-04-04 PROCEDURE — 84100 ASSAY OF PHOSPHORUS: CPT

## 2024-04-04 PROCEDURE — 85025 COMPLETE CBC W/AUTO DIFF WBC: CPT

## 2024-04-04 PROCEDURE — 82140 ASSAY OF AMMONIA: CPT

## 2024-04-04 PROCEDURE — 51702 INSERT TEMP BLADDER CATH: CPT

## 2024-04-04 PROCEDURE — 6360000002 HC RX W HCPCS: Performed by: STUDENT IN AN ORGANIZED HEALTH CARE EDUCATION/TRAINING PROGRAM

## 2024-04-04 PROCEDURE — 83735 ASSAY OF MAGNESIUM: CPT

## 2024-04-04 PROCEDURE — 82962 GLUCOSE BLOOD TEST: CPT

## 2024-04-04 RX ORDER — TRAMADOL HYDROCHLORIDE 50 MG/1
50 TABLET ORAL EVERY 6 HOURS PRN
Status: DISCONTINUED | OUTPATIENT
Start: 2024-04-04 | End: 2024-04-09 | Stop reason: HOSPADM

## 2024-04-04 RX ADMIN — ACETAMINOPHEN 650 MG: 325 TABLET ORAL at 08:51

## 2024-04-04 RX ADMIN — CARVEDILOL 6.25 MG: 3.12 TABLET, FILM COATED ORAL at 08:31

## 2024-04-04 RX ADMIN — GUAIFENESIN 600 MG: 600 TABLET, EXTENDED RELEASE ORAL at 08:31

## 2024-04-04 RX ADMIN — CARVEDILOL 6.25 MG: 3.12 TABLET, FILM COATED ORAL at 19:50

## 2024-04-04 RX ADMIN — GUAIFENESIN 600 MG: 600 TABLET, EXTENDED RELEASE ORAL at 20:09

## 2024-04-04 RX ADMIN — NIFEDIPINE 30 MG: 30 TABLET, EXTENDED RELEASE ORAL at 08:31

## 2024-04-04 RX ADMIN — HEPARIN SODIUM 5000 UNITS: 5000 INJECTION INTRAVENOUS; SUBCUTANEOUS at 05:50

## 2024-04-04 RX ADMIN — HYDRALAZINE HYDROCHLORIDE 50 MG: 50 TABLET, FILM COATED ORAL at 20:09

## 2024-04-04 RX ADMIN — IRON SUCROSE 200 MG: 20 INJECTION, SOLUTION INTRAVENOUS at 16:02

## 2024-04-04 RX ADMIN — ACETAMINOPHEN 650 MG: 325 TABLET ORAL at 19:50

## 2024-04-04 RX ADMIN — HEPARIN SODIUM 5000 UNITS: 5000 INJECTION INTRAVENOUS; SUBCUTANEOUS at 14:29

## 2024-04-04 RX ADMIN — HEPARIN SODIUM 5000 UNITS: 5000 INJECTION INTRAVENOUS; SUBCUTANEOUS at 21:54

## 2024-04-04 RX ADMIN — CEFTRIAXONE 1000 MG: 1 INJECTION, POWDER, FOR SOLUTION INTRAMUSCULAR; INTRAVENOUS at 11:17

## 2024-04-04 RX ADMIN — ACETAMINOPHEN 650 MG: 325 TABLET ORAL at 02:40

## 2024-04-04 RX ADMIN — HYDRALAZINE HYDROCHLORIDE 50 MG: 50 TABLET, FILM COATED ORAL at 14:29

## 2024-04-04 ASSESSMENT — PAIN DESCRIPTION - ORIENTATION
ORIENTATION: RIGHT;LEFT
ORIENTATION: RIGHT;LEFT

## 2024-04-04 ASSESSMENT — PAIN DESCRIPTION - LOCATION
LOCATION: HIP
LOCATION: HIP

## 2024-04-04 ASSESSMENT — PAIN SCALES - GENERAL
PAINLEVEL_OUTOF10: 10
PAINLEVEL_OUTOF10: 0
PAINLEVEL_OUTOF10: 10
PAINLEVEL_OUTOF10: 10

## 2024-04-04 ASSESSMENT — PAIN DESCRIPTION - FREQUENCY: FREQUENCY: CONTINUOUS

## 2024-04-04 ASSESSMENT — PAIN DESCRIPTION - DESCRIPTORS
DESCRIPTORS: ACHING
DESCRIPTORS: ACHING

## 2024-04-04 ASSESSMENT — PAIN DESCRIPTION - PAIN TYPE: TYPE: CHRONIC PAIN

## 2024-04-04 NOTE — PLAN OF CARE
Problem: Discharge Planning  Goal: Discharge to home or other facility with appropriate resources  4/4/2024 1052 by Bridget Chicas RN  Outcome: Progressing  4/4/2024 0502 by Ashlie Elizalde RN  Outcome: Progressing  Flowsheets (Taken 4/3/2024 2201)  Discharge to home or other facility with appropriate resources:   Identify barriers to discharge with patient and caregiver   Arrange for needed discharge resources and transportation as appropriate   Identify discharge learning needs (meds, wound care, etc)     Problem: Pain  Goal: Verbalizes/displays adequate comfort level or baseline comfort level  4/4/2024 1052 by Bridget Chicas RN  Outcome: Progressing  4/4/2024 0502 by Ashlie Elizalde RN  Outcome: Progressing  Flowsheets  Taken 4/3/2024 2201 by Ashlie Elizalde RN  Verbalizes/displays adequate comfort level or baseline comfort level:   Encourage patient to monitor pain and request assistance   Assess pain using appropriate pain scale   Administer analgesics based on type and severity of pain and evaluate response  Taken 4/3/2024 1600 by Yokasta Birch RN  Verbalizes/displays adequate comfort level or baseline comfort level:   Encourage patient to monitor pain and request assistance   Assess pain using appropriate pain scale   Administer analgesics based on type and severity of pain and evaluate response   Implement non-pharmacological measures as appropriate and evaluate response     Problem: ABCDS Injury Assessment  Goal: Absence of physical injury  4/4/2024 1052 by Bridget Chicas RN  Outcome: Progressing  4/4/2024 0502 by Ashlie Elizalde RN  Outcome: Progressing  Flowsheets (Taken 4/3/2024 2201)  Absence of Physical Injury: Implement safety measures based on patient assessment     Problem: Skin/Tissue Integrity  Goal: Absence of new skin breakdown  Description: 1.  Monitor for areas of redness and/or skin breakdown  2.  Assess vascular access sites hourly  3.  Every 4-6 hours minimum:  Change oxygen saturation probe

## 2024-04-04 NOTE — PLAN OF CARE
Problem: Discharge Planning  Goal: Discharge to home or other facility with appropriate resources  Outcome: Progressing  Flowsheets (Taken 4/3/2024 2201)  Discharge to home or other facility with appropriate resources:   Identify barriers to discharge with patient and caregiver   Arrange for needed discharge resources and transportation as appropriate   Identify discharge learning needs (meds, wound care, etc)     Problem: Pain  Goal: Verbalizes/displays adequate comfort level or baseline comfort level  Outcome: Progressing  Flowsheets  Taken 4/3/2024 2201 by Ashlie Elizalde RN  Verbalizes/displays adequate comfort level or baseline comfort level:   Encourage patient to monitor pain and request assistance   Assess pain using appropriate pain scale   Administer analgesics based on type and severity of pain and evaluate response  Taken 4/3/2024 1600 by Yokasta Birch RN  Verbalizes/displays adequate comfort level or baseline comfort level:   Encourage patient to monitor pain and request assistance   Assess pain using appropriate pain scale   Administer analgesics based on type and severity of pain and evaluate response   Implement non-pharmacological measures as appropriate and evaluate response     Problem: ABCDS Injury Assessment  Goal: Absence of physical injury  Outcome: Progressing  Flowsheets (Taken 4/3/2024 2201)  Absence of Physical Injury: Implement safety measures based on patient assessment     Problem: Skin/Tissue Integrity  Goal: Absence of new skin breakdown  Description: 1.  Monitor for areas of redness and/or skin breakdown  2.  Assess vascular access sites hourly  3.  Every 4-6 hours minimum:  Change oxygen saturation probe site  4.  Every 4-6 hours:  If on nasal continuous positive airway pressure, respiratory therapy assess nares and determine need for appliance change or resting period.  Outcome: Progressing     Problem: Safety - Adult  Goal: Free from fall injury  Outcome:

## 2024-04-05 LAB
ANION GAP SERPL CALC-SCNC: 9 MMOL/L (ref 3–18)
BUN SERPL-MCNC: 76 MG/DL (ref 7–18)
BUN/CREAT SERPL: 22 (ref 12–20)
CALCIUM SERPL-MCNC: 8 MG/DL (ref 8.5–10.1)
CHLORIDE SERPL-SCNC: 101 MMOL/L (ref 100–111)
CO2 SERPL-SCNC: 21 MMOL/L (ref 21–32)
CREAT SERPL-MCNC: 3.51 MG/DL (ref 0.6–1.3)
GLUCOSE BLD STRIP.AUTO-MCNC: 114 MG/DL (ref 70–110)
GLUCOSE BLD STRIP.AUTO-MCNC: 124 MG/DL (ref 70–110)
GLUCOSE BLD STRIP.AUTO-MCNC: 146 MG/DL (ref 70–110)
GLUCOSE BLD STRIP.AUTO-MCNC: 158 MG/DL (ref 70–110)
GLUCOSE SERPL-MCNC: 168 MG/DL (ref 74–99)
POTASSIUM SERPL-SCNC: 4 MMOL/L (ref 3.5–5.5)
SODIUM SERPL-SCNC: 131 MMOL/L (ref 136–145)

## 2024-04-05 PROCEDURE — 80048 BASIC METABOLIC PNL TOTAL CA: CPT

## 2024-04-05 PROCEDURE — 97116 GAIT TRAINING THERAPY: CPT

## 2024-04-05 PROCEDURE — 6370000000 HC RX 637 (ALT 250 FOR IP): Performed by: INTERNAL MEDICINE

## 2024-04-05 PROCEDURE — 6370000000 HC RX 637 (ALT 250 FOR IP): Performed by: HOSPITALIST

## 2024-04-05 PROCEDURE — 6370000000 HC RX 637 (ALT 250 FOR IP): Performed by: EMERGENCY MEDICINE

## 2024-04-05 PROCEDURE — 6360000002 HC RX W HCPCS: Performed by: STUDENT IN AN ORGANIZED HEALTH CARE EDUCATION/TRAINING PROGRAM

## 2024-04-05 PROCEDURE — 2580000003 HC RX 258: Performed by: STUDENT IN AN ORGANIZED HEALTH CARE EDUCATION/TRAINING PROGRAM

## 2024-04-05 PROCEDURE — 82962 GLUCOSE BLOOD TEST: CPT

## 2024-04-05 PROCEDURE — 1100000003 HC PRIVATE W/ TELEMETRY

## 2024-04-05 PROCEDURE — 6360000002 HC RX W HCPCS: Performed by: INTERNAL MEDICINE

## 2024-04-05 RX ORDER — PANTOPRAZOLE SODIUM 40 MG/1
40 TABLET, DELAYED RELEASE ORAL
Status: DISCONTINUED | OUTPATIENT
Start: 2024-04-05 | End: 2024-04-09

## 2024-04-05 RX ADMIN — CARVEDILOL 6.25 MG: 3.12 TABLET, FILM COATED ORAL at 21:34

## 2024-04-05 RX ADMIN — GUAIFENESIN 600 MG: 600 TABLET, EXTENDED RELEASE ORAL at 08:52

## 2024-04-05 RX ADMIN — TRAMADOL HYDROCHLORIDE 50 MG: 50 TABLET ORAL at 23:42

## 2024-04-05 RX ADMIN — PANTOPRAZOLE SODIUM 40 MG: 40 TABLET, DELAYED RELEASE ORAL at 17:37

## 2024-04-05 RX ADMIN — HEPARIN SODIUM 5000 UNITS: 5000 INJECTION INTRAVENOUS; SUBCUTANEOUS at 14:25

## 2024-04-05 RX ADMIN — IRON SUCROSE 200 MG: 20 INJECTION, SOLUTION INTRAVENOUS at 17:37

## 2024-04-05 RX ADMIN — GUAIFENESIN 600 MG: 600 TABLET, EXTENDED RELEASE ORAL at 21:34

## 2024-04-05 RX ADMIN — HYDRALAZINE HYDROCHLORIDE 50 MG: 50 TABLET, FILM COATED ORAL at 07:04

## 2024-04-05 RX ADMIN — TRAMADOL HYDROCHLORIDE 50 MG: 50 TABLET ORAL at 08:51

## 2024-04-05 RX ADMIN — TRAMADOL HYDROCHLORIDE 50 MG: 50 TABLET ORAL at 00:27

## 2024-04-05 RX ADMIN — HYDRALAZINE HYDROCHLORIDE 50 MG: 50 TABLET, FILM COATED ORAL at 14:26

## 2024-04-05 RX ADMIN — HEPARIN SODIUM 5000 UNITS: 5000 INJECTION INTRAVENOUS; SUBCUTANEOUS at 21:34

## 2024-04-05 RX ADMIN — HYDRALAZINE HYDROCHLORIDE 50 MG: 50 TABLET, FILM COATED ORAL at 21:34

## 2024-04-05 RX ADMIN — TRAMADOL HYDROCHLORIDE 50 MG: 50 TABLET ORAL at 15:55

## 2024-04-05 RX ADMIN — HEPARIN SODIUM 5000 UNITS: 5000 INJECTION INTRAVENOUS; SUBCUTANEOUS at 07:04

## 2024-04-05 RX ADMIN — NIFEDIPINE 30 MG: 30 TABLET, EXTENDED RELEASE ORAL at 08:52

## 2024-04-05 RX ADMIN — CARVEDILOL 6.25 MG: 3.12 TABLET, FILM COATED ORAL at 08:52

## 2024-04-05 ASSESSMENT — PAIN DESCRIPTION - LOCATION
LOCATION: LEG;BACK
LOCATION: BACK;LEG
LOCATION: HIP;BACK
LOCATION: BACK;LEG

## 2024-04-05 ASSESSMENT — PAIN DESCRIPTION - FREQUENCY
FREQUENCY: CONTINUOUS

## 2024-04-05 ASSESSMENT — PAIN DESCRIPTION - PAIN TYPE
TYPE: CHRONIC PAIN

## 2024-04-05 ASSESSMENT — PAIN SCALES - GENERAL
PAINLEVEL_OUTOF10: 10
PAINLEVEL_OUTOF10: 10
PAINLEVEL_OUTOF10: 5
PAINLEVEL_OUTOF10: 8
PAINLEVEL_OUTOF10: 10
PAINLEVEL_OUTOF10: 8
PAINLEVEL_OUTOF10: 10
PAINLEVEL_OUTOF10: 10
PAINLEVEL_OUTOF10: 5
PAINLEVEL_OUTOF10: 10

## 2024-04-05 ASSESSMENT — PAIN DESCRIPTION - ORIENTATION
ORIENTATION: RIGHT;LEFT;LOWER
ORIENTATION: LOWER
ORIENTATION: RIGHT;LEFT

## 2024-04-05 ASSESSMENT — PAIN DESCRIPTION - DESCRIPTORS
DESCRIPTORS: ACHING
DESCRIPTORS: ACHING

## 2024-04-05 ASSESSMENT — PAIN DESCRIPTION - ONSET: ONSET: ON-GOING

## 2024-04-06 ENCOUNTER — APPOINTMENT (OUTPATIENT)
Facility: HOSPITAL | Age: 79
DRG: 208 | End: 2024-04-06
Payer: MEDICARE

## 2024-04-06 LAB
GLUCOSE BLD STRIP.AUTO-MCNC: 129 MG/DL (ref 70–110)
GLUCOSE BLD STRIP.AUTO-MCNC: 141 MG/DL (ref 70–110)
GLUCOSE BLD STRIP.AUTO-MCNC: 144 MG/DL (ref 70–110)
GLUCOSE BLD STRIP.AUTO-MCNC: 173 MG/DL (ref 70–110)

## 2024-04-06 PROCEDURE — 6370000000 HC RX 637 (ALT 250 FOR IP): Performed by: INTERNAL MEDICINE

## 2024-04-06 PROCEDURE — 6370000000 HC RX 637 (ALT 250 FOR IP): Performed by: HOSPITALIST

## 2024-04-06 PROCEDURE — 71045 X-RAY EXAM CHEST 1 VIEW: CPT

## 2024-04-06 PROCEDURE — 51702 INSERT TEMP BLADDER CATH: CPT

## 2024-04-06 PROCEDURE — 94640 AIRWAY INHALATION TREATMENT: CPT

## 2024-04-06 PROCEDURE — 2700000000 HC OXYGEN THERAPY PER DAY

## 2024-04-06 PROCEDURE — 82962 GLUCOSE BLOOD TEST: CPT

## 2024-04-06 PROCEDURE — 6370000000 HC RX 637 (ALT 250 FOR IP): Performed by: EMERGENCY MEDICINE

## 2024-04-06 PROCEDURE — 51798 US URINE CAPACITY MEASURE: CPT

## 2024-04-06 PROCEDURE — 2580000003 HC RX 258: Performed by: STUDENT IN AN ORGANIZED HEALTH CARE EDUCATION/TRAINING PROGRAM

## 2024-04-06 PROCEDURE — 6360000002 HC RX W HCPCS: Performed by: INTERNAL MEDICINE

## 2024-04-06 PROCEDURE — 1100000003 HC PRIVATE W/ TELEMETRY

## 2024-04-06 PROCEDURE — 6360000002 HC RX W HCPCS: Performed by: STUDENT IN AN ORGANIZED HEALTH CARE EDUCATION/TRAINING PROGRAM

## 2024-04-06 RX ORDER — IPRATROPIUM BROMIDE AND ALBUTEROL SULFATE 2.5; .5 MG/3ML; MG/3ML
1 SOLUTION RESPIRATORY (INHALATION)
Status: DISCONTINUED | OUTPATIENT
Start: 2024-04-06 | End: 2024-04-09 | Stop reason: HOSPADM

## 2024-04-06 RX ADMIN — HEPARIN SODIUM 5000 UNITS: 5000 INJECTION INTRAVENOUS; SUBCUTANEOUS at 14:59

## 2024-04-06 RX ADMIN — DICLOFENAC SODIUM 2 G: 10 GEL TOPICAL at 14:59

## 2024-04-06 RX ADMIN — TRAMADOL HYDROCHLORIDE 50 MG: 50 TABLET ORAL at 07:42

## 2024-04-06 RX ADMIN — HYDRALAZINE HYDROCHLORIDE 50 MG: 50 TABLET, FILM COATED ORAL at 15:56

## 2024-04-06 RX ADMIN — GUAIFENESIN 600 MG: 600 TABLET, EXTENDED RELEASE ORAL at 22:41

## 2024-04-06 RX ADMIN — PANTOPRAZOLE SODIUM 40 MG: 40 TABLET, DELAYED RELEASE ORAL at 05:36

## 2024-04-06 RX ADMIN — IPRATROPIUM BROMIDE AND ALBUTEROL SULFATE 1 DOSE: .5; 3 SOLUTION RESPIRATORY (INHALATION) at 20:00

## 2024-04-06 RX ADMIN — IPRATROPIUM BROMIDE AND ALBUTEROL SULFATE 1 DOSE: .5; 3 SOLUTION RESPIRATORY (INHALATION) at 11:13

## 2024-04-06 RX ADMIN — CARVEDILOL 6.25 MG: 3.12 TABLET, FILM COATED ORAL at 10:20

## 2024-04-06 RX ADMIN — DICLOFENAC SODIUM 2 G: 10 GEL TOPICAL at 22:42

## 2024-04-06 RX ADMIN — TRAMADOL HYDROCHLORIDE 50 MG: 50 TABLET ORAL at 14:59

## 2024-04-06 RX ADMIN — IPRATROPIUM BROMIDE AND ALBUTEROL SULFATE 1 DOSE: .5; 3 SOLUTION RESPIRATORY (INHALATION) at 15:27

## 2024-04-06 RX ADMIN — HEPARIN SODIUM 5000 UNITS: 5000 INJECTION INTRAVENOUS; SUBCUTANEOUS at 22:42

## 2024-04-06 RX ADMIN — GUAIFENESIN 600 MG: 600 TABLET, EXTENDED RELEASE ORAL at 10:20

## 2024-04-06 RX ADMIN — HYDRALAZINE HYDROCHLORIDE 50 MG: 50 TABLET, FILM COATED ORAL at 05:36

## 2024-04-06 RX ADMIN — HEPARIN SODIUM 5000 UNITS: 5000 INJECTION INTRAVENOUS; SUBCUTANEOUS at 05:37

## 2024-04-06 RX ADMIN — HYDRALAZINE HYDROCHLORIDE 50 MG: 50 TABLET, FILM COATED ORAL at 22:41

## 2024-04-06 RX ADMIN — CARVEDILOL 6.25 MG: 3.12 TABLET, FILM COATED ORAL at 22:41

## 2024-04-06 RX ADMIN — NIFEDIPINE 30 MG: 30 TABLET, EXTENDED RELEASE ORAL at 10:20

## 2024-04-06 RX ADMIN — IRON SUCROSE 200 MG: 20 INJECTION, SOLUTION INTRAVENOUS at 17:29

## 2024-04-06 RX ADMIN — TRAMADOL HYDROCHLORIDE 50 MG: 50 TABLET ORAL at 22:40

## 2024-04-06 RX ADMIN — ACETAMINOPHEN 650 MG: 325 TABLET ORAL at 10:20

## 2024-04-06 ASSESSMENT — PAIN DESCRIPTION - ORIENTATION
ORIENTATION: RIGHT;LEFT
ORIENTATION: RIGHT;ANTERIOR
ORIENTATION: RIGHT;LEFT
ORIENTATION: RIGHT;LEFT

## 2024-04-06 ASSESSMENT — PAIN SCALES - GENERAL
PAINLEVEL_OUTOF10: 6
PAINLEVEL_OUTOF10: 10
PAINLEVEL_OUTOF10: 10
PAINLEVEL_OUTOF10: 9
PAINLEVEL_OUTOF10: 7
PAINLEVEL_OUTOF10: 10
PAINLEVEL_OUTOF10: 6

## 2024-04-06 ASSESSMENT — PAIN DESCRIPTION - LOCATION
LOCATION: HIP
LOCATION: HIP
LOCATION: LEG;HIP
LOCATION: HIP

## 2024-04-06 ASSESSMENT — PAIN DESCRIPTION - DESCRIPTORS
DESCRIPTORS: ACHING
DESCRIPTORS: ACHING;DISCOMFORT
DESCRIPTORS: ACHING;DISCOMFORT
DESCRIPTORS: ACHING
DESCRIPTORS: ACHING;DISCOMFORT
DESCRIPTORS: ACHING;DISCOMFORT

## 2024-04-06 ASSESSMENT — PAIN DESCRIPTION - ONSET: ONSET: ON-GOING

## 2024-04-06 ASSESSMENT — PAIN DESCRIPTION - FREQUENCY: FREQUENCY: CONTINUOUS

## 2024-04-07 LAB
ANION GAP SERPL CALC-SCNC: 10 MMOL/L (ref 3–18)
BASOPHILS # BLD: 0 K/UL (ref 0–0.1)
BASOPHILS NFR BLD: 0 % (ref 0–2)
BUN SERPL-MCNC: 74 MG/DL (ref 7–18)
BUN/CREAT SERPL: 21 (ref 12–20)
CALCIUM SERPL-MCNC: 8.4 MG/DL (ref 8.5–10.1)
CHLORIDE SERPL-SCNC: 100 MMOL/L (ref 100–111)
CO2 SERPL-SCNC: 19 MMOL/L (ref 21–32)
CREAT SERPL-MCNC: 3.58 MG/DL (ref 0.6–1.3)
DIFFERENTIAL METHOD BLD: ABNORMAL
EOSINOPHIL # BLD: 0 K/UL (ref 0–0.4)
EOSINOPHIL NFR BLD: 0 % (ref 0–5)
ERYTHROCYTE [DISTWIDTH] IN BLOOD BY AUTOMATED COUNT: 12.6 % (ref 11.6–14.5)
GLUCOSE BLD STRIP.AUTO-MCNC: 105 MG/DL (ref 70–110)
GLUCOSE BLD STRIP.AUTO-MCNC: 123 MG/DL (ref 70–110)
GLUCOSE BLD STRIP.AUTO-MCNC: 146 MG/DL (ref 70–110)
GLUCOSE BLD STRIP.AUTO-MCNC: 149 MG/DL (ref 70–110)
GLUCOSE SERPL-MCNC: 99 MG/DL (ref 74–99)
HCT VFR BLD AUTO: 22.9 % (ref 35–45)
HGB BLD-MCNC: 7.6 G/DL (ref 12–16)
IMM GRANULOCYTES # BLD AUTO: 0 K/UL (ref 0–0.04)
IMM GRANULOCYTES NFR BLD AUTO: 1 % (ref 0–0.5)
LYMPHOCYTES # BLD: 0.7 K/UL (ref 0.9–3.6)
LYMPHOCYTES NFR BLD: 17 % (ref 21–52)
MCH RBC QN AUTO: 31.9 PG (ref 24–34)
MCHC RBC AUTO-ENTMCNC: 33.2 G/DL (ref 31–37)
MCV RBC AUTO: 96.2 FL (ref 78–100)
MONOCYTES # BLD: 0.2 K/UL (ref 0.05–1.2)
MONOCYTES NFR BLD: 6 % (ref 3–10)
NEUTS SEG # BLD: 3 K/UL (ref 1.8–8)
NEUTS SEG NFR BLD: 76 % (ref 40–73)
NRBC # BLD: 0 K/UL (ref 0–0.01)
NRBC BLD-RTO: 0 PER 100 WBC
PLATELET # BLD AUTO: 181 K/UL (ref 135–420)
PMV BLD AUTO: 10.3 FL (ref 9.2–11.8)
POTASSIUM SERPL-SCNC: 4.1 MMOL/L (ref 3.5–5.5)
RBC # BLD AUTO: 2.38 M/UL (ref 4.2–5.3)
SODIUM SERPL-SCNC: 129 MMOL/L (ref 136–145)
WBC # BLD AUTO: 3.9 K/UL (ref 4.6–13.2)

## 2024-04-07 PROCEDURE — 85025 COMPLETE CBC W/AUTO DIFF WBC: CPT

## 2024-04-07 PROCEDURE — 94640 AIRWAY INHALATION TREATMENT: CPT

## 2024-04-07 PROCEDURE — 36415 COLL VENOUS BLD VENIPUNCTURE: CPT

## 2024-04-07 PROCEDURE — 6360000002 HC RX W HCPCS: Performed by: STUDENT IN AN ORGANIZED HEALTH CARE EDUCATION/TRAINING PROGRAM

## 2024-04-07 PROCEDURE — 6370000000 HC RX 637 (ALT 250 FOR IP): Performed by: STUDENT IN AN ORGANIZED HEALTH CARE EDUCATION/TRAINING PROGRAM

## 2024-04-07 PROCEDURE — 6370000000 HC RX 637 (ALT 250 FOR IP): Performed by: EMERGENCY MEDICINE

## 2024-04-07 PROCEDURE — 2580000003 HC RX 258: Performed by: STUDENT IN AN ORGANIZED HEALTH CARE EDUCATION/TRAINING PROGRAM

## 2024-04-07 PROCEDURE — 6370000000 HC RX 637 (ALT 250 FOR IP): Performed by: INTERNAL MEDICINE

## 2024-04-07 PROCEDURE — 1100000003 HC PRIVATE W/ TELEMETRY

## 2024-04-07 PROCEDURE — 6360000002 HC RX W HCPCS: Performed by: INTERNAL MEDICINE

## 2024-04-07 PROCEDURE — 82962 GLUCOSE BLOOD TEST: CPT

## 2024-04-07 PROCEDURE — 2700000000 HC OXYGEN THERAPY PER DAY

## 2024-04-07 PROCEDURE — 6370000000 HC RX 637 (ALT 250 FOR IP): Performed by: HOSPITALIST

## 2024-04-07 PROCEDURE — 80048 BASIC METABOLIC PNL TOTAL CA: CPT

## 2024-04-07 PROCEDURE — 51702 INSERT TEMP BLADDER CATH: CPT

## 2024-04-07 RX ORDER — SODIUM BICARBONATE 650 MG/1
650 TABLET ORAL 2 TIMES DAILY
Status: DISCONTINUED | OUTPATIENT
Start: 2024-04-07 | End: 2024-04-09 | Stop reason: HOSPADM

## 2024-04-07 RX ORDER — OXYCODONE HYDROCHLORIDE AND ACETAMINOPHEN 5; 325 MG/1; MG/1
1 TABLET ORAL EVERY 6 HOURS PRN
Status: DISCONTINUED | OUTPATIENT
Start: 2024-04-07 | End: 2024-04-09 | Stop reason: HOSPADM

## 2024-04-07 RX ADMIN — TRAMADOL HYDROCHLORIDE 50 MG: 50 TABLET ORAL at 14:47

## 2024-04-07 RX ADMIN — PANTOPRAZOLE SODIUM 40 MG: 40 TABLET, DELAYED RELEASE ORAL at 06:46

## 2024-04-07 RX ADMIN — HYDRALAZINE HYDROCHLORIDE 50 MG: 50 TABLET, FILM COATED ORAL at 21:28

## 2024-04-07 RX ADMIN — NIFEDIPINE 30 MG: 30 TABLET, EXTENDED RELEASE ORAL at 08:42

## 2024-04-07 RX ADMIN — SODIUM BICARBONATE 650 MG TABLET 650 MG: at 21:27

## 2024-04-07 RX ADMIN — IRON SUCROSE 200 MG: 20 INJECTION, SOLUTION INTRAVENOUS at 16:45

## 2024-04-07 RX ADMIN — DICLOFENAC SODIUM 2 G: 10 GEL TOPICAL at 21:29

## 2024-04-07 RX ADMIN — TRAMADOL HYDROCHLORIDE 50 MG: 50 TABLET ORAL at 21:27

## 2024-04-07 RX ADMIN — IPRATROPIUM BROMIDE AND ALBUTEROL SULFATE 1 DOSE: .5; 3 SOLUTION RESPIRATORY (INHALATION) at 07:51

## 2024-04-07 RX ADMIN — DICLOFENAC SODIUM 2 G: 10 GEL TOPICAL at 14:48

## 2024-04-07 RX ADMIN — TRAMADOL HYDROCHLORIDE 50 MG: 50 TABLET ORAL at 08:42

## 2024-04-07 RX ADMIN — HEPARIN SODIUM 5000 UNITS: 5000 INJECTION INTRAVENOUS; SUBCUTANEOUS at 21:28

## 2024-04-07 RX ADMIN — DICLOFENAC SODIUM 2 G: 10 GEL TOPICAL at 08:43

## 2024-04-07 RX ADMIN — HEPARIN SODIUM 5000 UNITS: 5000 INJECTION INTRAVENOUS; SUBCUTANEOUS at 14:47

## 2024-04-07 RX ADMIN — SODIUM BICARBONATE 650 MG TABLET 650 MG: at 11:10

## 2024-04-07 RX ADMIN — GUAIFENESIN 600 MG: 600 TABLET, EXTENDED RELEASE ORAL at 08:42

## 2024-04-07 RX ADMIN — CARVEDILOL 6.25 MG: 3.12 TABLET, FILM COATED ORAL at 08:42

## 2024-04-07 RX ADMIN — CARVEDILOL 6.25 MG: 3.12 TABLET, FILM COATED ORAL at 21:28

## 2024-04-07 RX ADMIN — GUAIFENESIN 600 MG: 600 TABLET, EXTENDED RELEASE ORAL at 21:28

## 2024-04-07 RX ADMIN — HYDRALAZINE HYDROCHLORIDE 50 MG: 50 TABLET, FILM COATED ORAL at 14:47

## 2024-04-07 RX ADMIN — IPRATROPIUM BROMIDE AND ALBUTEROL SULFATE 1 DOSE: .5; 3 SOLUTION RESPIRATORY (INHALATION) at 20:58

## 2024-04-07 RX ADMIN — IPRATROPIUM BROMIDE AND ALBUTEROL SULFATE 1 DOSE: .5; 3 SOLUTION RESPIRATORY (INHALATION) at 17:09

## 2024-04-07 RX ADMIN — HYDRALAZINE HYDROCHLORIDE 50 MG: 50 TABLET, FILM COATED ORAL at 06:46

## 2024-04-07 RX ADMIN — IPRATROPIUM BROMIDE AND ALBUTEROL SULFATE 1 DOSE: .5; 3 SOLUTION RESPIRATORY (INHALATION) at 11:50

## 2024-04-07 ASSESSMENT — PAIN DESCRIPTION - ORIENTATION
ORIENTATION: RIGHT;LEFT

## 2024-04-07 ASSESSMENT — PAIN DESCRIPTION - DESCRIPTORS
DESCRIPTORS: ACHING;DISCOMFORT
DESCRIPTORS: ACHING
DESCRIPTORS: ACHING;DISCOMFORT

## 2024-04-07 ASSESSMENT — PAIN SCALES - GENERAL
PAINLEVEL_OUTOF10: 7
PAINLEVEL_OUTOF10: 10
PAINLEVEL_OUTOF10: 10
PAINLEVEL_OUTOF10: 5

## 2024-04-07 ASSESSMENT — PAIN - FUNCTIONAL ASSESSMENT: PAIN_FUNCTIONAL_ASSESSMENT: PREVENTS OR INTERFERES SOME ACTIVE ACTIVITIES AND ADLS

## 2024-04-07 ASSESSMENT — PAIN DESCRIPTION - LOCATION
LOCATION: HIP

## 2024-04-07 ASSESSMENT — PAIN DESCRIPTION - ONSET: ONSET: ON-GOING

## 2024-04-07 ASSESSMENT — PAIN DESCRIPTION - PAIN TYPE: TYPE: CHRONIC PAIN

## 2024-04-07 ASSESSMENT — PAIN DESCRIPTION - FREQUENCY: FREQUENCY: CONTINUOUS

## 2024-04-07 NOTE — PLAN OF CARE
Problem: Pain  Goal: Verbalizes/displays adequate comfort level or baseline comfort level  Outcome: Progressing  Flowsheets  Taken 4/6/2024 1100 by Apryl Stephenson RN  Verbalizes/displays adequate comfort level or baseline comfort level:   Encourage patient to monitor pain and request assistance   Assess pain using appropriate pain scale  Taken 4/6/2024 0745 by Apryl Stephenson RN  Verbalizes/displays adequate comfort level or baseline comfort level:   Assess pain using appropriate pain scale   Encourage patient to monitor pain and request assistance     Problem: ABCDS Injury Assessment  Goal: Absence of physical injury  Outcome: Progressing     Problem: Skin/Tissue Integrity  Goal: Absence of new skin breakdown  Outcome: Progressing     Problem: Safety - Adult  Goal: Free from fall injury  Outcome: Progressing     Problem: Chronic Conditions and Co-morbidities  Goal: Patient's chronic conditions and co-morbidity symptoms are monitored and maintained or improved  Outcome: Progressing  Flowsheets (Taken 4/6/2024 0800 by Apryl Stephenson RN)  Care Plan - Patient's Chronic Conditions and Co-Morbidity Symptoms are Monitored and Maintained or Improved:   Monitor and assess patient's chronic conditions and comorbid symptoms for stability, deterioration, or improvement   Collaborate with multidisciplinary team to address chronic and comorbid conditions and prevent exacerbation or deterioration   Update acute care plan with appropriate goals if chronic or comorbid symptoms are exacerbated and prevent overall improvement and discharge

## 2024-04-07 NOTE — PLAN OF CARE
Problem: Pain  Goal: Verbalizes/displays adequate comfort level or baseline comfort level  Outcome: Progressing  Flowsheets  Taken 4/7/2024 1550 by Apryl Stephenson RN  Verbalizes/displays adequate comfort level or baseline comfort level:   Encourage patient to monitor pain and request assistance   Assess pain using appropriate pain scale  Taken 4/7/2024 1115 by Apryl Stephenson RN  Verbalizes/displays adequate comfort level or baseline comfort level:   Assess pain using appropriate pain scale   Encourage patient to monitor pain and request assistance     Problem: Safety - Adult  Goal: Free from fall injury  Outcome: Progressing     Problem: Chronic Conditions and Co-morbidities  Goal: Patient's chronic conditions and co-morbidity symptoms are monitored and maintained or improved  Outcome: Progressing  Flowsheets (Taken 4/7/2024 0800 by Apryl Stephenson RN)  Care Plan - Patient's Chronic Conditions and Co-Morbidity Symptoms are Monitored and Maintained or Improved:   Monitor and assess patient's chronic conditions and comorbid symptoms for stability, deterioration, or improvement   Collaborate with multidisciplinary team to address chronic and comorbid conditions and prevent exacerbation or deterioration   Update acute care plan with appropriate goals if chronic or comorbid symptoms are exacerbated and prevent overall improvement and discharge

## 2024-04-08 ENCOUNTER — APPOINTMENT (OUTPATIENT)
Facility: HOSPITAL | Age: 79
DRG: 208 | End: 2024-04-08
Payer: MEDICARE

## 2024-04-08 LAB
ANION GAP SERPL CALC-SCNC: 11 MMOL/L (ref 3–18)
BUN SERPL-MCNC: 73 MG/DL (ref 7–18)
BUN/CREAT SERPL: 22 (ref 12–20)
CALCIUM SERPL-MCNC: 8.5 MG/DL (ref 8.5–10.1)
CHLORIDE SERPL-SCNC: 99 MMOL/L (ref 100–111)
CO2 SERPL-SCNC: 18 MMOL/L (ref 21–32)
CREAT SERPL-MCNC: 3.39 MG/DL (ref 0.6–1.3)
GLUCOSE BLD STRIP.AUTO-MCNC: 132 MG/DL (ref 70–110)
GLUCOSE BLD STRIP.AUTO-MCNC: 132 MG/DL (ref 70–110)
GLUCOSE BLD STRIP.AUTO-MCNC: 133 MG/DL (ref 70–110)
GLUCOSE BLD STRIP.AUTO-MCNC: 143 MG/DL (ref 70–110)
GLUCOSE SERPL-MCNC: 115 MG/DL (ref 74–99)
HCT VFR BLD AUTO: 21.5 % (ref 35–45)
HGB BLD-MCNC: 7.4 G/DL (ref 12–16)
POTASSIUM SERPL-SCNC: 4.1 MMOL/L (ref 3.5–5.5)
SODIUM SERPL-SCNC: 128 MMOL/L (ref 136–145)

## 2024-04-08 PROCEDURE — 2700000000 HC OXYGEN THERAPY PER DAY

## 2024-04-08 PROCEDURE — 6370000000 HC RX 637 (ALT 250 FOR IP): Performed by: INTERNAL MEDICINE

## 2024-04-08 PROCEDURE — 6360000002 HC RX W HCPCS: Performed by: INTERNAL MEDICINE

## 2024-04-08 PROCEDURE — 85018 HEMOGLOBIN: CPT

## 2024-04-08 PROCEDURE — 6370000000 HC RX 637 (ALT 250 FOR IP): Performed by: HOSPITALIST

## 2024-04-08 PROCEDURE — 6370000000 HC RX 637 (ALT 250 FOR IP): Performed by: EMERGENCY MEDICINE

## 2024-04-08 PROCEDURE — 1100000003 HC PRIVATE W/ TELEMETRY

## 2024-04-08 PROCEDURE — 82962 GLUCOSE BLOOD TEST: CPT

## 2024-04-08 PROCEDURE — 2580000003 HC RX 258: Performed by: STUDENT IN AN ORGANIZED HEALTH CARE EDUCATION/TRAINING PROGRAM

## 2024-04-08 PROCEDURE — 85014 HEMATOCRIT: CPT

## 2024-04-08 PROCEDURE — 97110 THERAPEUTIC EXERCISES: CPT

## 2024-04-08 PROCEDURE — 6360000002 HC RX W HCPCS: Performed by: HOSPITALIST

## 2024-04-08 PROCEDURE — 6360000002 HC RX W HCPCS: Performed by: STUDENT IN AN ORGANIZED HEALTH CARE EDUCATION/TRAINING PROGRAM

## 2024-04-08 PROCEDURE — 71045 X-RAY EXAM CHEST 1 VIEW: CPT

## 2024-04-08 PROCEDURE — 80048 BASIC METABOLIC PNL TOTAL CA: CPT

## 2024-04-08 PROCEDURE — 36415 COLL VENOUS BLD VENIPUNCTURE: CPT

## 2024-04-08 PROCEDURE — 6370000000 HC RX 637 (ALT 250 FOR IP): Performed by: STUDENT IN AN ORGANIZED HEALTH CARE EDUCATION/TRAINING PROGRAM

## 2024-04-08 PROCEDURE — 94640 AIRWAY INHALATION TREATMENT: CPT

## 2024-04-08 RX ORDER — SODIUM BICARBONATE 650 MG/1
650 TABLET ORAL 2 TIMES DAILY
Status: DISCONTINUED | OUTPATIENT
Start: 2024-04-08 | End: 2024-04-08

## 2024-04-08 RX ORDER — GLUCAGON 1 MG/ML
1 KIT INJECTION PRN
Status: DISCONTINUED | OUTPATIENT
Start: 2024-04-08 | End: 2024-04-09 | Stop reason: HOSPADM

## 2024-04-08 RX ORDER — DEXTROSE MONOHYDRATE 100 MG/ML
INJECTION, SOLUTION INTRAVENOUS CONTINUOUS PRN
Status: DISCONTINUED | OUTPATIENT
Start: 2024-04-08 | End: 2024-04-09 | Stop reason: HOSPADM

## 2024-04-08 RX ORDER — FUROSEMIDE 10 MG/ML
40 INJECTION INTRAMUSCULAR; INTRAVENOUS ONCE
Status: COMPLETED | OUTPATIENT
Start: 2024-04-08 | End: 2024-04-08

## 2024-04-08 RX ORDER — BUDESONIDE 0.5 MG/2ML
0.5 INHALANT ORAL
Status: DISCONTINUED | OUTPATIENT
Start: 2024-04-08 | End: 2024-04-09 | Stop reason: HOSPADM

## 2024-04-08 RX ADMIN — IPRATROPIUM BROMIDE AND ALBUTEROL SULFATE 1 DOSE: .5; 3 SOLUTION RESPIRATORY (INHALATION) at 12:26

## 2024-04-08 RX ADMIN — HYDRALAZINE HYDROCHLORIDE 50 MG: 50 TABLET, FILM COATED ORAL at 13:52

## 2024-04-08 RX ADMIN — IPRATROPIUM BROMIDE AND ALBUTEROL SULFATE 1 DOSE: .5; 3 SOLUTION RESPIRATORY (INHALATION) at 07:57

## 2024-04-08 RX ADMIN — NIFEDIPINE 30 MG: 30 TABLET, EXTENDED RELEASE ORAL at 09:03

## 2024-04-08 RX ADMIN — GUAIFENESIN 600 MG: 600 TABLET, EXTENDED RELEASE ORAL at 09:03

## 2024-04-08 RX ADMIN — BUDESONIDE 500 MCG: 0.5 INHALANT RESPIRATORY (INHALATION) at 12:26

## 2024-04-08 RX ADMIN — HEPARIN SODIUM 5000 UNITS: 5000 INJECTION INTRAVENOUS; SUBCUTANEOUS at 06:56

## 2024-04-08 RX ADMIN — SODIUM BICARBONATE 650 MG TABLET 650 MG: at 09:05

## 2024-04-08 RX ADMIN — CARVEDILOL 6.25 MG: 3.12 TABLET, FILM COATED ORAL at 09:03

## 2024-04-08 RX ADMIN — HYDRALAZINE HYDROCHLORIDE 50 MG: 50 TABLET, FILM COATED ORAL at 06:56

## 2024-04-08 RX ADMIN — BUDESONIDE 500 MCG: 0.5 INHALANT RESPIRATORY (INHALATION) at 20:31

## 2024-04-08 RX ADMIN — TRAMADOL HYDROCHLORIDE 50 MG: 50 TABLET ORAL at 06:55

## 2024-04-08 RX ADMIN — DICLOFENAC SODIUM 2 G: 10 GEL TOPICAL at 09:04

## 2024-04-08 RX ADMIN — SODIUM BICARBONATE 650 MG TABLET 650 MG: at 23:19

## 2024-04-08 RX ADMIN — DICLOFENAC SODIUM 2 G: 10 GEL TOPICAL at 15:19

## 2024-04-08 RX ADMIN — IRON SUCROSE 200 MG: 20 INJECTION, SOLUTION INTRAVENOUS at 15:36

## 2024-04-08 RX ADMIN — IPRATROPIUM BROMIDE AND ALBUTEROL SULFATE 1 DOSE: .5; 3 SOLUTION RESPIRATORY (INHALATION) at 20:31

## 2024-04-08 RX ADMIN — HYDRALAZINE HYDROCHLORIDE 50 MG: 50 TABLET, FILM COATED ORAL at 23:19

## 2024-04-08 RX ADMIN — PANTOPRAZOLE SODIUM 40 MG: 40 TABLET, DELAYED RELEASE ORAL at 06:55

## 2024-04-08 RX ADMIN — GUAIFENESIN 600 MG: 600 TABLET, EXTENDED RELEASE ORAL at 23:19

## 2024-04-08 RX ADMIN — CARVEDILOL 6.25 MG: 3.12 TABLET, FILM COATED ORAL at 23:19

## 2024-04-08 RX ADMIN — FUROSEMIDE 40 MG: 10 INJECTION, SOLUTION INTRAMUSCULAR; INTRAVENOUS at 10:57

## 2024-04-08 ASSESSMENT — PAIN SCALES - GENERAL
PAINLEVEL_OUTOF10: 2
PAINLEVEL_OUTOF10: 2
PAINLEVEL_OUTOF10: 9

## 2024-04-08 ASSESSMENT — PAIN SCALES - WONG BAKER
WONGBAKER_NUMERICALRESPONSE: HURTS A LITTLE BIT
WONGBAKER_NUMERICALRESPONSE: HURTS A LITTLE BIT

## 2024-04-08 ASSESSMENT — PAIN DESCRIPTION - LOCATION: LOCATION: GENERALIZED

## 2024-04-08 ASSESSMENT — PAIN DESCRIPTION - DESCRIPTORS: DESCRIPTORS: ACHING

## 2024-04-08 NOTE — PLAN OF CARE
Problem: Discharge Planning  Goal: Discharge to home or other facility with appropriate resources  Outcome: Progressing     Problem: Pain  Goal: Verbalizes/displays adequate comfort level or baseline comfort level  4/8/2024 0948 by Parisa Nicole RN  Outcome: Progressing  4/7/2024 1949 by Sameer Zavala RN  Outcome: Progressing  Flowsheets  Taken 4/7/2024 1550 by Apryl Stephenson RN  Verbalizes/displays adequate comfort level or baseline comfort level:   Encourage patient to monitor pain and request assistance   Assess pain using appropriate pain scale  Taken 4/7/2024 1115 by Apryl Stephenson RN  Verbalizes/displays adequate comfort level or baseline comfort level:   Assess pain using appropriate pain scale   Encourage patient to monitor pain and request assistance     Problem: ABCDS Injury Assessment  Goal: Absence of physical injury  Outcome: Progressing     Problem: Skin/Tissue Integrity  Goal: Absence of new skin breakdown  Description: 1.  Monitor for areas of redness and/or skin breakdown  2.  Assess vascular access sites hourly  3.  Every 4-6 hours minimum:  Change oxygen saturation probe site  4.  Every 4-6 hours:  If on nasal continuous positive airway pressure, respiratory therapy assess nares and determine need for appliance change or resting period.  Outcome: Progressing     Problem: Safety - Adult  Goal: Free from fall injury  4/8/2024 0948 by Parisa Nicole RN  Outcome: Progressing  4/7/2024 1949 by Sameer Zavala RN  Outcome: Progressing     Problem: Chronic Conditions and Co-morbidities  Goal: Patient's chronic conditions and co-morbidity symptoms are monitored and maintained or improved  4/8/2024 0948 by Parisa Nicole RN  Outcome: Progressing  4/7/2024 1949 by Sameer Zavala RN  Outcome: Progressing  Flowsheets (Taken 4/7/2024 0800 by Apryl Stephenson RN)  Care Plan - Patient's Chronic Conditions and Co-Morbidity Symptoms are Monitored and Maintained or

## 2024-04-09 ENCOUNTER — ANESTHESIA EVENT (OUTPATIENT)
Facility: HOSPITAL | Age: 79
DRG: 208 | End: 2024-04-09
Payer: MEDICARE

## 2024-04-09 ENCOUNTER — APPOINTMENT (OUTPATIENT)
Facility: HOSPITAL | Age: 79
DRG: 208 | End: 2024-04-09
Payer: MEDICARE

## 2024-04-09 ENCOUNTER — APPOINTMENT (OUTPATIENT)
Facility: HOSPITAL | Age: 79
DRG: 356 | End: 2024-04-09
Payer: MEDICARE

## 2024-04-09 ENCOUNTER — ANESTHESIA (OUTPATIENT)
Facility: HOSPITAL | Age: 79
End: 2024-04-09
Payer: MEDICARE

## 2024-04-09 ENCOUNTER — ANESTHESIA EVENT (OUTPATIENT)
Facility: HOSPITAL | Age: 79
End: 2024-04-09
Payer: MEDICARE

## 2024-04-09 ENCOUNTER — HOSPITAL ENCOUNTER (INPATIENT)
Facility: HOSPITAL | Age: 79
LOS: 14 days | Discharge: NURSING, REHAB OR LONG-TERM CARE | DRG: 356 | End: 2024-04-23
Attending: STUDENT IN AN ORGANIZED HEALTH CARE EDUCATION/TRAINING PROGRAM | Admitting: ANESTHESIOLOGY
Payer: MEDICARE

## 2024-04-09 ENCOUNTER — ANESTHESIA (OUTPATIENT)
Facility: HOSPITAL | Age: 79
DRG: 208 | End: 2024-04-09
Payer: MEDICARE

## 2024-04-09 ENCOUNTER — APPOINTMENT (OUTPATIENT)
Facility: HOSPITAL | Age: 79
DRG: 208 | End: 2024-04-09
Attending: INTERNAL MEDICINE
Payer: MEDICARE

## 2024-04-09 VITALS
HEIGHT: 62 IN | DIASTOLIC BLOOD PRESSURE: 40 MMHG | BODY MASS INDEX: 31.65 KG/M2 | HEART RATE: 66 BPM | RESPIRATION RATE: 12 BRPM | OXYGEN SATURATION: 100 % | WEIGHT: 172 LBS | TEMPERATURE: 95.9 F | SYSTOLIC BLOOD PRESSURE: 132 MMHG

## 2024-04-09 DIAGNOSIS — R57.8 HEMORRHAGIC SHOCK (HCC): Primary | ICD-10-CM

## 2024-04-09 LAB
ALBUMIN SERPL-MCNC: 1 G/DL (ref 3.4–5)
ALBUMIN SERPL-MCNC: 2.2 G/DL (ref 3.5–5)
ALBUMIN/GLOB SERPL: 0.7 (ref 0.8–1.7)
ALBUMIN/GLOB SERPL: 0.8 (ref 1.1–2.2)
ALP SERPL-CCNC: 32 U/L (ref 45–117)
ALP SERPL-CCNC: 83 U/L (ref 45–117)
ALT SERPL-CCNC: 169 U/L (ref 13–56)
ALT SERPL-CCNC: 3140 U/L (ref 12–78)
ANION GAP SERPL CALC-SCNC: 11 MMOL/L (ref 3–18)
ANION GAP SERPL CALC-SCNC: 12 MMOL/L (ref 3–18)
ANION GAP SERPL CALC-SCNC: 14 MMOL/L (ref 5–15)
ANION GAP SERPL CALC-SCNC: 15 MMOL/L (ref 3–18)
ANION GAP SERPL CALC-SCNC: 15 MMOL/L (ref 5–15)
ARTERIAL PATENCY WRIST A: ABNORMAL
AST SERPL-CCNC: 172 U/L (ref 10–38)
AST SERPL-CCNC: >2000 U/L (ref 15–37)
BASE DEFICIT BLD-SCNC: 11.4 MMOL/L
BASE DEFICIT BLD-SCNC: 11.6 MMOL/L
BASOPHILS # BLD: 0 K/UL (ref 0–0.1)
BASOPHILS NFR BLD: 0 % (ref 0–1)
BASOPHILS NFR BLD: 0 % (ref 0–2)
BASOPHILS NFR BLD: 0 % (ref 0–2)
BDY SITE: ABNORMAL
BDY SITE: ABNORMAL
BILIRUB SERPL-MCNC: 0.4 MG/DL (ref 0.2–1)
BILIRUB SERPL-MCNC: 1.6 MG/DL (ref 0.2–1)
BLD PROD TYP BPU: NORMAL
BLD PROD TYP BPU: NORMAL
BLOOD BANK DISPENSE STATUS: NORMAL
BLOOD BANK DISPENSE STATUS: NORMAL
BPU ID: NORMAL
BPU ID: NORMAL
BUN SERPL-MCNC: 47 MG/DL (ref 7–18)
BUN SERPL-MCNC: 78 MG/DL (ref 7–18)
BUN SERPL-MCNC: 82 MG/DL (ref 7–18)
BUN SERPL-MCNC: 90 MG/DL (ref 6–20)
BUN SERPL-MCNC: 91 MG/DL (ref 6–20)
BUN/CREAT SERPL: 20 (ref 12–20)
BUN/CREAT SERPL: 21 (ref 12–20)
BUN/CREAT SERPL: 21 (ref 12–20)
BUN/CREAT SERPL: 22 (ref 12–20)
BUN/CREAT SERPL: 22 (ref 12–20)
CA-I SERPL-SCNC: 0.73 MMOL/L (ref 1.12–1.32)
CA-I SERPL-SCNC: 1.04 MMOL/L (ref 1.12–1.32)
CALCIUM SERPL-MCNC: 8.3 MG/DL (ref 8.5–10.1)
CALCIUM SERPL-MCNC: 8.4 MG/DL (ref 8.5–10.1)
CALCIUM SERPL-MCNC: 8.7 MG/DL (ref 8.5–10.1)
CALCIUM SERPL-MCNC: 9 MG/DL (ref 8.5–10.1)
CALCIUM SERPL-MCNC: <5 MG/DL (ref 8.5–10.1)
CHLORIDE SERPL-SCNC: 122 MMOL/L (ref 100–111)
CHLORIDE SERPL-SCNC: 97 MMOL/L (ref 100–111)
CHLORIDE SERPL-SCNC: 98 MMOL/L (ref 100–111)
CHLORIDE SERPL-SCNC: 98 MMOL/L (ref 97–108)
CHLORIDE SERPL-SCNC: 99 MMOL/L (ref 97–108)
CO2 SERPL-SCNC: 10 MMOL/L (ref 21–32)
CO2 SERPL-SCNC: 13 MMOL/L (ref 21–32)
CO2 SERPL-SCNC: 15 MMOL/L (ref 21–32)
CO2 SERPL-SCNC: 16 MMOL/L (ref 21–32)
CO2 SERPL-SCNC: 18 MMOL/L (ref 21–32)
COMMENT:: NORMAL
COMMENT:: NORMAL
CREAT SERPL-MCNC: 2.14 MG/DL (ref 0.6–1.3)
CREAT SERPL-MCNC: 3.8 MG/DL (ref 0.6–1.3)
CREAT SERPL-MCNC: 4.12 MG/DL (ref 0.6–1.3)
CREAT SERPL-MCNC: 4.18 MG/DL (ref 0.55–1.02)
CREAT SERPL-MCNC: 4.23 MG/DL (ref 0.55–1.02)
DIFFERENTIAL METHOD BLD: ABNORMAL
ECHO AO ROOT DIAM: 2.7 CM
ECHO AO ROOT INDEX: 1.51 CM/M2
ECHO AV MEAN GRADIENT: 5 MMHG
ECHO AV MEAN VELOCITY: 1.1 M/S
ECHO AV PEAK GRADIENT: 8 MMHG
ECHO AV PEAK VELOCITY: 1.4 M/S
ECHO AV VTI: 30.4 CM
ECHO BSA: 1.85 M2
ECHO EST RA PRESSURE: 3 MMHG
ECHO LA DIAMETER INDEX: 1.68 CM/M2
ECHO LA DIAMETER: 3 CM
ECHO LA TO AORTIC ROOT RATIO: 1.11
ECHO LV E' LATERAL VELOCITY: 4 CM/S
ECHO LV EDV A4C: 54 ML
ECHO LV EDV INDEX A4C: 30 ML/M2
ECHO LV EJECTION FRACTION A4C: 83 %
ECHO LV ESV A4C: 9 ML
ECHO LV ESV INDEX A4C: 5 ML/M2
ECHO LV FRACTIONAL SHORTENING: 34 % (ref 28–44)
ECHO LV INTERNAL DIMENSION DIASTOLE INDEX: 2.12 CM/M2
ECHO LV INTERNAL DIMENSION DIASTOLIC: 3.8 CM (ref 3.9–5.3)
ECHO LV INTERNAL DIMENSION SYSTOLIC INDEX: 1.4 CM/M2
ECHO LV INTERNAL DIMENSION SYSTOLIC: 2.5 CM
ECHO LV IVSD: 1.1 CM (ref 0.6–0.9)
ECHO LV MASS 2D: 134.7 G (ref 67–162)
ECHO LV MASS INDEX 2D: 75.2 G/M2 (ref 43–95)
ECHO LV POSTERIOR WALL DIASTOLIC: 1.1 CM (ref 0.6–0.9)
ECHO LV RELATIVE WALL THICKNESS RATIO: 0.58
ECHO MV A VELOCITY: 0.96 M/S
ECHO MV AREA PHT: 2.7 CM2
ECHO MV E DECELERATION TIME (DT): 483.2 MS
ECHO MV E VELOCITY: 0.51 M/S
ECHO MV E/A RATIO: 0.53
ECHO MV E/E' LATERAL: 12.75
ECHO MV MAX VELOCITY: 1.2 M/S
ECHO MV MEAN GRADIENT: 1 MMHG
ECHO MV MEAN VELOCITY: 0.5 M/S
ECHO MV PEAK GRADIENT: 6 MMHG
ECHO MV PRESSURE HALF TIME (PHT): 82 MS
ECHO MV VTI: 37.5 CM
ECHO RIGHT VENTRICULAR SYSTOLIC PRESSURE (RVSP): 32 MMHG
ECHO RVOT MEAN GRADIENT: 3 MMHG
ECHO RVOT PEAK GRADIENT: 6 MMHG
ECHO RVOT PEAK VELOCITY: 1.2 M/S
ECHO RVOT VTI: 23.5 CM
ECHO TV REGURGITANT MAX VELOCITY: 2.67 M/S
ECHO TV REGURGITANT PEAK GRADIENT: 28 MMHG
EOSINOPHIL # BLD: 0 K/UL (ref 0–0.4)
EOSINOPHIL NFR BLD: 0 % (ref 0–5)
EOSINOPHIL NFR BLD: 0 % (ref 0–5)
EOSINOPHIL NFR BLD: 0 % (ref 0–7)
ERYTHROCYTE [DISTWIDTH] IN BLOOD BY AUTOMATED COUNT: 12.3 % (ref 11.6–14.5)
ERYTHROCYTE [DISTWIDTH] IN BLOOD BY AUTOMATED COUNT: 12.4 % (ref 11.6–14.5)
ERYTHROCYTE [DISTWIDTH] IN BLOOD BY AUTOMATED COUNT: 14.6 % (ref 11.6–14.5)
ERYTHROCYTE [DISTWIDTH] IN BLOOD BY AUTOMATED COUNT: 15 % (ref 11.5–14.5)
ERYTHROCYTE [DISTWIDTH] IN BLOOD BY AUTOMATED COUNT: 15.4 % (ref 11.5–14.5)
FIBRINOGEN PPP-MCNC: 305 MG/DL (ref 200–475)
GAS FLOW.O2 O2 DELIVERY SYS: ABNORMAL
GAS FLOW.O2 O2 DELIVERY SYS: ABNORMAL
GAS FLOW.O2 SETTING OXYMISER: 16 BPM
GAS FLOW.O2 SETTING OXYMISER: 16 BPM
GLOBULIN SER CALC-MCNC: 1.4 G/DL (ref 2–4)
GLOBULIN SER CALC-MCNC: 2.9 G/DL (ref 2–4)
GLUCOSE BLD STRIP.AUTO-MCNC: 150 MG/DL (ref 70–110)
GLUCOSE BLD STRIP.AUTO-MCNC: 186 MG/DL (ref 70–110)
GLUCOSE SERPL-MCNC: 109 MG/DL (ref 74–99)
GLUCOSE SERPL-MCNC: 159 MG/DL (ref 74–99)
GLUCOSE SERPL-MCNC: 163 MG/DL (ref 74–99)
GLUCOSE SERPL-MCNC: 173 MG/DL (ref 65–100)
GLUCOSE SERPL-MCNC: 187 MG/DL (ref 65–100)
HCO3 BLD-SCNC: 14.1 MMOL/L (ref 22–26)
HCO3 BLD-SCNC: 14.6 MMOL/L (ref 22–26)
HCT VFR BLD AUTO: 11.8 % (ref 35–45)
HCT VFR BLD AUTO: 14.1 % (ref 35–45)
HCT VFR BLD AUTO: 14.4 % (ref 35–45)
HCT VFR BLD AUTO: 19.2 % (ref 35–45)
HCT VFR BLD AUTO: 21.6 % (ref 35–47)
HCT VFR BLD AUTO: 25.8 % (ref 35–47)
HGB BLD-MCNC: 3.9 G/DL (ref 12–16)
HGB BLD-MCNC: 4.9 G/DL (ref 12–16)
HGB BLD-MCNC: 4.9 G/DL (ref 12–16)
HGB BLD-MCNC: 6.7 G/DL (ref 12–16)
HGB BLD-MCNC: 7.7 G/DL (ref 11.5–16)
HGB BLD-MCNC: 8.7 G/DL (ref 11.5–16)
HISTORY CHECK: NORMAL
IMM GRANULOCYTES # BLD AUTO: 0 K/UL
IMM GRANULOCYTES NFR BLD AUTO: 0 %
INR PPP: 1.2 (ref 0.9–1.1)
INR PPP: 1.3 (ref 0.9–1.1)
INR PPP: 1.3 (ref 0.9–1.1)
INR PPP: 1.8 (ref 0.9–1.1)
INSPIRATION.DURATION SETTING TIME VENT: 0.9 SEC
LACTATE SERPL-SCNC: 1.7 MMOL/L (ref 0.4–2)
LACTATE SERPL-SCNC: 5.2 MMOL/L (ref 0.4–2)
LACTATE SERPL-SCNC: 5.9 MMOL/L (ref 0.4–2)
LYMPHOCYTES # BLD: 0.3 K/UL (ref 0.9–3.6)
LYMPHOCYTES # BLD: 0.7 K/UL (ref 0.8–3.5)
LYMPHOCYTES # BLD: 0.7 K/UL (ref 0.9–3.6)
LYMPHOCYTES NFR BLD: 4 % (ref 21–52)
LYMPHOCYTES NFR BLD: 5 % (ref 12–49)
LYMPHOCYTES NFR BLD: 5 % (ref 21–52)
MAGNESIUM SERPL-MCNC: 1.3 MG/DL (ref 1.6–2.6)
MAGNESIUM SERPL-MCNC: 2.3 MG/DL (ref 1.6–2.6)
MCH RBC QN AUTO: 30.7 PG (ref 26–34)
MCH RBC QN AUTO: 31.3 PG (ref 24–34)
MCH RBC QN AUTO: 31.4 PG (ref 26–34)
MCH RBC QN AUTO: 31.8 PG (ref 24–34)
MCH RBC QN AUTO: 32.5 PG (ref 24–34)
MCHC RBC AUTO-ENTMCNC: 33.7 G/DL (ref 30–36.5)
MCHC RBC AUTO-ENTMCNC: 34 G/DL (ref 31–37)
MCHC RBC AUTO-ENTMCNC: 34.8 G/DL (ref 31–37)
MCHC RBC AUTO-ENTMCNC: 34.9 G/DL (ref 31–37)
MCHC RBC AUTO-ENTMCNC: 35.6 G/DL (ref 30–36.5)
MCV RBC AUTO: 88.2 FL (ref 80–99)
MCV RBC AUTO: 89.7 FL (ref 78–100)
MCV RBC AUTO: 91.2 FL (ref 80–99)
MCV RBC AUTO: 93.4 FL (ref 78–100)
MCV RBC AUTO: 93.5 FL (ref 78–100)
METAMYELOCYTES NFR BLD MANUAL: 1 %
METAMYELOCYTES NFR BLD MANUAL: 1 %
MONOCYTES # BLD: 0 K/UL (ref 0–1)
MONOCYTES # BLD: 0.1 K/UL (ref 0.05–1.2)
MONOCYTES # BLD: 0.4 K/UL (ref 0.05–1.2)
MONOCYTES NFR BLD: 0 % (ref 5–13)
MONOCYTES NFR BLD: 2 % (ref 3–10)
MONOCYTES NFR BLD: 3 % (ref 3–10)
MYELOCYTES NFR BLD MANUAL: 1 %
MYELOCYTES NFR BLD MANUAL: 2 %
NEUTS BAND NFR BLD MANUAL: 14 % (ref 0–5)
NEUTS BAND NFR BLD MANUAL: 3 % (ref 0–5)
NEUTS BAND NFR BLD MANUAL: 5 % (ref 0–6)
NEUTS SEG # BLD: 12.7 K/UL (ref 1.8–8)
NEUTS SEG # BLD: 13.1 K/UL (ref 1.8–8)
NEUTS SEG # BLD: 6.7 K/UL (ref 1.8–8)
NEUTS SEG NFR BLD: 75 % (ref 40–73)
NEUTS SEG NFR BLD: 89 % (ref 40–73)
NEUTS SEG NFR BLD: 90 % (ref 32–75)
NRBC # BLD: 0.02 K/UL (ref 0–0.01)
NRBC # BLD: 0.02 K/UL (ref 0–0.01)
NRBC # BLD: 0.05 K/UL (ref 0–0.01)
NRBC # BLD: 0.05 K/UL (ref 0–0.01)
NRBC # BLD: 0.06 K/UL (ref 0–0.01)
NRBC BLD-RTO: 0.3 PER 100 WBC
NRBC BLD-RTO: 0.3 PER 100 WBC
NRBC BLD-RTO: 0.4 PER 100 WBC
O2/TOTAL GAS SETTING VFR VENT: 100 %
O2/TOTAL GAS SETTING VFR VENT: 60 %
PCO2 BLD: 29.6 MMHG (ref 35–45)
PCO2 BLD: 33.4 MMHG (ref 35–45)
PEEP RESPIRATORY: 5 CMH2O
PEEP RESPIRATORY: 5 CMH2O
PH BLD: 7.25 (ref 7.35–7.45)
PH BLD: 7.29 (ref 7.35–7.45)
PHOSPHATE SERPL-MCNC: 5.1 MG/DL (ref 2.5–4.9)
PHOSPHATE SERPL-MCNC: 8.2 MG/DL (ref 2.5–4.9)
PLATELET # BLD AUTO: 144 K/UL (ref 150–400)
PLATELET # BLD AUTO: 150 K/UL (ref 150–400)
PLATELET # BLD AUTO: 169 K/UL (ref 135–420)
PLATELET # BLD AUTO: 188 K/UL (ref 135–420)
PLATELET # BLD AUTO: 225 K/UL (ref 135–420)
PLATELET COMMENT: ABNORMAL
PLATELET COMMENT: ABNORMAL
PMV BLD AUTO: 10.5 FL (ref 9.2–11.8)
PMV BLD AUTO: 10.7 FL (ref 8.9–12.9)
PMV BLD AUTO: 10.7 FL (ref 9.2–11.8)
PMV BLD AUTO: 10.8 FL (ref 9.2–11.8)
PMV BLD AUTO: 11.2 FL (ref 8.9–12.9)
PO2 BLD: 422 MMHG (ref 80–100)
PO2 BLD: 578 MMHG (ref 80–100)
POTASSIUM SERPL-SCNC: 4.4 MMOL/L (ref 3.5–5.5)
POTASSIUM SERPL-SCNC: 5.1 MMOL/L (ref 3.5–5.5)
POTASSIUM SERPL-SCNC: 5.5 MMOL/L (ref 3.5–5.1)
POTASSIUM SERPL-SCNC: 5.7 MMOL/L (ref 3.5–5.1)
POTASSIUM SERPL-SCNC: 5.9 MMOL/L (ref 3.5–5.5)
PROT SERPL-MCNC: 2.4 G/DL (ref 6.4–8.2)
PROT SERPL-MCNC: 5.1 G/DL (ref 6.4–8.2)
PROTHROMBIN TIME: 12.6 SEC (ref 9–11.1)
PROTHROMBIN TIME: 13 SEC (ref 9–11.1)
PROTHROMBIN TIME: 16.7 SEC (ref 11.9–14.7)
PROTHROMBIN TIME: 21.1 SEC (ref 11.9–14.7)
RBC # BLD AUTO: 1.51 M/UL (ref 4.2–5.3)
RBC # BLD AUTO: 1.54 M/UL (ref 4.2–5.3)
RBC # BLD AUTO: 2.14 M/UL (ref 4.2–5.3)
RBC # BLD AUTO: 2.45 M/UL (ref 3.8–5.2)
RBC # BLD AUTO: 2.83 M/UL (ref 3.8–5.2)
RBC MORPH BLD: ABNORMAL
SAO2 % BLD: 100 % (ref 92–97)
SAO2 % BLD: 100 % (ref 92–97)
SERVICE CMNT-IMP: ABNORMAL
SODIUM SERPL-SCNC: 126 MMOL/L (ref 136–145)
SODIUM SERPL-SCNC: 127 MMOL/L (ref 136–145)
SODIUM SERPL-SCNC: 128 MMOL/L (ref 136–145)
SODIUM SERPL-SCNC: 128 MMOL/L (ref 136–145)
SODIUM SERPL-SCNC: 144 MMOL/L (ref 136–145)
SPECIMEN HOLD: NORMAL
SPECIMEN HOLD: NORMAL
SPECIMEN TYPE: ABNORMAL
SPECIMEN TYPE: ABNORMAL
TROPONIN I SERPL HS-MCNC: 188 NG/L (ref 0–54)
UNIT DIVISION: 0
UNIT DIVISION: 0
VENTILATION MODE VENT: ABNORMAL
VENTILATION MODE VENT: ABNORMAL
VT SETTING VENT: 400 ML
VT SETTING VENT: 400 ML
WBC # BLD AUTO: 13.8 K/UL (ref 3.6–11)
WBC # BLD AUTO: 14.1 K/UL (ref 3.6–11)
WBC # BLD AUTO: 14.3 K/UL (ref 4.6–13.2)
WBC # BLD AUTO: 7.3 K/UL (ref 4.6–13.2)
WBC # BLD AUTO: 7.9 K/UL (ref 4.6–13.2)
WBC MORPH BLD: ABNORMAL
WBC MORPH BLD: ABNORMAL

## 2024-04-09 PROCEDURE — 94002 VENT MGMT INPAT INIT DAY: CPT

## 2024-04-09 PROCEDURE — 6370000000 HC RX 637 (ALT 250 FOR IP): Performed by: INTERNAL MEDICINE

## 2024-04-09 PROCEDURE — 82803 BLOOD GASES ANY COMBINATION: CPT

## 2024-04-09 PROCEDURE — 6360000002 HC RX W HCPCS: Performed by: HOSPITALIST

## 2024-04-09 PROCEDURE — 85610 PROTHROMBIN TIME: CPT

## 2024-04-09 PROCEDURE — 82962 GLUCOSE BLOOD TEST: CPT

## 2024-04-09 PROCEDURE — 5A1955Z RESPIRATORY VENTILATION, GREATER THAN 96 CONSECUTIVE HOURS: ICD-10-PCS | Performed by: INTERNAL MEDICINE

## 2024-04-09 PROCEDURE — P9059 PLASMA, FRZ BETWEEN 8-24HOUR: HCPCS

## 2024-04-09 PROCEDURE — 83605 ASSAY OF LACTIC ACID: CPT

## 2024-04-09 PROCEDURE — 2500000003 HC RX 250 WO HCPCS: Performed by: NURSE PRACTITIONER

## 2024-04-09 PROCEDURE — 31500 INSERT EMERGENCY AIRWAY: CPT

## 2024-04-09 PROCEDURE — 6370000000 HC RX 637 (ALT 250 FOR IP): Performed by: EMERGENCY MEDICINE

## 2024-04-09 PROCEDURE — 86900 BLOOD TYPING SEROLOGIC ABO: CPT

## 2024-04-09 PROCEDURE — 94640 AIRWAY INHALATION TREATMENT: CPT

## 2024-04-09 PROCEDURE — 6370000000 HC RX 637 (ALT 250 FOR IP): Performed by: HOSPITALIST

## 2024-04-09 PROCEDURE — 86850 RBC ANTIBODY SCREEN: CPT

## 2024-04-09 PROCEDURE — 80053 COMPREHEN METABOLIC PANEL: CPT

## 2024-04-09 PROCEDURE — 2709999900 IR VASC EMBOLIZE OCCLUDE ARTERY

## 2024-04-09 PROCEDURE — 6360000002 HC RX W HCPCS: Performed by: STUDENT IN AN ORGANIZED HEALTH CARE EDUCATION/TRAINING PROGRAM

## 2024-04-09 PROCEDURE — 2500000003 HC RX 250 WO HCPCS: Performed by: NURSE ANESTHETIST, CERTIFIED REGISTERED

## 2024-04-09 PROCEDURE — 85025 COMPLETE CBC W/AUTO DIFF WBC: CPT

## 2024-04-09 PROCEDURE — 6360000002 HC RX W HCPCS: Performed by: NURSE ANESTHETIST, CERTIFIED REGISTERED

## 2024-04-09 PROCEDURE — 71250 CT THORAX DX C-: CPT

## 2024-04-09 PROCEDURE — 2000000000 HC ICU R&B

## 2024-04-09 PROCEDURE — 2580000003 HC RX 258: Performed by: INTERNAL MEDICINE

## 2024-04-09 PROCEDURE — 36415 COLL VENOUS BLD VENIPUNCTURE: CPT

## 2024-04-09 PROCEDURE — 6360000004 HC RX CONTRAST MEDICATION: Performed by: INTERNAL MEDICINE

## 2024-04-09 PROCEDURE — 83735 ASSAY OF MAGNESIUM: CPT

## 2024-04-09 PROCEDURE — 86901 BLOOD TYPING SEROLOGIC RH(D): CPT

## 2024-04-09 PROCEDURE — 84295 ASSAY OF SERUM SODIUM: CPT

## 2024-04-09 PROCEDURE — 85018 HEMOGLOBIN: CPT

## 2024-04-09 PROCEDURE — 71045 X-RAY EXAM CHEST 1 VIEW: CPT

## 2024-04-09 PROCEDURE — 93321 DOPPLER ECHO F-UP/LMTD STD: CPT

## 2024-04-09 PROCEDURE — 2500000003 HC RX 250 WO HCPCS: Performed by: STUDENT IN AN ORGANIZED HEALTH CARE EDUCATION/TRAINING PROGRAM

## 2024-04-09 PROCEDURE — 84484 ASSAY OF TROPONIN QUANT: CPT

## 2024-04-09 PROCEDURE — 74018 RADEX ABDOMEN 1 VIEW: CPT

## 2024-04-09 PROCEDURE — 85014 HEMATOCRIT: CPT

## 2024-04-09 PROCEDURE — 2500000003 HC RX 250 WO HCPCS: Performed by: INTERNAL MEDICINE

## 2024-04-09 PROCEDURE — 82330 ASSAY OF CALCIUM: CPT

## 2024-04-09 PROCEDURE — 31500 INSERT EMERGENCY AIRWAY: CPT | Performed by: NURSE ANESTHETIST, CERTIFIED REGISTERED

## 2024-04-09 PROCEDURE — 84132 ASSAY OF SERUM POTASSIUM: CPT

## 2024-04-09 PROCEDURE — 0BH17EZ INSERTION OF ENDOTRACHEAL AIRWAY INTO TRACHEA, VIA NATURAL OR ARTIFICIAL OPENING: ICD-10-PCS | Performed by: INTERNAL MEDICINE

## 2024-04-09 PROCEDURE — 05HM33Z INSERTION OF INFUSION DEVICE INTO RIGHT INTERNAL JUGULAR VEIN, PERCUTANEOUS APPROACH: ICD-10-PCS | Performed by: INTERNAL MEDICINE

## 2024-04-09 PROCEDURE — 04L23DZ OCCLUSION OF GASTRIC ARTERY WITH INTRALUMINAL DEVICE, PERCUTANEOUS APPROACH: ICD-10-PCS | Performed by: STUDENT IN AN ORGANIZED HEALTH CARE EDUCATION/TRAINING PROGRAM

## 2024-04-09 PROCEDURE — 2700000000 HC OXYGEN THERAPY PER DAY

## 2024-04-09 PROCEDURE — 82947 ASSAY GLUCOSE BLOOD QUANT: CPT

## 2024-04-09 PROCEDURE — 85027 COMPLETE CBC AUTOMATED: CPT

## 2024-04-09 PROCEDURE — 70450 CT HEAD/BRAIN W/O DYE: CPT

## 2024-04-09 PROCEDURE — 80048 BASIC METABOLIC PNL TOTAL CA: CPT

## 2024-04-09 PROCEDURE — 3E033XZ INTRODUCTION OF VASOPRESSOR INTO PERIPHERAL VEIN, PERCUTANEOUS APPROACH: ICD-10-PCS | Performed by: INTERNAL MEDICINE

## 2024-04-09 PROCEDURE — 6360000002 HC RX W HCPCS: Performed by: INTERNAL MEDICINE

## 2024-04-09 PROCEDURE — 99285 EMERGENCY DEPT VISIT HI MDM: CPT

## 2024-04-09 PROCEDURE — A4216 STERILE WATER/SALINE, 10 ML: HCPCS | Performed by: NURSE PRACTITIONER

## 2024-04-09 PROCEDURE — 74174 CTA ABD&PLVS W/CONTRAST: CPT

## 2024-04-09 PROCEDURE — 85384 FIBRINOGEN ACTIVITY: CPT

## 2024-04-09 PROCEDURE — 84100 ASSAY OF PHOSPHORUS: CPT

## 2024-04-09 PROCEDURE — 86923 COMPATIBILITY TEST ELECTRIC: CPT

## 2024-04-09 PROCEDURE — 6360000004 HC RX CONTRAST MEDICATION: Performed by: STUDENT IN AN ORGANIZED HEALTH CARE EDUCATION/TRAINING PROGRAM

## 2024-04-09 PROCEDURE — 36430 TRANSFUSION BLD/BLD COMPNT: CPT

## 2024-04-09 PROCEDURE — 6360000002 HC RX W HCPCS: Performed by: NURSE PRACTITIONER

## 2024-04-09 PROCEDURE — 2580000003 HC RX 258: Performed by: NURSE PRACTITIONER

## 2024-04-09 PROCEDURE — 6370000000 HC RX 637 (ALT 250 FOR IP): Performed by: NURSE PRACTITIONER

## 2024-04-09 PROCEDURE — 5A1935Z RESPIRATORY VENTILATION, LESS THAN 24 CONSECUTIVE HOURS: ICD-10-PCS | Performed by: INTERNAL MEDICINE

## 2024-04-09 PROCEDURE — 30233L1 TRANSFUSION OF NONAUTOLOGOUS FRESH PLASMA INTO PERIPHERAL VEIN, PERCUTANEOUS APPROACH: ICD-10-PCS | Performed by: INTERNAL MEDICINE

## 2024-04-09 PROCEDURE — 6370000000 HC RX 637 (ALT 250 FOR IP): Performed by: STUDENT IN AN ORGANIZED HEALTH CARE EDUCATION/TRAINING PROGRAM

## 2024-04-09 PROCEDURE — 2580000003 HC RX 258: Performed by: STUDENT IN AN ORGANIZED HEALTH CARE EDUCATION/TRAINING PROGRAM

## 2024-04-09 PROCEDURE — 51702 INSERT TEMP BLADDER CATH: CPT

## 2024-04-09 PROCEDURE — P9016 RBC LEUKOCYTES REDUCED: HCPCS

## 2024-04-09 PROCEDURE — 2580000003 HC RX 258: Performed by: NURSE ANESTHETIST, CERTIFIED REGISTERED

## 2024-04-09 PROCEDURE — 36247 INS CATH ABD/L-EXT ART 3RD: CPT

## 2024-04-09 PROCEDURE — 30233N1 TRANSFUSION OF NONAUTOLOGOUS RED BLOOD CELLS INTO PERIPHERAL VEIN, PERCUTANEOUS APPROACH: ICD-10-PCS | Performed by: INTERNAL MEDICINE

## 2024-04-09 PROCEDURE — B30MZZZ PLAIN RADIOGRAPHY OF SPINAL ARTERIES: ICD-10-PCS | Performed by: STUDENT IN AN ORGANIZED HEALTH CARE EDUCATION/TRAINING PROGRAM

## 2024-04-09 PROCEDURE — B30NZZZ PLAIN RADIOGRAPHY OF OTHER UPPER ARTERIES: ICD-10-PCS | Performed by: STUDENT IN AN ORGANIZED HEALTH CARE EDUCATION/TRAINING PROGRAM

## 2024-04-09 PROCEDURE — 36600 WITHDRAWAL OF ARTERIAL BLOOD: CPT

## 2024-04-09 RX ORDER — CALCIUM GLUCONATE 20 MG/ML
2000 INJECTION, SOLUTION INTRAVENOUS ONCE
Status: COMPLETED | OUTPATIENT
Start: 2024-04-09 | End: 2024-04-09

## 2024-04-09 RX ORDER — CHLORHEXIDINE GLUCONATE ORAL RINSE 1.2 MG/ML
15 SOLUTION DENTAL 2 TIMES DAILY
Status: DISCONTINUED | OUTPATIENT
Start: 2024-04-09 | End: 2024-04-16

## 2024-04-09 RX ORDER — ACETAMINOPHEN 650 MG/1
650 SUPPOSITORY RECTAL EVERY 6 HOURS PRN
Status: DISCONTINUED | OUTPATIENT
Start: 2024-04-09 | End: 2024-04-23 | Stop reason: HOSPADM

## 2024-04-09 RX ORDER — MIDAZOLAM HYDROCHLORIDE 1 MG/ML
INJECTION INTRAMUSCULAR; INTRAVENOUS PRN
Status: DISCONTINUED | OUTPATIENT
Start: 2024-04-09 | End: 2024-04-09 | Stop reason: SDUPTHER

## 2024-04-09 RX ORDER — POLYETHYLENE GLYCOL 3350 17 G/17G
17 POWDER, FOR SOLUTION ORAL DAILY PRN
Status: DISCONTINUED | OUTPATIENT
Start: 2024-04-09 | End: 2024-04-23 | Stop reason: HOSPADM

## 2024-04-09 RX ORDER — NOREPINEPHRINE BITARTRATE 0.06 MG/ML
0-30 INJECTION, SOLUTION INTRAVENOUS CONTINUOUS
Status: DISCONTINUED | OUTPATIENT
Start: 2024-04-09 | End: 2024-04-09

## 2024-04-09 RX ORDER — FENTANYL CITRATE-0.9 % NACL/PF 10 MCG/ML
25-200 PLASTIC BAG, INJECTION (ML) INTRAVENOUS CONTINUOUS
Status: DISCONTINUED | OUTPATIENT
Start: 2024-04-09 | End: 2024-04-09

## 2024-04-09 RX ORDER — PROPOFOL 10 MG/ML
5-50 INJECTION, EMULSION INTRAVENOUS CONTINUOUS
Status: DISCONTINUED | OUTPATIENT
Start: 2024-04-09 | End: 2024-04-14

## 2024-04-09 RX ORDER — MIDAZOLAM HYDROCHLORIDE 2 MG/2ML
1 INJECTION, SOLUTION INTRAMUSCULAR; INTRAVENOUS ONCE
Status: COMPLETED | OUTPATIENT
Start: 2024-04-09 | End: 2024-04-09

## 2024-04-09 RX ORDER — FENTANYL CITRATE-0.9 % NACL/PF 10 MCG/ML
25-200 PLASTIC BAG, INJECTION (ML) INTRAVENOUS CONTINUOUS
Status: DISCONTINUED | OUTPATIENT
Start: 2024-04-09 | End: 2024-04-09 | Stop reason: HOSPADM

## 2024-04-09 RX ORDER — SODIUM CHLORIDE 0.9 % (FLUSH) 0.9 %
5-40 SYRINGE (ML) INJECTION EVERY 12 HOURS SCHEDULED
Status: DISCONTINUED | OUTPATIENT
Start: 2024-04-09 | End: 2024-04-23 | Stop reason: HOSPADM

## 2024-04-09 RX ORDER — SODIUM CHLORIDE 9 MG/ML
INJECTION, SOLUTION INTRAVENOUS PRN
Status: DISCONTINUED | OUTPATIENT
Start: 2024-04-09 | End: 2024-04-09 | Stop reason: HOSPADM

## 2024-04-09 RX ORDER — PHENYLEPHRINE HCL IN 0.9% NACL 0.4MG/10ML
SYRINGE (ML) INTRAVENOUS PRN
Status: DISCONTINUED | OUTPATIENT
Start: 2024-04-09 | End: 2024-04-09 | Stop reason: SDUPTHER

## 2024-04-09 RX ORDER — MIDAZOLAM HYDROCHLORIDE 2 MG/2ML
1 INJECTION, SOLUTION INTRAMUSCULAR; INTRAVENOUS EVERY 4 HOURS PRN
Status: DISCONTINUED | OUTPATIENT
Start: 2024-04-09 | End: 2024-04-09 | Stop reason: HOSPADM

## 2024-04-09 RX ORDER — ONDANSETRON 2 MG/ML
4 INJECTION INTRAMUSCULAR; INTRAVENOUS EVERY 6 HOURS PRN
Status: DISCONTINUED | OUTPATIENT
Start: 2024-04-09 | End: 2024-04-23 | Stop reason: HOSPADM

## 2024-04-09 RX ORDER — SODIUM CHLORIDE, SODIUM LACTATE, POTASSIUM CHLORIDE, CALCIUM CHLORIDE 600; 310; 30; 20 MG/100ML; MG/100ML; MG/100ML; MG/100ML
INJECTION, SOLUTION INTRAVENOUS CONTINUOUS PRN
Status: DISCONTINUED | OUTPATIENT
Start: 2024-04-09 | End: 2024-04-09 | Stop reason: SDUPTHER

## 2024-04-09 RX ORDER — SODIUM CHLORIDE 9 MG/ML
INJECTION, SOLUTION INTRAVENOUS PRN
Status: DISCONTINUED | OUTPATIENT
Start: 2024-04-09 | End: 2024-04-23 | Stop reason: HOSPADM

## 2024-04-09 RX ORDER — GLYCOPYRROLATE 0.2 MG/ML
INJECTION INTRAMUSCULAR; INTRAVENOUS PRN
Status: DISCONTINUED | OUTPATIENT
Start: 2024-04-09 | End: 2024-04-09 | Stop reason: SDUPTHER

## 2024-04-09 RX ORDER — CALCIUM GLUCONATE 20 MG/ML
2000 INJECTION, SOLUTION INTRAVENOUS ONCE
Status: DISCONTINUED | OUTPATIENT
Start: 2024-04-09 | End: 2024-04-09 | Stop reason: HOSPADM

## 2024-04-09 RX ORDER — ONDANSETRON 4 MG/1
4 TABLET, ORALLY DISINTEGRATING ORAL EVERY 8 HOURS PRN
Status: DISCONTINUED | OUTPATIENT
Start: 2024-04-09 | End: 2024-04-23 | Stop reason: HOSPADM

## 2024-04-09 RX ORDER — ROCURONIUM BROMIDE 10 MG/ML
INJECTION, SOLUTION INTRAVENOUS PRN
Status: DISCONTINUED | OUTPATIENT
Start: 2024-04-09 | End: 2024-04-09 | Stop reason: HOSPADM

## 2024-04-09 RX ORDER — LIDOCAINE HYDROCHLORIDE 10 MG/ML
INJECTION, SOLUTION EPIDURAL; INFILTRATION; INTRACAUDAL; PERINEURAL PRN
Status: DISCONTINUED | OUTPATIENT
Start: 2024-04-09 | End: 2024-04-09 | Stop reason: HOSPADM

## 2024-04-09 RX ORDER — SODIUM CHLORIDE 9 MG/ML
INJECTION, SOLUTION INTRAVENOUS PRN
Status: CANCELLED | OUTPATIENT
Start: 2024-04-09

## 2024-04-09 RX ORDER — 0.9 % SODIUM CHLORIDE 0.9 %
INTRAVENOUS SOLUTION INTRAVENOUS PRN
Status: DISCONTINUED | OUTPATIENT
Start: 2024-04-09 | End: 2024-04-09 | Stop reason: SDUPTHER

## 2024-04-09 RX ORDER — SODIUM CHLORIDE 0.9 % (FLUSH) 0.9 %
5-40 SYRINGE (ML) INJECTION PRN
Status: DISCONTINUED | OUTPATIENT
Start: 2024-04-09 | End: 2024-04-23 | Stop reason: HOSPADM

## 2024-04-09 RX ORDER — CHLORHEXIDINE GLUCONATE ORAL RINSE 1.2 MG/ML
15 SOLUTION DENTAL 2 TIMES DAILY
Status: DISCONTINUED | OUTPATIENT
Start: 2024-04-09 | End: 2024-04-09 | Stop reason: HOSPADM

## 2024-04-09 RX ORDER — FENTANYL CITRATE 50 UG/ML
50 INJECTION, SOLUTION INTRAMUSCULAR; INTRAVENOUS EVERY 30 MIN PRN
Status: DISCONTINUED | OUTPATIENT
Start: 2024-04-09 | End: 2024-04-15

## 2024-04-09 RX ORDER — SODIUM CHLORIDE 9 MG/ML
INJECTION, SOLUTION INTRAVENOUS PRN
Status: DISCONTINUED | OUTPATIENT
Start: 2024-04-09 | End: 2024-04-09

## 2024-04-09 RX ORDER — NEOSTIGMINE METHYLSULFATE 1 MG/ML
INJECTION, SOLUTION INTRAVENOUS PRN
Status: DISCONTINUED | OUTPATIENT
Start: 2024-04-09 | End: 2024-04-09 | Stop reason: SDUPTHER

## 2024-04-09 RX ORDER — ACETAMINOPHEN 325 MG/1
650 TABLET ORAL EVERY 6 HOURS PRN
Status: DISCONTINUED | OUTPATIENT
Start: 2024-04-09 | End: 2024-04-23 | Stop reason: HOSPADM

## 2024-04-09 RX ORDER — MAGNESIUM SULFATE IN WATER 40 MG/ML
2000 INJECTION, SOLUTION INTRAVENOUS ONCE
Status: COMPLETED | OUTPATIENT
Start: 2024-04-09 | End: 2024-04-09

## 2024-04-09 RX ORDER — NOREPINEPHRINE BITARTRATE 0.06 MG/ML
.5-2 INJECTION, SOLUTION INTRAVENOUS CONTINUOUS
Status: DISPENSED | OUTPATIENT
Start: 2024-04-09 | End: 2024-04-11

## 2024-04-09 RX ORDER — EPHEDRINE SULFATE 50 MG/ML
INJECTION INTRAVENOUS PRN
Status: DISCONTINUED | OUTPATIENT
Start: 2024-04-09 | End: 2024-04-09 | Stop reason: SDUPTHER

## 2024-04-09 RX ORDER — CALCIUM CHLORIDE 100 MG/ML
1000 INJECTION INTRAVENOUS; INTRAVENTRICULAR PRN
Status: DISCONTINUED | OUTPATIENT
Start: 2024-04-09 | End: 2024-04-11

## 2024-04-09 RX ORDER — NOREPINEPHRINE BITARTRATE 0.06 MG/ML
1-30 INJECTION, SOLUTION INTRAVENOUS CONTINUOUS
Status: DISCONTINUED | OUTPATIENT
Start: 2024-04-09 | End: 2024-04-09 | Stop reason: HOSPADM

## 2024-04-09 RX ORDER — CALCIUM CHLORIDE 100 MG/ML
1000 INJECTION INTRAVENOUS; INTRAVENTRICULAR PRN
Status: DISCONTINUED | OUTPATIENT
Start: 2024-04-09 | End: 2024-04-09 | Stop reason: HOSPADM

## 2024-04-09 RX ORDER — ROCURONIUM BROMIDE 10 MG/ML
INJECTION, SOLUTION INTRAVENOUS PRN
Status: DISCONTINUED | OUTPATIENT
Start: 2024-04-09 | End: 2024-04-09 | Stop reason: SDUPTHER

## 2024-04-09 RX ADMIN — IPRATROPIUM BROMIDE AND ALBUTEROL SULFATE 1 DOSE: .5; 3 SOLUTION RESPIRATORY (INHALATION) at 07:21

## 2024-04-09 RX ADMIN — EPHEDRINE SULFATE 10 MG: 50 INJECTION INTRAVENOUS at 20:15

## 2024-04-09 RX ADMIN — PROPOFOL 20 MCG/KG/MIN: 10 INJECTION, EMULSION INTRAVENOUS at 20:42

## 2024-04-09 RX ADMIN — GLYCOPYRROLATE 0.2 MG: 0.2 INJECTION INTRAMUSCULAR; INTRAVENOUS at 19:52

## 2024-04-09 RX ADMIN — CALCIUM GLUCONATE 2000 MG: 20 INJECTION, SOLUTION INTRAVENOUS at 16:46

## 2024-04-09 RX ADMIN — ROCURONIUM BROMIDE 50 MG: 10 INJECTION, SOLUTION INTRAVENOUS at 09:40

## 2024-04-09 RX ADMIN — CHLORHEXIDINE GLUCONATE 15 ML: 1.2 RINSE ORAL at 22:03

## 2024-04-09 RX ADMIN — SODIUM BICARBONATE: 84 INJECTION, SOLUTION INTRAVENOUS at 21:58

## 2024-04-09 RX ADMIN — PHENYLEPHRINE HYDROCHLORIDE 40 MCG/MIN: 10 INJECTION INTRAVENOUS at 18:35

## 2024-04-09 RX ADMIN — SODIUM CHLORIDE 10 MG/HR: 9 INJECTION, SOLUTION INTRAVENOUS at 20:16

## 2024-04-09 RX ADMIN — FAMOTIDINE 20 MG: 10 INJECTION INTRAVENOUS at 22:03

## 2024-04-09 RX ADMIN — IPRATROPIUM BROMIDE AND ALBUTEROL SULFATE 1 DOSE: .5; 3 SOLUTION RESPIRATORY (INHALATION) at 15:16

## 2024-04-09 RX ADMIN — PROPOFOL 20 MCG/KG/MIN: 10 INJECTION, EMULSION INTRAVENOUS at 22:17

## 2024-04-09 RX ADMIN — NOREPINEPHRINE BITARTRATE 8 MCG/MIN: 1 INJECTION, SOLUTION, CONCENTRATE INTRAVENOUS at 18:14

## 2024-04-09 RX ADMIN — LIDOCAINE HYDROCHLORIDE 5 ML: 10 INJECTION, SOLUTION EPIDURAL; INFILTRATION; INTRACAUDAL; PERINEURAL at 19:43

## 2024-04-09 RX ADMIN — PROPOFOL 25 MCG/KG/MIN: 10 INJECTION, EMULSION INTRAVENOUS at 19:52

## 2024-04-09 RX ADMIN — CALCIUM GLUCONATE 2000 MG: 20 INJECTION, SOLUTION INTRAVENOUS at 10:48

## 2024-04-09 RX ADMIN — IOPAMIDOL 100 ML: 755 INJECTION, SOLUTION INTRAVENOUS at 15:04

## 2024-04-09 RX ADMIN — MIDAZOLAM 2 MG: 1 INJECTION INTRAMUSCULAR; INTRAVENOUS at 18:32

## 2024-04-09 RX ADMIN — BUDESONIDE 500 MCG: 0.5 INHALANT RESPIRATORY (INHALATION) at 07:21

## 2024-04-09 RX ADMIN — MAGNESIUM SULFATE HEPTAHYDRATE 2000 MG: 40 INJECTION, SOLUTION INTRAVENOUS at 12:58

## 2024-04-09 RX ADMIN — ROCURONIUM BROMIDE 20 MG: 10 SOLUTION INTRAVENOUS at 18:31

## 2024-04-09 RX ADMIN — Medication 80 MCG: at 20:05

## 2024-04-09 RX ADMIN — MIDAZOLAM HYDROCHLORIDE 1 MG: 1 INJECTION, SOLUTION INTRAMUSCULAR; INTRAVENOUS at 10:31

## 2024-04-09 RX ADMIN — EPHEDRINE SULFATE 10 MG: 50 INJECTION INTRAVENOUS at 20:12

## 2024-04-09 RX ADMIN — Medication 25 MCG/HR: at 10:32

## 2024-04-09 RX ADMIN — Medication 80 MCG: at 20:07

## 2024-04-09 RX ADMIN — IOPAMIDOL 162 ML: 755 INJECTION, SOLUTION INTRAVENOUS at 19:48

## 2024-04-09 RX ADMIN — Medication 80 MCG: at 20:10

## 2024-04-09 RX ADMIN — IPRATROPIUM BROMIDE AND ALBUTEROL SULFATE 1 DOSE: .5; 3 SOLUTION RESPIRATORY (INHALATION) at 12:13

## 2024-04-09 RX ADMIN — SODIUM CHLORIDE, POTASSIUM CHLORIDE, SODIUM LACTATE AND CALCIUM CHLORIDE: 600; 310; 30; 20 INJECTION, SOLUTION INTRAVENOUS at 18:33

## 2024-04-09 RX ADMIN — Medication 5 MCG/MIN: at 10:57

## 2024-04-09 RX ADMIN — NEOSTIGMINE METHYLSULFATE 2 MG: 1 INJECTION, SOLUTION INTRAVENOUS at 19:52

## 2024-04-09 RX ADMIN — PHYTONADIONE 10 MG: 10 INJECTION, EMULSION INTRAMUSCULAR; INTRAVENOUS; SUBCUTANEOUS at 13:22

## 2024-04-09 RX ADMIN — PANTOPRAZOLE SODIUM 40 MG: 40 TABLET, DELAYED RELEASE ORAL at 06:57

## 2024-04-09 RX ADMIN — FENTANYL CITRATE 50 MCG: 0.05 INJECTION, SOLUTION INTRAMUSCULAR; INTRAVENOUS at 20:39

## 2024-04-09 RX ADMIN — SODIUM CHLORIDE, PRESERVATIVE FREE 10 ML: 5 INJECTION INTRAVENOUS at 21:02

## 2024-04-09 RX ADMIN — SODIUM CHLORIDE 25 ML: 9 INJECTION, SOLUTION INTRAVENOUS at 18:34

## 2024-04-09 RX ADMIN — SODIUM CHLORIDE 7 MCG/MIN: 9 INJECTION, SOLUTION INTRAVENOUS at 21:59

## 2024-04-09 ASSESSMENT — PULMONARY FUNCTION TESTS
PIF_VALUE: 24
PIF_VALUE: 28
PIF_VALUE: 25
PIF_VALUE: 20

## 2024-04-09 ASSESSMENT — PAIN SCALES - GENERAL: PAINLEVEL_OUTOF10: 4

## 2024-04-09 NOTE — CARE COORDINATION
04/03/24 1520   Service Assessment   Patient Orientation Alert and Oriented   Cognition Alert   History Provided By Patient;Child/Family   Primary Caregiver Self   Support Systems Children;Family Members   PCP Verified by CM Yes   Last Visit to PCP Within last 3 months   Prior Functional Level Assistance with the following:;Mobility  (sometimes per patient)   Can patient return to prior living arrangement Yes   Ability to make needs known: Good   Family able to assist with home care needs: Yes  (plans to have her family assist with care at home and drive her home when discharged.)   Would you like for me to discuss the discharge plan with any other family members/significant others, and if so, who? Yes   Financial Resources Medicare   Community Resources None   Social/Functional History   Lives With Family   Type of Home House   Home Layout Two level   ADL Assistance Independent  (per patient)   Active  No   Occupation Retired   Discharge Planning   Type of Residence House   Living Arrangements Family Members   Current Services Prior To Admission Durable Medical Equipment   Current DME Prior to Arrival Walker;[facility]ane   Potential Assistance Needed Skilled Nursing Facility;Home Care   DME Ordered? No   Potential Assistance Purchasing Medications No   Type of Home Care Services OT;PT;Nursing Services  (potential)   Patient expects to be discharged to: House   One/Two Story Residence Two story   Services At/After Discharge   Transition of Care Consult (CM Consult) Discharge Planning   Condition of Participation: Discharge Planning   The Plan for Transition of Care is related to the following treatment goals: \"To get better\"   The Patient and/or Patient Representative was provided with a Choice of Provider? Patient   Freedom of Choice list was provided with basic dialogue that supports the patient's individualized plan of care/goals, treatment preferences, and shares the quality data associated with the providers?  
Chart Reviewed.   Noted patient transferred to the ICU this morning. CM will continue to follow up with patient and/or family for transition of care needs prn.   
Noted therapy recommendation. Patient not agreeable to d/c to SNF but agreed to have the SNF placement process initiated. CM followed up on SNF & HH choice. [facility] (1st choice) & [facility] chosen for SNF, and [facility] chosen for HH. Referral placed. CM will continue to follow.  
Case Management to discuss the discharge plan with any other family members/significant others, and if so, who?    Plans to Return to Present Housing:    Other Identified Issues/Barriers to RETURNING to current housing:   Potential Assistance needed at discharge: Home Care            Potential DME:    Patient expects to discharge to: House  Plan for transportation at discharge:      Financial    Payor: [employer] MEDICARE / Plan: [employer] GOLD PLUS HMO / Product Type: *No Product type* /     Does insurance require precert for SNF: No    Potential assistance Purchasing Medications: No  Meds-to-Beds request:        [facility] [place], [place] - [address] [facility] P [phone] - F [phone] [address] [facility] [address]  Phone: [phone] Fax: [phone]      Notes:    Factors facilitating achievement of predicted outcomes: Family support    Barriers to discharge: none    Additional Case Management Notes: pt resting, unable to discuss d/c planning with pt at this time, chart reviewed cm dept will cont to review case and meet with pt at another time.    The Plan for Transition of Care is related to the following treatment goals of Hypoxemia [R09.02]  Hyponatremia [E87.1]  Acute pulmonary edema (HCC) [J81.0]  Generalized weakness [R53.1]  Elevated brain natriuretic peptide (BNP) level [R79.89]  Pulmonary edema with left heart failure (HCC) [I50.1]    IF APPLICABLE: The Patient and/or patient representative Rozina and her family were provided with a choice of provider and agrees with the discharge plan. Freedom of choice list with basic dialogue that supports the patient's individualized plan of care/goals and shares the quality data associated with the providers was provided to:     Patient Representative Name:       The Patient and/or Patient Representative Agree with the Discharge Plan?      Jeanine Torres RN  Case Management Department

## 2024-04-09 NOTE — PALLIATIVE CARE
Met with patient's daughter Glenna and great granddaughter in the hallway after noticing anxiety and crying by family during \"code blue.\" Patient was intubated for airway protection due to decreased responsiveness and severe anemia with bradycardia, hypothermia, and hypotension. We offered support to the family during this most difficult time. Patient was seen transferring to the ICU. Family was updated by Dr Vivas. Family has expressed desire to continue all aggressive medical management including CPR in the event of cardiac arrest.  Explained to Dr. Vivas that we are not consulted on this patient but would be more than happy to be consulted to help assist family with goals of care decisions in the upcoming days.     MELISSA Oropeza - NP

## 2024-04-09 NOTE — PROCEDURES
Brief Postoperative Note    Rozina Doss  YOB: 1945  [id number]    Pre-operative Diagnosis: Retroperitoneal bleeding     Post-operative Diagnosis: Same    Procedure: Retroperitoneal angiogram and embolization.     Anesthesia: Local and general     Surgeons/Assistants: Briana Leyva MD    Estimated Blood Loss: Minimal     Complications: None    Specimens: Was Not Obtained    Findings:   Aortogram and selective angiograms of multiple right lumbar/retroperitoneal arteries demonstrated active bleeding. Embolization was performed with Gelfoam followed by multiple coils (1 mm x 2 cm Yuliana LP, 2 mm x 4 cm Yuliana LP, 15 cm packing). Final selective and nonselective angiograms demonstrated no further bleeding.       Electronically signed by Briana Leyva MD on 4/9/2024 at 7:51 PM

## 2024-04-09 NOTE — PLAN OF CARE
Problem: Discharge Planning  Goal: Discharge to home or other facility with appropriate resources  4/9/2024 1223 by Parisa Nicole RN  Outcome: Progressing  4/9/2024 0640 by Yuliana Prieto RN  Outcome: Progressing     Problem: Pain  Goal: Verbalizes/displays adequate comfort level or baseline comfort level  4/9/2024 1223 by Parisa Nicole RN  Outcome: Progressing  4/9/2024 0640 by Yuliana Prieto RN  Outcome: Progressing     Problem: ABCDS Injury Assessment  Goal: Absence of physical injury  4/9/2024 1223 by Parisa Nicole RN  Outcome: Progressing  4/9/2024 0640 by Yuliana Prieto RN  Outcome: Progressing     Problem: Skin/Tissue Integrity  Goal: Absence of new skin breakdown  Description: 1.  Monitor for areas of redness and/or skin breakdown  2.  Assess vascular access sites hourly  3.  Every 4-6 hours minimum:  Change oxygen saturation probe site  4.  Every 4-6 hours:  If on nasal continuous positive airway pressure, respiratory therapy assess nares and determine need for appliance change or resting period.  4/9/2024 1223 by Parisa Nicole RN  Outcome: Progressing  4/9/2024 0640 by Yuliana Prieto RN  Outcome: Progressing     Problem: Safety - Adult  Goal: Free from fall injury  4/9/2024 1223 by Parisa Nicole RN  Outcome: Progressing  4/9/2024 0640 by Yuliana Prieto RN  Outcome: Progressing     Problem: Chronic Conditions and Co-morbidities  Goal: Patient's chronic conditions and co-morbidity symptoms are monitored and maintained or improved  4/9/2024 1223 by Parisa Nicole RN  Outcome: Progressing  4/9/2024 0640 by Yuliana Prieto RN  Outcome: Progressing     Problem: Nutrition Deficit:  Goal: Optimize nutritional status  4/9/2024 1223 by Parisa Nicole RN  Outcome: Progressing  4/9/2024 0640 by Yuliana Prieto RN  Outcome: Progressing     Problem: Safety - Medical Restraint  Goal: Remains free of injury from restraints (Restraint for Interference with

## 2024-04-09 NOTE — ANESTHESIA PROCEDURE NOTES
Arterial Line:    An arterial line was placed using ultrasound guidance and surface landmarks, in the OR for the following indication(s): continuous blood pressure monitoring and blood sampling needed.    A 20 gauge (size) (length), Arrow (type) catheter was placed, Seldinger technique used, into the left radial artery, secured by Tegaderm.  Anesthesia type: General    Events:  greater than 3 attempts and patient tolerated procedure well with no complications.4/9/2024 6:30 PM4/9/2024 6:38 PM  Anesthesiologist: Tejinder Corado MD  Performed: Anesthesiologist   Preanesthetic Checklist  Completed: patient identified, IV checked, site marked, risks and benefits discussed, surgical/procedural consents, equipment checked, pre-op evaluation, timeout performed, anesthesia consent given, oxygen available, monitors applied/VS acknowledged and fire risk safety assessment completed and verbalized

## 2024-04-09 NOTE — ANESTHESIA PROCEDURE NOTES
Airway  Date/Time: 4/9/2024 9:40 AM  Urgency: emergent    Airway not difficult    General Information and Staff    Patient location during procedure: patient floor  Anesthesiologist: Chencho Gar MD  Resident/CRNA: Melissa Capps APRN - CRNA  Performed: resident/CRNA/CAA   Performed by: Melissa Capps APRN - CRNA  Authorized by: Melissa Capps APRN - CRNA      Consent for Airway (if performed for an anesthetic, see related documentation for consents)  Consent: The procedure was performed in an emergent situation. Verbal consent not obtained. Written consent not obtained.  Risks and benefits: risks, benefits and alternatives were not discussed      Code status verified:yes  Indications and Patient Condition  Indications for airway management: cardio/pulmonary arrest (code blue called, agonal respirations on anesthesia arrival)  Preoxygenated: yes  Patient position: sniffing  MILS not maintained throughout  Mask difficulty assessment: vent by bag mask (bag mask ventilation ongoing by RRT)    Final Airway Details  Final airway type: endotracheal airway      Successful airway: ETT     Successful intubation technique: video laryngoscopy  Facilitating devices/methods: intubating stylet  Endotracheal tube insertion site: oral  Blade: [place]  Blade size: #3  ETT size (mm): 7.0  Cormack-Lehane Classification: grade I - full view of glottis  Placement verified by: chest auscultation and capnometry   Measured from: lips  Number of attempts at approach: 1  Ventilation between attempts: bag mask    Additional Comments  Pt with ROSC prior to anesthesia arrival, bag mask ventilation ongoing per RRT, suction available, paralytic administered as documented, ETT placed easily via [place] #3 blade, positive change on colorimeter, +BBS.  Pt left in the care of the code team.  no

## 2024-04-09 NOTE — PROGRESS NOTES
Pt arrives via stretcher to angio department accompanied by [facility] flight team for emergent embolization procedure.

## 2024-04-09 NOTE — ANESTHESIA PRE PROCEDURE
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    Social History:    Social History     Tobacco Use   • Smoking status: Every Day   • Smokeless tobacco: Never   Substance Use Topics   • Alcohol use: Not on file                                Ready to quit: Not Answered  Counseling given: Not Answered      Vital Signs (Current):   Vitals:    04/09/24 1516 04/09/24 1526 04/09/24 1532 04/09/24 1630   BP:  (!) 145/46 (!) 163/50 (!) 135/42   Pulse: 57  55 59   Resp: 19  17 18   Temp:  (!) 94.6 °F (34.8 °C) (!) 94.6 °F (34.8 °C) (!) 95.5 °F (35.3 °C)   TempSrc:       SpO2: 100%  100% 100%   Weight:   78 kg (172 lb)    Height:   1.575 m (5' 2\")                                               BP Readings from Last 3 Encounters:   04/09/24 (!) 135/42   04/17/23 (!) 140/86       NPO Status:                                                                                 BMI:   Wt Readings from Last 3 Encounters:   04/09/24 78 kg (172 lb)   04/17/23 75.8 kg (167 lb)     Body mass index is 31.46 kg/m².    CBC:   Lab Results   Component Value Date/Time    WBC 14.3 04/09/2024 03:53 PM    RBC 2.14 04/09/2024 03:53 PM    HGB 6.7 04/09/2024 03:53 PM    HCT 19.2 04/09/2024 03:53 PM    MCV 89.7 04/09/2024 03:53 PM    RDW 14.6 04/09/2024 03:53 PM     04/09/2024 03:53 PM       CMP:   Lab Results

## 2024-04-09 NOTE — PLAN OF CARE

## 2024-04-10 LAB
ABO + RH BLD: NORMAL
ALBUMIN SERPL-MCNC: 1.9 G/DL (ref 3.5–5)
ALBUMIN/GLOB SERPL: 0.8 (ref 1.1–2.2)
ALP SERPL-CCNC: 77 U/L (ref 45–117)
ALT SERPL-CCNC: >3500 U/L (ref 12–78)
ANION GAP BLD CALC-SCNC: 6 (ref 10–20)
ANION GAP SERPL CALC-SCNC: 15 MMOL/L (ref 5–15)
AST SERPL-CCNC: >2000 U/L (ref 15–37)
BASE DEFICIT BLD-SCNC: 12.2 MMOL/L
BASOPHILS # BLD: 0 K/UL (ref 0–0.1)
BASOPHILS NFR BLD: 0 % (ref 0–1)
BILIRUB DIRECT SERPL-MCNC: 0.4 MG/DL (ref 0–0.2)
BILIRUB SERPL-MCNC: 0.9 MG/DL (ref 0.2–1)
BLD PROD TYP BPU: NORMAL
BLOOD BANK BLOOD PRODUCT EXPIRATION DATE: NORMAL
BLOOD BANK DISPENSE STATUS: NORMAL
BLOOD BANK ISBT PRODUCT BLOOD TYPE: 600
BLOOD BANK ISBT PRODUCT BLOOD TYPE: 6200
BLOOD BANK PRODUCT CODE: NORMAL
BLOOD BANK UNIT TYPE AND RH: NORMAL
BLOOD GROUP ANTIBODIES SERPL: NORMAL
BPU ID: NORMAL
BUN SERPL-MCNC: 88 MG/DL (ref 6–20)
BUN/CREAT SERPL: 21 (ref 12–20)
CA-I BLD-MCNC: 1.2 MMOL/L (ref 1.12–1.32)
CALCIUM SERPL-MCNC: 8.2 MG/DL (ref 8.5–10.1)
CALLED TO: NORMAL
CALLED TO:: NORMAL
CHLORIDE BLD-SCNC: 101 MMOL/L (ref 100–108)
CHLORIDE SERPL-SCNC: 97 MMOL/L (ref 97–108)
CO2 BLD-SCNC: 13 MMOL/L (ref 19–24)
CO2 SERPL-SCNC: 18 MMOL/L (ref 21–32)
CREAT SERPL-MCNC: 4.27 MG/DL (ref 0.55–1.02)
CROSSMATCH RESULT: NORMAL
DIFFERENTIAL METHOD BLD: ABNORMAL
EOSINOPHIL # BLD: 0 K/UL (ref 0–0.4)
EOSINOPHIL NFR BLD: 0 % (ref 0–7)
ERYTHROCYTE [DISTWIDTH] IN BLOOD BY AUTOMATED COUNT: 15.5 % (ref 11.5–14.5)
GLOBULIN SER CALC-MCNC: 2.4 G/DL (ref 2–4)
GLUCOSE BLD STRIP.AUTO-MCNC: 167 MG/DL (ref 74–106)
GLUCOSE BLD STRIP.AUTO-MCNC: 175 MG/DL (ref 65–117)
GLUCOSE BLD STRIP.AUTO-MCNC: 244 MG/DL (ref 65–117)
GLUCOSE SERPL-MCNC: 247 MG/DL (ref 65–100)
HCO3 BLDA-SCNC: 13 MMOL/L
HCT VFR BLD AUTO: 16.8 % (ref 35–47)
HCT VFR BLD AUTO: 19 % (ref 35–47)
HCT VFR BLD AUTO: 19.6 % (ref 35–47)
HCT VFR BLD AUTO: 19.8 % (ref 35–47)
HGB BLD-MCNC: 6.1 G/DL (ref 11.5–16)
HGB BLD-MCNC: 6.8 G/DL (ref 11.5–16)
HGB BLD-MCNC: 7.2 G/DL (ref 11.5–16)
HGB BLD-MCNC: 7.3 G/DL (ref 11.5–16)
HGB BLD-MCNC: 9.5 G/DL (ref 11.5–16)
HISTORY CHECK: NORMAL
HISTORY CHECK: NORMAL
IMM GRANULOCYTES # BLD AUTO: 0 K/UL
IMM GRANULOCYTES NFR BLD AUTO: 0 %
LACTATE BLD-SCNC: 2.72 MMOL/L (ref 0.4–2)
LYMPHOCYTES # BLD: 0.8 K/UL (ref 0.8–3.5)
LYMPHOCYTES NFR BLD: 5 % (ref 12–49)
MAGNESIUM SERPL-MCNC: 2.2 MG/DL (ref 1.6–2.4)
MCH RBC QN AUTO: 31.3 PG (ref 26–34)
MCHC RBC AUTO-ENTMCNC: 36.7 G/DL (ref 30–36.5)
MCV RBC AUTO: 85.2 FL (ref 80–99)
METAMYELOCYTES NFR BLD MANUAL: 2 %
MONOCYTES # BLD: 0.8 K/UL (ref 0–1)
MONOCYTES NFR BLD: 5 % (ref 5–13)
NEUTS BAND NFR BLD MANUAL: 10 % (ref 0–6)
NEUTS SEG # BLD: 13.2 K/UL (ref 1.8–8)
NEUTS SEG NFR BLD: 78 % (ref 32–75)
NRBC # BLD: 0.03 K/UL (ref 0–0.01)
NRBC BLD-RTO: 0.2 PER 100 WBC
PCO2 BLD: 28.3 MMHG (ref 35–45)
PH BLD: 7.28 (ref 7.35–7.45)
PHOSPHATE SERPL-MCNC: 8.4 MG/DL (ref 2.6–4.7)
PLATELET # BLD AUTO: 132 K/UL (ref 150–400)
PMV BLD AUTO: 11.4 FL (ref 8.9–12.9)
PO2 BLD: 173 MMHG (ref 80–100)
POTASSIUM SERPL-SCNC: 5.2 MMOL/L (ref 3.5–5.1)
PROT SERPL-MCNC: 4.3 G/DL (ref 6.4–8.2)
RBC # BLD AUTO: 2.3 M/UL (ref 3.8–5.2)
RBC MORPH BLD: ABNORMAL
SAO2 % BLD: 99 %
SERVICE CMNT-IMP: ABNORMAL
SERVICE CMNT-IMP: ABNORMAL
SODIUM BLD-SCNC: 120 MMOL/L (ref 136–145)
SODIUM SERPL-SCNC: 130 MMOL/L (ref 136–145)
SPECIMEN EXP DATE BLD: NORMAL
SPECIMEN SITE: ABNORMAL
UNIT DIVISION: 0
UNIT ISSUE DATE/TIME: NORMAL
WBC # BLD AUTO: 15 K/UL (ref 3.6–11)

## 2024-04-10 PROCEDURE — 5A1D90Z PERFORMANCE OF URINARY FILTRATION, CONTINUOUS, GREATER THAN 18 HOURS PER DAY: ICD-10-PCS | Performed by: INTERNAL MEDICINE

## 2024-04-10 PROCEDURE — 84100 ASSAY OF PHOSPHORUS: CPT

## 2024-04-10 PROCEDURE — 03HY32Z INSERTION OF MONITORING DEVICE INTO UPPER ARTERY, PERCUTANEOUS APPROACH: ICD-10-PCS | Performed by: INTERNAL MEDICINE

## 2024-04-10 PROCEDURE — 2500000003 HC RX 250 WO HCPCS: Performed by: STUDENT IN AN ORGANIZED HEALTH CARE EDUCATION/TRAINING PROGRAM

## 2024-04-10 PROCEDURE — 6360000002 HC RX W HCPCS: Performed by: NURSE PRACTITIONER

## 2024-04-10 PROCEDURE — P9047 ALBUMIN (HUMAN), 25%, 50ML: HCPCS | Performed by: NURSE PRACTITIONER

## 2024-04-10 PROCEDURE — 85025 COMPLETE CBC W/AUTO DIFF WBC: CPT

## 2024-04-10 PROCEDURE — P9016 RBC LEUKOCYTES REDUCED: HCPCS

## 2024-04-10 PROCEDURE — 6370000000 HC RX 637 (ALT 250 FOR IP): Performed by: STUDENT IN AN ORGANIZED HEALTH CARE EDUCATION/TRAINING PROGRAM

## 2024-04-10 PROCEDURE — A4216 STERILE WATER/SALINE, 10 ML: HCPCS | Performed by: NURSE PRACTITIONER

## 2024-04-10 PROCEDURE — 2580000003 HC RX 258: Performed by: INTERNAL MEDICINE

## 2024-04-10 PROCEDURE — 80069 RENAL FUNCTION PANEL: CPT

## 2024-04-10 PROCEDURE — 90945 DIALYSIS ONE EVALUATION: CPT

## 2024-04-10 PROCEDURE — 36430 TRANSFUSION BLD/BLD COMPNT: CPT

## 2024-04-10 PROCEDURE — 2500000003 HC RX 250 WO HCPCS: Performed by: NURSE PRACTITIONER

## 2024-04-10 PROCEDURE — 2000000000 HC ICU R&B

## 2024-04-10 PROCEDURE — 80048 BASIC METABOLIC PNL TOTAL CA: CPT

## 2024-04-10 PROCEDURE — 2580000003 HC RX 258: Performed by: NURSE PRACTITIONER

## 2024-04-10 PROCEDURE — 85014 HEMATOCRIT: CPT

## 2024-04-10 PROCEDURE — 80076 HEPATIC FUNCTION PANEL: CPT

## 2024-04-10 PROCEDURE — 83735 ASSAY OF MAGNESIUM: CPT

## 2024-04-10 PROCEDURE — 94002 VENT MGMT INPAT INIT DAY: CPT

## 2024-04-10 PROCEDURE — 36415 COLL VENOUS BLD VENIPUNCTURE: CPT

## 2024-04-10 PROCEDURE — 30233N1 TRANSFUSION OF NONAUTOLOGOUS RED BLOOD CELLS INTO PERIPHERAL VEIN, PERCUTANEOUS APPROACH: ICD-10-PCS | Performed by: INTERNAL MEDICINE

## 2024-04-10 PROCEDURE — 06HN33Z INSERTION OF INFUSION DEVICE INTO LEFT FEMORAL VEIN, PERCUTANEOUS APPROACH: ICD-10-PCS | Performed by: INTERNAL MEDICINE

## 2024-04-10 RX ORDER — FENTANYL CITRATE-0.9 % NACL/PF 10 MCG/ML
25-200 PLASTIC BAG, INJECTION (ML) INTRAVENOUS CONTINUOUS
Status: DISCONTINUED | OUTPATIENT
Start: 2024-04-10 | End: 2024-04-14

## 2024-04-10 RX ORDER — INSULIN LISPRO 100 [IU]/ML
0-8 INJECTION, SOLUTION INTRAVENOUS; SUBCUTANEOUS EVERY 6 HOURS
Status: DISCONTINUED | OUTPATIENT
Start: 2024-04-10 | End: 2024-04-23 | Stop reason: HOSPADM

## 2024-04-10 RX ORDER — CALCIUM CHLORIDE, MAGNESIUM CHLORIDE, DEXTROSE MONOHYDRATE, LACTIC ACID, SODIUM CHLORIDE, SODIUM BICARBONATE AND POTASSIUM CHLORIDE 3.68; 3.05; 22; 5.4; 6.46; 3.09; .314 G/L; G/L; G/L; G/L; G/L; G/L; G/L
INJECTION INTRAVENOUS CONTINUOUS
Status: DISCONTINUED | OUTPATIENT
Start: 2024-04-10 | End: 2024-04-15

## 2024-04-10 RX ORDER — ALBUMIN (HUMAN) 12.5 G/50ML
25 SOLUTION INTRAVENOUS EVERY 6 HOURS
Status: COMPLETED | OUTPATIENT
Start: 2024-04-10 | End: 2024-04-11

## 2024-04-10 RX ORDER — SODIUM CHLORIDE 9 MG/ML
INJECTION, SOLUTION INTRAVENOUS CONTINUOUS
Status: DISCONTINUED | OUTPATIENT
Start: 2024-04-10 | End: 2024-04-12

## 2024-04-10 RX ORDER — SODIUM CHLORIDE 9 MG/ML
INJECTION, SOLUTION INTRAVENOUS PRN
Status: DISCONTINUED | OUTPATIENT
Start: 2024-04-10 | End: 2024-04-23 | Stop reason: HOSPADM

## 2024-04-10 RX ORDER — DEXTROSE MONOHYDRATE 100 MG/ML
INJECTION, SOLUTION INTRAVENOUS CONTINUOUS PRN
Status: DISCONTINUED | OUTPATIENT
Start: 2024-04-10 | End: 2024-04-23 | Stop reason: HOSPADM

## 2024-04-10 RX ADMIN — INSULIN LISPRO 2 UNITS: 100 INJECTION, SOLUTION INTRAVENOUS; SUBCUTANEOUS at 13:32

## 2024-04-10 RX ADMIN — Medication 50 MCG/HR: at 16:02

## 2024-04-10 RX ADMIN — FENTANYL CITRATE 50 MCG: 0.05 INJECTION, SOLUTION INTRAMUSCULAR; INTRAVENOUS at 15:14

## 2024-04-10 RX ADMIN — CHLORHEXIDINE GLUCONATE 15 ML: 1.2 RINSE ORAL at 20:35

## 2024-04-10 RX ADMIN — SODIUM BICARBONATE: 84 INJECTION, SOLUTION INTRAVENOUS at 04:00

## 2024-04-10 RX ADMIN — CALCIUM CHLORIDE, MAGNESIUM CHLORIDE, DEXTROSE MONOHYDRATE, LACTIC ACID, SODIUM CHLORIDE, SODIUM BICARBONATE AND POTASSIUM CHLORIDE: 3.68; 3.05; 22; 5.4; 6.46; 3.09; .314 INJECTION INTRAVENOUS at 12:15

## 2024-04-10 RX ADMIN — ALBUMIN (HUMAN) 25 G: 0.25 INJECTION, SOLUTION INTRAVENOUS at 22:46

## 2024-04-10 RX ADMIN — CALCIUM CHLORIDE, MAGNESIUM CHLORIDE, DEXTROSE MONOHYDRATE, LACTIC ACID, SODIUM CHLORIDE, SODIUM BICARBONATE AND POTASSIUM CHLORIDE: 3.68; 3.05; 22; 5.4; 6.46; 3.09; .314 INJECTION INTRAVENOUS at 23:08

## 2024-04-10 RX ADMIN — CALCIUM CHLORIDE, MAGNESIUM CHLORIDE, DEXTROSE MONOHYDRATE, LACTIC ACID, SODIUM CHLORIDE, SODIUM BICARBONATE AND POTASSIUM CHLORIDE: 3.68; 3.05; 22; 5.4; 6.46; 3.09; .314 INJECTION INTRAVENOUS at 22:31

## 2024-04-10 RX ADMIN — ALBUMIN (HUMAN) 25 G: 0.25 INJECTION, SOLUTION INTRAVENOUS at 16:54

## 2024-04-10 RX ADMIN — ALBUMIN (HUMAN) 25 G: 0.25 INJECTION, SOLUTION INTRAVENOUS at 09:36

## 2024-04-10 RX ADMIN — FENTANYL CITRATE 50 MCG: 0.05 INJECTION, SOLUTION INTRAMUSCULAR; INTRAVENOUS at 21:23

## 2024-04-10 RX ADMIN — PROPOFOL 30 MCG/KG/MIN: 10 INJECTION, EMULSION INTRAVENOUS at 19:23

## 2024-04-10 RX ADMIN — FAMOTIDINE 20 MG: 10 INJECTION INTRAVENOUS at 09:37

## 2024-04-10 RX ADMIN — SODIUM CHLORIDE, PRESERVATIVE FREE 10 ML: 5 INJECTION INTRAVENOUS at 21:41

## 2024-04-10 RX ADMIN — CHLORHEXIDINE GLUCONATE 15 ML: 1.2 RINSE ORAL at 09:37

## 2024-04-10 RX ADMIN — ALBUMIN (HUMAN) 25 G: 0.25 INJECTION, SOLUTION INTRAVENOUS at 04:15

## 2024-04-10 RX ADMIN — SODIUM CHLORIDE, PRESERVATIVE FREE 10 ML: 5 INJECTION INTRAVENOUS at 09:33

## 2024-04-10 RX ADMIN — PROPOFOL 15 MCG/KG/MIN: 10 INJECTION, EMULSION INTRAVENOUS at 09:32

## 2024-04-10 ASSESSMENT — PULMONARY FUNCTION TESTS
PIF_VALUE: 31
PIF_VALUE: 28
PIF_VALUE: 29
PIF_VALUE: 31
PIF_VALUE: 31

## 2024-04-10 ASSESSMENT — PAIN DESCRIPTION - LOCATION: LOCATION: GENERALIZED

## 2024-04-10 ASSESSMENT — PAIN SCALES - GENERAL: PAINLEVEL_OUTOF10: 0

## 2024-04-10 NOTE — ANESTHESIA POSTPROCEDURE EVALUATION
Department of Anesthesiology  Postprocedure Note    Patient: Rozina Doss  MRN: [id number]  YOB: 1945  Date of evaluation: 4/9/2024    Procedure Summary       Date: 04/09/24 Room / Location: [facility] ANGIO IR    Anesthesia Start: 1814 Anesthesia Stop: 2022    Procedure: IR VASC EMBOLIZE OCCLUDE ARTERY Diagnosis: (GI Bleed)    Scheduled Providers: Shayla Hirsch DO Responsible Provider: Ramesh Miranda DO    Anesthesia Type: general ASA Status: 5 - Emergent            Anesthesia Type: No value filed.    Boni Phase I:      Boni Phase II:      Anesthesia Post Evaluation    Patient location during evaluation: ICU  Patient participation: complete - patient cannot participate  Level of consciousness: sedated and ventilated  Pain score: 0  Airway patency: patent  Cardiovascular status: blood pressure returned to baseline  Respiratory status: acceptable, intubated and ventilator  Hydration status: euvolemic  Comments: BP (!) 132/40   Pulse 66   Temp (!) 95.9 °F (35.5 °C) (Skin)   Resp 12   Ht 1.575 m (5' 2\")   Wt 78 kg (172 lb)   SpO2 100%   BMI 31.46 kg/m²       No notable events documented.

## 2024-04-10 NOTE — PROGRESS NOTES
Hospitalist Progress Note    Patient: Rozina Doss MRN: [id number]  CSN: [id number]    YOB: 1945  Age: 78 y.o.  Sex: female    DOA: 4/1/2024 LOS:  LOS: 7 days          Chief Complaint:    SOB      Assessment/Plan     78 years old presented with shortness of breath, weakness, congestion.    Day 7 hosp stay     Acute respiratory failure with hypoxia   Off bipap  Nosebleed now with 02  BiPAP as needed  Continue with oxygen by NC.   CXR with pulmonary edema     Repeat CXR today  Give one dose IV lasix  Add pulmicort nebs    4/6: Patient is off antibiotics now     CVS - Monitor HD.   HTN  - on coreg, nifedipine, hydralazine.  Hypertensive heart disease -  Echo with low normal LV systolic function, EF of 50-55%, grade I diastolic dsfunction.     Anemia -follow, transfuse for Hgb less than 7  Cbc daily     Acute on chronic kidney disease with hyponatremia  CKD stage 4  hyponatremia  Nephrology following.  Patient has urinary retention and required a straight cath    Davis for retention now     Monitor mental status  CT head negative for acute findings.  Pain in lower ext  MRI lumbosacral area showed left gluteus minimus tendon partial tear. Chronic mod compression deformity T12, spinal stenosis worst at L4-5.    Percocet to every 4 hours as needed     GI - regular diet.     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 
    Hospitalist Progress Note    Patient: Rozina Doss MRN: [id number]  CSN: [id number]    YOB: 1945  Age: 78 y.o.  Sex: female    DOA: 4/1/2024 LOS:  LOS: 8 days          Walked in to round on patient this am    Told temp is low this am  Bear hugger being procured  Blood ordered for low Hgb already    Ms Doss is barely responsive    Had low sumaya pulses    Ordered atropine    Not verbal to me, decreased responsiveness    Called for code blue    Atropine given    No CPR, she maintained pulses, and BP  Intubated for resp protection with unresponsiveness    Blood transfusing  Asked for IV to give volume/IVF additionally    No signs for bleeding seen    Reviewed labs    Updated daughter, transferred pt to ICU  Intensivist consult  Palliative consult      
    Hospitalist Progress Note    Patient: Rozina Doss MRN: [id number]  CSN: [id number]    YOB: 1945  Age: 78 y.o.  Sex: female    DOA: 4/1/2024 LOS:  LOS: 8 days          Chief Complaint:    unresponsive      Assessment/Plan       78 years old presented with shortness of breath, weakness, congestion.  Hx CHF, CKD, DM, HTN    Admitted to ICU for bipap initial part of hospitalization     Today Hgb has dropped acutely and she has decreased heart rate and responsiveness     Severe acute anemia  Chronic anemia  Hypothermia  bradycardia  AMS and unresponsiveness  Recent epistaxis, none noted last night or overnight  Acute on chronic diastolic CHF  Debility and weakness  CKD stage 4  Hyponatremia  Metabolic acidosis  Severe iron deficiency, has had IV iron course     Repeat CXR 4/8 was ok  Gave one dose IV lasix 4/8  pulmicort nebs     Has had course of antibiotics     HTN  - on coreg, nifedipine, hydralazine.  Hypertensive heart disease   Echo with low normal LV systolic function, EF of 50-55%, grade I diastolic dsfunction.     Acute on chronic kidney disease with hyponatremia  CKD stage 4  hyponatremia  Nephrology following.  Patient has urinary retention and balderrama in     CT head negative for acute findings.  Pain in lower ext  MRI lumbosacral area showed left gluteus minimus tendon partial tear. Chronic mod compression deformity T12, spinal stenosis worst at L4-5.    With bradycardia, hypothermia, decreased responsiveness and severe anemia-intubated in room 354 and transferred to ICU  Discussed with intensivist  Updated family on critical illness and results  CT chest/abd d/pelvis to look for source of severe blood loss additionally  Transfusing blood acutely  Check coags  Hydration/albumin  Replete electrolytes as needed  Balderrama mgmt  Hold BP meds  Pressor support as needed  Empiric IV abx for aspiration     No blood thinners    Critical illness    Full code    Guarded prognosis  Disposition 
  Hospitalist Progress Note    Patient: Rozina Doss MRN: [id number]  CSN: [id number]    YOB: 1945  Age: 78 y.o.  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    CRITICAL CARE PLAN    Resp -   Acute respiratory failure with hypoxia -  Requiring BiPAP support.  BiPAP as needed  Continue with oxygen by NC.   CXR with pulmonary edema     ID -   PCT normal  ANTIBIOTICS empiric ceftriaxone.       CVS - Monitor HD.   HTN  - on coreg, nifedipine, hydralazine.  Hypertensive heart disease -  Continue with lasix  Echo with low normal LV systolic function, EF of 50-55%, grade I diastolic dsfunction.    Heme/onc - Follow H&H, plts.   Anemia     Renal - Trend BUN, Cr, follow I/O. Check and replace Mg, K, phos.  CKD stage 4  hyponatremia  Nephrology following.    Endocrine -  Follow FSG    Neuro/ Pain/ Sedation -  Monitor mental status  CT head negative for acute findings.  Pain in lower ext  MRI lumbosacral area showed left gluteus minimus tendon partial tear. Chronic mod compression deformity T12, spinal stenosis worst at L4-5.    GI - regular diet.    Prophylaxis - DVT: heparin,      Disposition : TBD    Review of systems  General: No fevers or chills.  Cardiovascular: No chest pain or pressure. No palpitations.   Pulmonary: No shortness of breath.   Gastrointestinal: No nausea, vomiting.     Physical Exam:  General: Awake, cooperative, no acute distress    HEENT: NC, 
  Hospitalist Progress Note    Patient: Rozina Doss MRN: [id number]  CSN: [id number]    YOB: 1945  Age: 78 y.o.  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    CRITICAL CARE PLAN    Resp -   Acute respiratory failure with hypoxia -  Requiring BiPAP support.  BiPAP as needed  Continue with oxygen by NC.   CXR with pulmonary edema     ID -   PCT normal  ANTIBIOTICS empiric ceftriaxone.       CVS - Monitor HD.   HTN  - on coreg, nifedipine, hydralazine.  Hypertensive heart disease -  Continue with lasix  Echo with low normal LV systolic function, EF of 50-55%, grade I diastolic dsfunction.    Heme/onc - Follow H&H, plts.   Anemia     Renal - Trend BUN, Cr, follow I/O. Check and replace Mg, K, phos.  CKD stage 4  hyponatremia  Nephrology following.    Endocrine -  Follow FSG    Neuro/ Pain/ Sedation -  Monitor mental status  CT head negative for acute findings.  Pain in lower ext  MRI lumbosacral area showed left gluteus minimus tendon partial tear. Chronic mod compression deformity T12, spinal stenosis worst at L4-5.    GI - regular diet.    Prophylaxis - DVT: heparin,      Disposition : TBD    Review of systems  General: No fevers or chills.  Cardiovascular: No chest pain or pressure. No palpitations.   Pulmonary: No shortness of breath.   Gastrointestinal: No nausea, vomiting.     Physical Exam:  General: Awake, cooperative, no acute distress    HEENT: NC, 
  Hospitalist Progress Note    Patient: Rozina Doss MRN: [id number]  [facility]: [id number]    YOB: 1945  Age: 78 y.o.  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    CRITICAL CARE PLAN    Resp -   Acute respiratory failure with hypoxia -  Requiring BiPAP support.  BiPAP as needed  Continue with oxygen by NC.   CXR with pulmonary edema   4/6: Patient requires continuous oxygen.  Will repeat chest x-ray.  Echocardiogram showed normal ejection fraction.  Will add DuoNebs.  Will add incentive spirometry.    ID -   PCT normal  ANTIBIOTICS empiric ceftriaxone.   4/6: Patient is off antibiotics.    CVS - Monitor HD.   HTN  - on coreg, nifedipine, hydralazine.  Hypertensive heart disease -  Continue with lasix  Echo with low normal LV systolic function, EF of 50-55%, grade I diastolic dsfunction.    Heme/onc - Follow H&H, plts.   Anemia     Renal - Trend BUN, Cr, follow I/O. Check and replace Mg, K, phos.  CKD stage 4  hyponatremia  Nephrology following.  4/6: Creatinine is 3.51.  Nephrology is following.  Patient has urinary retention and required a straight cath this morning.    Endocrine -  Follow FSG    Neuro/ Pain/ Sedation -  Monitor mental status  CT head negative for acute findings.  Pain in lower ext  MRI lumbosacral area showed left gluteus minimus tendon partial tear. Chronic mod compression deformity T12, spinal stenosis worst at L4-5.  4/6: Continues to have left 
  Hospitalist Progress Note    Patient: Rozina Doss MRN: [id number]  [facility]: [id number]    YOB: 1945  Age: 78 y.o.  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    CRITICAL CARE PLAN    Resp -   Acute respiratory failure with hypoxia -  Requiring BiPAP support.  BiPAP as needed  Continue with oxygen by NC.   CXR with pulmonary edema     ID -   PCT normal  ANTIBIOTICS empiric ceftriaxone.       CVS - Monitor HD.   HTN  - on coreg, nifedipine, hydralazine.  Hypertensive heart disease -  Continue with lasix  Echo with low normal LV systolic function, EF of 50-55%, grade I diastolic dsfunction.    Heme/onc - Follow H&H, plts.   Anemia     Renal - Trend BUN, Cr, follow I/O. Check and replace Mg, K, phos.  CKD stage 4  hyponatremia  Nephrology following      Endocrine -  Follow FSG    Neuro/ Pain/ Sedation -  Monitor mental status  Numbness in lower ext, pulses normal  Discussed with Dr. Ubaldo haynes, will get MRI of lumbosacral area.    GI - regular diet.    Prophylaxis - DVT: heparin,      Disposition : TBD    Review of systems  General: No fevers or chills.  Cardiovascular: No chest pain or pressure. No palpitations.   Pulmonary: No shortness of breath.   Gastrointestinal: No nausea, vomiting.     Physical Exam:  General: Awake, cooperative, no acute distress    HEENT: NC, Atraumatic.  PERRLA, anicteric sclerae.  Lungs: CTA Bilaterally. No 
  Hospitalist Progress Note    Patient: Rozina Doss MRN: [id number]  [facility]: [id number]    YOB: 1945  Age: 78 y.o.  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    CRITICAL CARE PLAN    Resp -   Acute respiratory failure with hypoxia -  Requiring BiPAP support.  BiPAP as needed  Continue with oxygen by NC.   CXR with pulmonary edema   4/6: Patient requires continuous oxygen.  Will repeat chest x-ray.  Echocardiogram showed normal ejection fraction.  Will add DuoNebs.  Will add incentive spirometry.  4/7: Repeat chest x-ray yesterday showed atelectasis.  Patient will benefit from DuoNebs and incentive spirometry.    ID -   PCT normal  ANTIBIOTICS empiric ceftriaxone.   4/6: Patient is off antibiotics.    CVS - Monitor HD.   HTN  - on coreg, nifedipine, hydralazine.  Hypertensive heart disease -  Continue with lasix  Echo with low normal LV systolic function, EF of 50-55%, grade I diastolic dsfunction.    Heme/onc - Follow H&H, plts.   Anemia     Renal - Trend BUN, Cr, follow I/O. Check and replace Mg, K, phos.  CKD stage 4  hyponatremia  Nephrology following.  4/6: Creatinine is 3.51.  Nephrology is following.  Patient has urinary retention and required a straight cath this morning.    Endocrine -  Follow FSG    Neuro/ Pain/ Sedation -  Monitor mental status  CT head negative for acute findings.  Pain in lower ext  MRI lumbosacral area showed left gluteus minimus 
  Received a call from JUDD Boone ( [facility]), ETA for helicopter is 5-10 minutes. Please call report to the [facility] ED.   
 responded to a \"Code Blue' for Rozina Doss, who is a 78 y.o.,female.    Daughter in the family room very anxious and crying. She was getting off the elevator just when the code blue was called. She called her sister and brother who came along with a couple of grandchildren. Family was updated by the physician and moved to ICU. More family arrived and are now waiting for the chance to see her again once tests are completed.  Patient lives with son, daughter, and a granddaughter. Other daughter lives in [place].   Patient has received Sacrament of the Sick.     The  provided the following Interventions:  Initiated a relationship of care and support.   Provided Crisis Pastoral Care  Explored issues of malachi, belief, spirituality and Orthodoxy/ritual needs while hospitalized.  Listened empathically.  Offered prayer and assurance of continued prayers on patient's behalf.   Chart reviewed.    Plan:  Chaplains will continue to follow and will provide pastoral care on an as needed/requested basis.   recommends bedside caregivers page  on duty if patient shows signs of acute spiritual or emotional distress.    Chaplain Lianna Calderon M.Div. [facility], [facility]  Board Certified   Board Certified Clinical Ethicist  Certified Advance Care Planner  [phone] - Office    
0740 : Calling lab  to Add Iron and Ferritin - Specimens were not drawn. RN drawing labs now.     0815 : Spoke with MRI Tech -informing her that the patient states she does not remember answering questions on the MRI screening form. Will have to repeat screening form due to no signiture on the first attempt by previous nurse. Patient turned and repositioned and declined at this time to repeat MRI screening Form due to \"wanting to rest\". Will attempt at a later time.     0930 : Off the floor with patient for completion of their Stress Test.     1110 : CT of the Head completed.    1200 : Reassessment completed at this time. Patient is resting comfortably in bed. MRI screening Form completed.    1320 : Spoke with [facility] and tentative schedule is set for 1400.    1415 : Patient is off the floor for the completion of her MRI scan.     1600 : Reassessment completed; no changes noted. Critical care continues   
0741- Pt received from ED at this time.     0800- Shift assessment complete, pt resting comfortably in bed. Alert and oriented x4 and following commands appropriately. Currently Sinus tach with stable BP. Remains on BiPAP. Dual skin assessment complete, with ANNA Ivey RN     0820- Daughter at bedside, updated on pt condition and plan of care.     1030- Transitioned to 2L NC off of BiPAP per Dr. Finley. Respiratory therapist aware.     1710- Dr. Shukla at bedside.    1725- Pt with low grade temp, 100.4. Dr. Finley aware, PRN Tylenol given.     Shift report given to ANNA Ivey RN Report included the following information SBAR, Kardex, ED Summary, Procedure Summary, Intake/Output, MAR, and Recent Results        
0820 : Bladder scanned patient - Patient retaining over 800mL. Patient placed on a bed pan. Output on bedpan 10mL. Provider made aware. New orders are placed for Straight Cath and Urine culture.     Straight Cath completed and 900cc output.     1200 : Reassessment completed; no changes in patient's condition.     1330 : Attempted to complete MRI screening Form - Patient states \"too tired have Hyacinth do it\". MRI tech called and updated.     1415 : Dr. Leslie paged -- patient extremely lethargic -- Dr. Leslie cancelling transfer orders and wants to monitor patient more diligently in ICU.     1600 : Reassessment completed; MD at bedside. ABG is ordered. Nasal cannula placed on patient at 2L. Patient more alert than previously when speaking with Dr. Leslie.     1830 : Paged Dr. Beltran - Patient retaining over 400mL when bladder scanned performed. MD placed orders for Davis catheter.   
09: Entered chart to answer questions for daughter.  
1036 Received phone call from Yulissa in lab, she reports a critical lab value of ionized calcium 0.73. I reported this result to Bedside RN Rober and Dr. Finley. He states he will order calcium   
1232:  Cardiology cleared pt for transfer.    1430:  Telephone order by the hospitalist Dr. Leslie to transfer pt to telemetry.     1453:  Telephone order by Nephrology to remove balderrama, 2x a day bladder scan- straight cath if greater than 250cc.      1522:  Transfer handoff given to receiving RN  1700:  Pt transferred to 3N with cardiac monitor, 2L of O2, this RN, and belongings.   
1650  Wasted 68 ml of fentanyl with jos alfaro.  
Assumed care of patient from JUDD Dailey (off going nurse). Patient alert and oriented. No apparent distress noted. Patient offers no concerns and can verbalize needs. Assessment to follow. Call bell within reach. Bed in lowest, locked position.   
Awaiting updated therapy notes for auth to be started at [facility]. Patient and family agreeable with DC plan. SW to follow.   
Bladder scanned first reading 449 second reading 499 greater than 440 ml  
Bladimir Vega in the blood bank. Order for the second unit of FFP is not available. Order entered again and verified with  blood bank.   
Both PIV's are no longer working. Removed, tip intact. Medic called for assistance. Unable to place central line at this time due to INR 1.8.   
Cardiology Progress Note        Patient: Rozina Doss        Sex: female          DOA: 4/1/2024  YOB: 1945      Age:  78 y.o.        LOS:  LOS: 1 day   Assessment/Plan     Principal Problem:    Pulmonary edema with left heart failure (HCC)  Active Problems:    Acute respiratory failure with hypoxia (HCC)  Resolved Problems:    * No resolved hospital problems. *      Plan:  Blood pressure is improving  I will plan to do Lexiscan nuclear stress test tomorrow to exclude ischemic etiology of pulmonary edema/hypertensive heart disease                  Acute respiratory failure with hypoxemia  Pulmonary edema  Hypertensive heart disease  Uncontrolled hypertension  Chronic kidney disease  Obesity  Anemia due to chronic disease        Plan:  Optimize antihypertensive treatment  Continue with carvedilol, nifedipine, hydralazine  Continue with Lasix  Fluid restriction and salt restriction as per CHF protocol  Consider outpatient sleep study assessment  Patient will need stress test to exclude ischemic etiology of pulmonary edema since patient have longstanding history of smoking, hypertension, CKD  Management plan discussed in detail with the patient and daughter  Thank you for allowing me to participate in the management of this pleasant patient.  Please feel free to contact me if you have any questions or concerns.    Subjective:    cc:    Leg weakness and shortness of breath      REVIEW OF SYSTEMS:     General: No fevers or chills.  Cardiovascular: No chest pain or pressure. No palpitations. No ankle swelling  Pulmonary: No SOB, orthopnea, PND  Gastrointestinal: No nausea, vomiting or diarrhea      Objective:      Visit Vitals  BP (!) 135/44   Pulse 77   Temp 98.2 °F (36.8 °C) (Axillary)   Resp 18   Ht 1.575 m (5' 2\")   Wt 71.2 kg (157 lb)   SpO2 94%   BMI 28.72 kg/m²     Body mass index is 28.72 kg/m².    Physical Exam:  General Appearance: Comfortable, not using accessory muscles of 
Cardiology Progress Note        Patient: Rozina Doss        Sex: female          DOA: 4/1/2024  YOB: 1945      Age:  78 y.o.        LOS:  LOS: 2 days   Assessment/Plan     Principal Problem:    Pulmonary edema with left heart failure (HCC)  Active Problems:    Essential hypertension    ARIC (acute kidney injury) (HCC)    Type 2 diabetes mellitus (HCC)    CKD stage 3 due to type 2 diabetes mellitus (HCC)    Acute respiratory failure with hypoxia (HCC)    Anemia    Hyponatremia  Resolved Problems:    * No resolved hospital problems. *      Plan:  Cardiac telemetry sinus rhythm with occasional PACs  Stress test is negative for ischemia with a EF of 53%  Continue with current antihypertensive treatment  Discussed with patient  Discussed with patient's daughter        Blood pressure is improving  I will plan to do Lexiscan nuclear stress test tomorrow to exclude ischemic etiology of pulmonary edema/hypertensive heart disease                  Acute respiratory failure with hypoxemia  Pulmonary edema  Hypertensive heart disease  Uncontrolled hypertension  Chronic kidney disease  Obesity  Anemia due to chronic disease        Plan:  Optimize antihypertensive treatment  Continue with carvedilol, nifedipine, hydralazine  Continue with Lasix  Fluid restriction and salt restriction as per CHF protocol  Consider outpatient sleep study assessment  Patient will need stress test to exclude ischemic etiology of pulmonary edema since patient have longstanding history of smoking, hypertension, CKD  Management plan discussed in detail with the patient and daughter  Thank you for allowing me to participate in the management of this pleasant patient.  Please feel free to contact me if you have any questions or concerns.    Subjective:    cc:    Leg weakness and shortness of breath      REVIEW OF SYSTEMS:     General: No fevers or chills.  Cardiovascular: No chest pain or pressure. No palpitations. No ankle 
Cardiology Progress Note        Patient: Rozina Doss        Sex: female          DOA: 4/1/2024  YOB: 1945      Age:  78 y.o.        LOS:  LOS: 3 days   Assessment/Plan     Principal Problem:    Pulmonary edema with left heart failure (HCC)  Active Problems:    Essential hypertension    ARIC (acute kidney injury) (HCC)    Type 2 diabetes mellitus (HCC)    CKD stage 3 due to type 2 diabetes mellitus (HCC)    Acute respiratory failure with hypoxia (HCC)    Anemia    Hyponatremia  Resolved Problems:    * No resolved hospital problems. *      Plan:  No chest pain, hemodynamically stable  Cardiac telemetry sinus rhythm with occasional PACs  Stress test is negative for ischemia with a EF of 53%  Continue with current antihypertensive treatment  Discussed with patient  Discussed with patient's daughter        Blood pressure is improving  I will plan to do Lexiscan nuclear stress test tomorrow to exclude ischemic etiology of pulmonary edema/hypertensive heart disease                  Acute respiratory failure with hypoxemia  Pulmonary edema  Hypertensive heart disease  Uncontrolled hypertension  Chronic kidney disease  Obesity  Anemia due to chronic disease        Plan:  Optimize antihypertensive treatment  Continue with carvedilol, nifedipine, hydralazine  Continue with Lasix  Fluid restriction and salt restriction as per CHF protocol  Consider outpatient sleep study assessment  Patient will need stress test to exclude ischemic etiology of pulmonary edema since patient have longstanding history of smoking, hypertension, CKD  Management plan discussed in detail with the patient and daughter  Thank you for allowing me to participate in the management of this pleasant patient.  Please feel free to contact me if you have any questions or concerns.    Subjective:    cc:    Leg weakness and shortness of breath      REVIEW OF SYSTEMS:     General: No fevers or chills.  Cardiovascular: No chest pain or 
Cardiology Progress Note        Patient: Rozina Doss        Sex: female          DOA: 4/1/2024  YOB: 1945      Age:  78 y.o.        LOS:  LOS: 5 days   Assessment/Plan     Principal Problem:    Pulmonary edema with left heart failure (HCC)  Active Problems:    Essential hypertension    ARIC (acute kidney injury) (HCC)    Type 2 diabetes mellitus (HCC)    CKD stage 3 due to type 2 diabetes mellitus (HCC)    Acute respiratory failure with hypoxia (HCC)    Anemia    Hyponatremia  Resolved Problems:    * No resolved hospital problems. *      Plan:  4/6/2024  Generalized body aches and pains  Anxious  Possible mucous/postnasal drip  Lungs are clear  Cardiac telemetry stable  Patient's daughter in the room  Continue with current meds        4/5/2024  No cardiac complaints  Blood pressure stable  Hemodynamically stable  Patient can be transferred to cardiac floor  Discussed with RN  Continue with current meds  Discussed with patient      No chest pain, hemodynamically stable  Cardiac telemetry sinus rhythm with occasional PACs  Stress test is negative for ischemia with a EF of 53%  Continue with current antihypertensive treatment  Discussed with patient  Discussed with patient's daughter        Blood pressure is improving  I will plan to do Lexiscan nuclear stress test tomorrow to exclude ischemic etiology of pulmonary edema/hypertensive heart disease                  Acute respiratory failure with hypoxemia  Pulmonary edema  Hypertensive heart disease  Uncontrolled hypertension  Chronic kidney disease  Obesity  Anemia due to chronic disease        Plan:  Optimize antihypertensive treatment  Continue with carvedilol, nifedipine, hydralazine  Continue with Lasix  Fluid restriction and salt restriction as per CHF protocol  Consider outpatient sleep study assessment  Patient will need stress test to exclude ischemic etiology of pulmonary edema since patient have longstanding history of smoking, 
Cardiology Progress Note        Patient: Rozina Doss        Sex: female          DOA: 4/1/2024  YOB: 1945      Age:  78 y.o.        LOS:  LOS: 6 days   Assessment/Plan     Principal Problem:    Pulmonary edema with left heart failure (HCC)  Active Problems:    Essential hypertension    ARIC (acute kidney injury) (HCC)    Type 2 diabetes mellitus (HCC)    CKD stage 3 due to type 2 diabetes mellitus (HCC)    Acute respiratory failure with hypoxia (HCC)    Anemia    Hyponatremia  Resolved Problems:    * No resolved hospital problems. *      Plan:  4/7/2024  Stable cardiac telemetry  Blood pressure is stable  Clinically euvolemic patient  Multiple issues including generalized body aches and pains, hip pain and arthritis pains  Discussed with the patient and with her son  Continue with current cardiac meds.              4/6/2024  Generalized body aches and pains  Anxious  Possible mucous/postnasal drip  Lungs are clear  Cardiac telemetry stable  Patient's daughter in the room  Continue with current meds        4/5/2024  No cardiac complaints  Blood pressure stable  Hemodynamically stable  Patient can be transferred to cardiac floor  Discussed with RN  Continue with current meds  Discussed with patient      No chest pain, hemodynamically stable  Cardiac telemetry sinus rhythm with occasional PACs  Stress test is negative for ischemia with a EF of 53%  Continue with current antihypertensive treatment  Discussed with patient  Discussed with patient's daughter        Blood pressure is improving  I will plan to do Lexiscan nuclear stress test tomorrow to exclude ischemic etiology of pulmonary edema/hypertensive heart disease                  Acute respiratory failure with hypoxemia  Pulmonary edema  Hypertensive heart disease  Uncontrolled hypertension  Chronic kidney disease  Obesity  Anemia due to chronic disease        Plan:  Optimize antihypertensive treatment  Continue with carvedilol, 
Cardiology Progress Note        Patient: Rozina Doss        Sex: female          DOA: 4/1/2024  YOB: 1945      Age:  78 y.o.        LOS:  LOS: 7 days   Assessment/Plan     Principal Problem:    Pulmonary edema with left heart failure (HCC)  Active Problems:    Essential hypertension    ARIC (acute kidney injury) (HCC)    Type 2 diabetes mellitus (HCC)    CKD stage 3 due to type 2 diabetes mellitus (HCC)    Acute respiratory failure with hypoxia (HCC)    Anemia    Hyponatremia  Resolved Problems:    * No resolved hospital problems. *      Plan:  4/8/2024  No cardiac complaints  Cardiac telemetry stable  Continue with current meds  Discussed with patient's daughter in the room        4/7/2024  Stable cardiac telemetry  Blood pressure is stable  Clinically euvolemic patient  Multiple issues including generalized body aches and pains, hip pain and arthritis pains  Discussed with the patient and with her son  Continue with current cardiac meds.              4/6/2024  Generalized body aches and pains  Anxious  Possible mucous/postnasal drip  Lungs are clear  Cardiac telemetry stable  Patient's daughter in the room  Continue with current meds        4/5/2024  No cardiac complaints  Blood pressure stable  Hemodynamically stable  Patient can be transferred to cardiac floor  Discussed with RN  Continue with current meds  Discussed with patient      No chest pain, hemodynamically stable  Cardiac telemetry sinus rhythm with occasional PACs  Stress test is negative for ischemia with a EF of 53%  Continue with current antihypertensive treatment  Discussed with patient  Discussed with patient's daughter        Blood pressure is improving  I will plan to do Lexiscan nuclear stress test tomorrow to exclude ischemic etiology of pulmonary edema/hypertensive heart disease                  Acute respiratory failure with hypoxemia  Pulmonary edema  Hypertensive heart disease  Uncontrolled hypertension  Chronic 
Cardiology Progress Note        Patient: Rozina Doss        Sex: female          DOA: 4/1/2024  YOB: 1945      Age:  78 y.o.        LOS:  LOS: 8 days   Assessment/Plan     Principal Problem:    Pulmonary edema with left heart failure (HCC)  Active Problems:    Essential hypertension    ARIC (acute kidney injury) (HCC)    Type 2 diabetes mellitus (HCC)    CKD stage 3 due to type 2 diabetes mellitus (HCC)    Acute respiratory failure with hypoxia (HCC)    Anemia    Hyponatremia  Resolved Problems:    * No resolved hospital problems. *      Plan:  4/9/2024  Intubated  Severe anemia  Hypotension requiring pressors  Mild normal sinus bradycardia, TSH was normal  Continue with ventilatory support  Continue with blood transfusion  Discussed with RN.  I will get stat echocardiogram  I do not suspect primary cardiac event.  Will continue to monitor.            4/8/2024  No cardiac complaints  Cardiac telemetry stable  Continue with current meds  Discussed with patient's daughter in the room        4/7/2024  Stable cardiac telemetry  Blood pressure is stable  Clinically euvolemic patient  Multiple issues including generalized body aches and pains, hip pain and arthritis pains  Discussed with the patient and with her son  Continue with current cardiac meds.              4/6/2024  Generalized body aches and pains  Anxious  Possible mucous/postnasal drip  Lungs are clear  Cardiac telemetry stable  Patient's daughter in the room  Continue with current meds        4/5/2024  No cardiac complaints  Blood pressure stable  Hemodynamically stable  Patient can be transferred to cardiac floor  Discussed with RN  Continue with current meds  Discussed with patient      No chest pain, hemodynamically stable  Cardiac telemetry sinus rhythm with occasional PACs  Stress test is negative for ischemia with a EF of 53%  Continue with current antihypertensive treatment  Discussed with patient  Discussed with patient's 
Care  assisting Care Mgr BLADIMIR Resendiz with Discharge Planning needs for patient.  Updates sent to [facility].  Requested [facility] to start auth for patient.     Will follow for further needs.  
Consent obtained for blood transfusion by patient's daughter (Hyacinth).     1120 Transfusion of PRBC's completed. No s/s of transfusion reaction.     1352 FFP transfusion initiated.     1355 Transfusion of PRBC's initiated.    1412 Transfusion of FFP completed. No s/s of transfusion reaction.     1422 Vitamin K infused. Tolerated well.     1630 Transfusion of PRBC's completed. No s/s of transfusion reaction.     1526 FFP transfusion initiated.     1547 Transfusion of FFP completed. No s/s of transfusion reaction.     1630 Transfusion of PRBC's initiated.    1720 Transfusion of PRBC's completed. No s/s of transfusion reaction.     
Critical hgb 3.9 resulted. Patient is currently receiving PRBC's. Dr. Theodore Finley updated. Additional transfusion order obtained.   
Critical lactic acid 5.2. Dr Finley notified. Continue current plan of care.   
Davis cather discontinued per order. Pt due to void. Pt made aware to call for assistance when need to urinate. Will endorse to oncoming night shift nurse   
Discussed with [facility] on-call ICU NP through transfer center: Discussed the patient's condition including being on ventilator, hemorrhagic shock due to acute right retroperitoneal bleeding and hematoma requiring multiple PRBC, FFP, Levophed, IVF etc.  Patient has 2 midlines.  Patient is on fentanyl drip and Versed as needed.  Patient is accepted under the critical care intensivist Dr. Dawson Smalls.   Also discussed with IR Dr. Leyva to transfer center, who is on-call for [facility] IR procedure; agreed to perform the procedure once patient's arrival at [facility].  Transfer center is looking for bed at [facility] ICU; if bed is not available, then patient will be held in ER followed by IR procedure.  Discussed with RN and updated.  Discussed with Dr. Vivas and updated.  Transfer center will call ICU once arrangements are performed.  I have requested helicopter transfer.  I have discussed with patient's family members and updated with all above; also discussed the risk of transport and transfer including death etc.  Answered all questions to their satisfaction.    Total critical care time 2-hour and 30 minutes today.    Theodore Finley MD 4/9/2024 3:58 PM   
Dr. Finley notified of Co3 14.1. Defer to nephrology.   
Dr. Pritchard notified of Co3 14.1. Continue current plan of care.   
H&H was reported at 4.9/14.4, confirmed by repeating CBC.     No bleeding was reported.    Will transfuse one unit of PRBCs for now and repeat CBC.    
Labs (BMP, Electrolytes, CBC, INR) resulted and relayed to Dr. Finley. Patient UOP for this shift has been 75 mL. Crackles heard upon auscultation. Lifevac at bedside. Patient in process of being transferred to [facility] for IR intervention.     Fentayl- 68 mL wasted with JUDD Reeves  
MD notified of diastolic BP sustaining in the 40's. Patient asymptomatic. Per MD ok to still give PO hydralazine at this time.   
Medicare pt has received, reviewed, and signed 2nd IM letter informing them of their right to appeal the discharge.  Signed copy has been placed on pt bedside chart.    
No response to any stimulus. No cough/gag. Pupils round, equal, sluggish. Patient received paralytic for intubation. Will continue to monitor neuro status.   
Occupational Therapy  Patient with Code blue called this morning. Pt intubated and transferred to ICU. Will discontinue OT orders at this time. Please reorder when medically appropriate to participate with OT. Recommend PROM as needed to maintain joint mobility until patient able to actively participate.  Halie Funk OTR/L  
Palliative Medicine   [facility] [phone]  [facility] [phone]    Palliative team, Nakia Garay, MARQUEZ and this LMSW observed pt's family member being escorted to waiting area.  CODE BLUE had been called on pt.  Pt's family member alone and very tearful and stressed.  Palliative team sat with pt's family member and provided supportive counseling and presence as she processed the situation. Spiritual Care , Fozia Henriquez arrived and assisted in providing support.  Update provided by MD, and pt was transferred to ICU.  Fozia accompanied pt's family. Palliative team available if MD feels consult is appropriate.     Sonia Espinoza, LMSW, APHSW-C  Palliative Medicine Inpatient   [facility]  [phone]  [facility]   Palliative COPE Line: [phone])  
Patient arrived to ICU s/p RRT then code blue. Compressions not warranted. Atropine given per report. Patient hypothermic, bare hugger placed on patient. Bedside report received from JUDD Mccauley.  
Patient has left the facility via air transport. Transferring to [facility] for IR intervention.   
Patient hypotensive SBP 80-90. Spoke with Dr. Finley. Order obtained for Levophed.   
Patient in bed, awakens to name, minimal other responsiveness. Upon assessment and before giving morning medications this RN noticed temp of 94.4 oral blood pressure of 126/41 heart rate of 56. This RN was not notified of these vital signs. Recheck of vitals completes with temp 94.3 axillary blood pressure 116/41 heart rate 58. Medications held. Recheck of temp rectally indicated temp 93.9. Patient less responsive. Rapid called at this time.  
Patient is awake on low-dose fentanyl drip, on ventilator.  Persistent hypotension; requiring Levophed at 16 mcg.  Two more PRBC, 2 FFP and 1 vitamin K has been ordered and she is receiving.  She got 2 midlines.  CT head nil acute.  CT chest abdomen pelvis reviewed and D/w IR Dr. Leyva: Large right retroperitoneal hematoma of 16 cm.  Small bilateral pleural effusion with atelectasis.  Small groundglass and tree-in-bud nodularities in left lung.  Dr. Leyva recommended CTA to see if there is any active bleeding.  Discussed with Dr. Pritchard; he is okay with IV contrast to do CTA; patient may have worsening renal failure that may require hemodialysis ultimately if she survives.  CTA abdomen/pelvis ordered.  If there is an active bleeding, then patient will have to be transferred to [facility] for embolization/IR procedure.  Discussed with patient's son, daughter and other family members with Dr. Vivas, updated that she likely has spontaneous right retroperitoneal hematoma/hemorrhage causing these hypotension, unresponsiveness, anemia and all these events.  Also discussed that this is a fatal condition and patient sometimes will not survive.  Discussed the need of contrast with CTA and his consequences including renal failure that may require hemodialysis, if patient survives.  Also discussed that if there is an active bleeding, then she will need to be transferred to the facility where IR procedure can be performed.  Answered all questions to their satisfaction.  Discussed with ICU RN and updated.    Total critical care time spent 2 hours.    Theodore Finley MD 4/9/2024 1:57 PM         
Patient received Sacrament of the Sick by Father William on 4/2/2024.      conducted a follow up visit with Rozina Doss, who is a 78 y.o.,female.      Patient's daughter was at the bedside. Patient had requested Sacrament of the Sick and yesterday Father William provided that and a visit.  Patient was grateful.     Continued the relationship of care and support.   Listened empathically.  Offered prayer and assurance of continued prayer on patients behalf.   Chart reviewed.  Patient expressed gratitude for Spiritual Care visit.    Chaplains will continue to follow and will provide pastoral care as needed or requested.   recommends bedside caregivers page the  on duty if patient shows signs of acute spiritual or emotional distress.      Lianna Calderon MDiv.   Board Certified   [phone] - Office  
Patient transferred from ICU. Arrives on unit via bed. Alert and oriented x 4. C/o chronic back and leg pain. Patient placed on cardiac monitor. On O2 via nasal cannula 3L/min. Pt oriented to unit and staff. Skin assessment completed with ICU nurse Erick. Protective mepilex to sacrum. Noted to have lidocaine patch to mid lower back.Left 20 gauge IV AC saline locked. Davis catheter clamped in preparation for discontinuation. Pt oriented to unit and staff. Call bell within patient's reach. Fall prevention measures in place   
Patient transported to and from [facility] CTA abd accompanied by RT. Patient will need to be transferred for intervention per Dr. Finley.   
Patient transported to and from CT accompanied by RT.   
Patient unable to answer MRI Safety questions per nurse RN.  Family members do not know the answer to any of the safety questions. Exam on hold until safety can be completed fully and signed.  ROHIT  
Patient with Code blue called this morning. Pt intubated and transferred to ICU. Will discontinue PT orders at this time. Please reorder when medically appropriate to participate with PT.   Glenna Trejo, PT, DPT  
Patient with peaked T waves on cardiac monitor. Order obtained for BMP.   
Patient's chart and events reviewed during MDR this a.m.  No worsening in her pulmonary status from yesterday  Remains on 2 L/min O2 via NC. Screen for any home O2 need prior to DC home as per clinical course  Stress test 4/3/2024 reportedly no ischemic changes  BP is fairly well-controlled  Patient is waiting for cardiology clearance for transfer to telemetry  Pulmonary has signed off.  Patient will be followed in the office.  Please call as needed  RN staff aware to communicate with hospitalist regarding balderrama need beyond ICU and cardiology further.  No central line present.    Ubaldo Finley MD   
Persistent hypotension/shock; started on Levophed.  INR 1.8.  Repeat hemoglobin 3.9, even after 1 PRBC transfusion.  Will give 2 FFP and 2 more PRBC, vitamin K.  Waiting for CT head, chest, abdomen, pelvis report.    Theodore Finley MD 4/9/2024 12:19 PM   
Physical Therapy Goals:  Initiated 4/4/2024 to be met within 7-10 days.  Short Term Goals  Short Term Goal 1: Patient will perform supine to/from sit with independence  Short Term Goal 2: Patient will perform sit to/from stand with supervision in prep for gait progression  Short Term Goal 3: Patient will ambulate 50 ft with LRAD and supervision to progress OOB mobility  Short Term Goal 4: Patient will negotiate 4 stairs with handrails and CGA  PHYSICAL THERAPY EVALUATION    Patient: Rozina Doss (78 y.o. female)  Date: 4/4/2024  Diagnosis: Hypoxemia [R09.02]  Hyponatremia [E87.1]  Acute pulmonary edema (HCC) [J81.0]  Generalized weakness [R53.1]  Elevated brain natriuretic peptide (BNP) level [R79.89]  Pulmonary edema with left heart failure (HCC) [I50.1] Pulmonary edema with left heart failure (HCC)  Precautions: Fall Risk  PLOF: Independent ambulation with RW    ASSESSMENT :  Patient received supine in bed. Pt presents with decreased LE strength, decreased endurance, decreased gait quailty, decreased bed mobility and transfers , and impaired balance. Patient performed supine to sit with ModA. Patient perform sit to/from stand with ModA. Patient ambulated 3 lateral ft with rolling walker and ModA for weight shifting, balance, and RW management. Mild lightheadedness initially with sitting EOB improved with continued sitting and cues for breathing technique. Again lightheadedness in standing limiting gait duration and OOB mobility.Assisted with return to supine with ModA. Patient appears below baseline level and would benefit from continued skilled PT to progress functional mobility. Recommend SNF at discharge.    DEFICITS/IMPAIRMENTS:    , Body Structures, Functions, Activity Limitations Requiring Skilled Therapeutic Intervention: Decreased functional mobility ;Decreased ROM;Decreased strength;Decreased endurance;Decreased balance;Increased pain;Decreased posture    Patient will benefit from skilled 
Physical Therapy Goals:  Initiated 4/5/2024 to be met within 7-10 days.  Short Term Goals  Short Term Goal 1: Patient will perform supine to/from sit with independence  Short Term Goal 2: Patient will perform sit to/from stand with supervision in prep for gait progression  Short Term Goal 3: Patient will ambulate 50 ft with LRAD and supervision to progress OOB mobility  Short Term Goal 4: Patient will negotiate 4 stairs with handrails and CGA  PHYSICAL THERAPY TREATMENT    Patient: Rozina Doss (78 y.o. female)  Date: 4/5/2024  Diagnosis: Hypoxemia [R09.02]  Hyponatremia [E87.1]  Acute pulmonary edema (HCC) [J81.0]  Generalized weakness [R53.1]  Elevated brain natriuretic peptide (BNP) level [R79.89]  Pulmonary edema with left heart failure (HCC) [I50.1] Pulmonary edema with left heart failure (HCC)  Precautions: Fall Risk    ASSESSMENT:  Patient fatigued upon entering however agreeable to participate with PT. Patient performed transition to sitting EOB with MaxA. Stand transfers with ModA. Patient ambulated 3 side steps with RW and ModA. Gait duration and OOB mobility limited by endurance. No lightheadedness noted this session. Patient reported hip pain during session, unchanged pin level throughout session. Patient performed supine there ex for LE strengthening. Patient left supine in bed with all needs met and call bell within reach. Bed alarm intact, RN notified.    Progression toward goals:   []      Improving appropriately and progressing toward goals  [x]      Improving slowly and progressing toward goals  []      Not making progress toward goals and plan of care will be adjusted     PLAN:  Patient continues to benefit from skilled intervention to address the above impairments.  Continue treatment per established plan of care.    Further Equipment Recommendations for Discharge:  No    Discharge Recommendations: Subacute/Skilled Nursing Facility    [facility]:   AM-PAC Inpatient Mobility without Stair 
Physical Therapy Goals:  Initiated 4/8/2024 to be met within 7-10 days.  Short Term Goals  Short Term Goal 1: Patient will perform supine to/from sit with independence  Short Term Goal 2: Patient will perform sit to/from stand with supervision in prep for gait progression  Short Term Goal 3: Patient will ambulate 50 ft with LRAD and supervision to progress OOB mobility  Short Term Goal 4: Patient will negotiate 4 stairs with handrails and CGA  PHYSICAL THERAPY TREATMENT    Patient: Rozina Doss (78 y.o. female)  Date: 4/8/2024  Diagnosis: Hypoxemia [R09.02]  Hyponatremia [E87.1]  Acute pulmonary edema (HCC) [J81.0]  Generalized weakness [R53.1]  Elevated brain natriuretic peptide (BNP) level [R79.89]  Pulmonary edema with left heart failure (HCC) [I50.1] Pulmonary edema with left heart failure (HCC)  Precautions: Fall Risk    ASSESSMENT:  Patient visibly fatigued today. Patient participated in supine there ex with AAROM/PROM for strengthening and flexibility. Patient required MaxA for bed mobility and repositioning in bed. Continue to recommend SNF placement at d/c.    Progression toward goals:   []      Improving appropriately and progressing toward goals  []      Improving slowly and progressing toward goals  []      Not making progress toward goals and plan of care will be adjusted     PLAN:  Patient continues to benefit from skilled intervention to address the above impairments.  Continue treatment per established plan of care.    Further Equipment Recommendations for Discharge: No    Discharge Recommendations: Subacute/Skilled Nursing Facility    AMPAC:   AM-PAC Inpatient Mobility without Stair Climbing Raw Score : 10    This AMPAC score should be considered in conjunction with interdisciplinary team recommendations to determine the most appropriate discharge setting. Patient's social support, diagnosis, medical stability, and prior level of function should also be taken into consideration. 
Pt has not voided- bladder scanned 232 ml of urine    0559: Pt voided 60 ml     0607: Bladder scan 499    0800: straight cath 450 removed  
Pt transported to Icu intubated with 7.0 ET tube taped at 22cm at teeth placed on vent  AC 16 fio2 100% peep 5cm  
Pt utilizing nocturnal NIPPV settings of 8/+5/25% FiO2 and tolerating appropriately w/o issue.  WOB WNL and VSS.  Scheduled nebs administered as ordered.  Continue to monitor and assist with device application at bedtime and when clinically indicated.   
RENAL CONSULT  2024    Patient:  Rozina Doss  :  1945  Gender:  female  MRN #:  [id number]    Reason for Consult: CKD management and volume overload.    Subjective:  Says she is okay , same like yesterday , reports hip pain.  Urinating fine   Breathing little better on oxygen   No other concern      Objective:    BP (!) 154/53   Pulse 78   Temp 97.8 °F (36.6 °C) (Oral)   Resp 23   Ht 1.575 m (5' 2\")   Wt 71.2 kg (157 lb)   SpO2 95%   BMI 28.72 kg/m²     Physical Exam:    Pt awake,  alert and comfortable on oxygen   Lung:  no  crackles  Ext: no edema   CNS- Oriented to time , place and person     Laboratory Data:    Lab Results   Component Value Date    BUN 80 (H) 2024    BUN 72 (H) 2024    BUN 64 (H) 2024     (L) 2024     (L) 2024     (L) 2024    CO2 21 2024    CO2 20 (L) 2024    CO2 21 2024    GLUCOSE 110 (H) 2024    GLUCOSE 128 (H) 2024    GLUCOSE 136 (H) 2024     Lab Results   Component Value Date    WBC 4.3 (L) 2024    HGB 9.1 (L) 2024    HCT 26.2 (L) 2024     Lab Results   Component Value Date    CALCIUM 8.2 (L) 2024     No results found for: \"CHOLESTEROL\", \"HDL\"  No results found for: \"SPECIMENTYP\", \"TURBIDITY\"    Imaging Reveiwed:    CXR:-       IMPRESSION:  Interval development of pulmonary edema asymmetric to the right.           Specimen Collected: 24 04:26 EDT Last Resulted: 24 04:27 EDT                      Assessment:    Rozina Doss is a 78 y.o. year old female  with advanced CKD stage G4, hypertension , past smoking presented with   Weakness, b/l lower leg pain , cough and congestion   After receiving normal saline , becomes hypoxic and needed BIPAP overnight along with lasix     Renal function is close to her baseline , lasix was started last month for edema , losartan was decreased to 25 mg due to drop in GFR , atenolol was switched to 
RENAL CONSULT  2024    Patient:  Rozina Doss  :  1945  Gender:  female  MRN #:  [id number]    Reason for Consult: CKD management and volume overload.    Subjective:  Says she is doing okay , BP stable /improving   Urinating fine   Breathing little better  No leg edema      Objective:    BP (!) 152/54   Pulse 95   Temp 98.2 °F (36.8 °C) (Axillary)   Resp 21   Ht 1.575 m (5' 2\")   Wt 71.2 kg (157 lb)   SpO2 98%   BMI 28.72 kg/m²     Physical Exam:    Pt awake,  alert and comfortable on oxygen   Lung:  crackles and wheeze  Ext: no edema   CNS- Oriented to time , place and person     Laboratory Data:    Lab Results   Component Value Date    BUN 64 (H) 2024    BUN 53 (H) 2024    BUN 43 (H) 2023     (L) 2024     (L) 2024     2023    CO2 21 2024    CO2 21 2024    CO2 23 2023    GLUCOSE 136 (H) 2024    GLUCOSE 203 (H) 2024    GLUCOSE 70 (L) 2023     Lab Results   Component Value Date    WBC 5.9 2024    HGB 9.0 (L) 2024    HCT 26.1 (L) 2024     Lab Results   Component Value Date    CALCIUM 9.0 2024     No results found for: \"CHOLESTEROL\", \"HDL\"  No results found for: \"SPECIMENTYP\", \"TURBIDITY\"    Imaging Reveiwed:    CXR:-       IMPRESSION:  Interval development of pulmonary edema asymmetric to the right.           Specimen Collected: 24 04:26 EDT Last Resulted: 24 04:27 EDT                      Assessment:    oRzina Doss is a 78 y.o. year old female  with advanced CKD stage G4, hypertension , past smoking presented with   Weakness, b/l lower leg pain , cough and congestion   After receiving normal saline , becomes hypoxic and needed BIPAP overnight along with lasix     Renal function is close to her baseline , lasix was started last month for edema , losartan was decreased to 25 mg due to drop in GFR , atenolol was switched to labetalol    2 month ago creatinine 
RENAL CONSULT  2024    Patient:  Rozina Doss  :  1945  Gender:  female  MRN #:  [id number]    Reason for Consult: CKD management and volume overload.    Subjective:  Says she has severe hip and back pain , asking pain meds   Urinating fine   Breathing little better on oxygen   No leg edema   No other concern      Objective:    BP (!) 128/43   Pulse 68   Temp 97.8 °F (36.6 °C) (Oral)   Resp 26   Ht 1.575 m (5' 2\")   Wt 71.2 kg (157 lb)   SpO2 96%   BMI 28.72 kg/m²     Physical Exam:    Pt awake,  alert and comfortable on oxygen   Lung:  crackles and wheeze  Ext: no edema   CNS- Oriented to time , place and person     Laboratory Data:    Lab Results   Component Value Date    BUN 80 (H) 2024    BUN 72 (H) 2024    BUN 64 (H) 2024     (L) 2024     (L) 2024     (L) 2024    CO2 21 2024    CO2 20 (L) 2024    CO2 21 2024    GLUCOSE 110 (H) 2024    GLUCOSE 128 (H) 2024    GLUCOSE 136 (H) 2024     Lab Results   Component Value Date    WBC 4.3 (L) 2024    HGB 9.1 (L) 2024    HCT 26.2 (L) 2024     Lab Results   Component Value Date    CALCIUM 8.2 (L) 2024     No results found for: \"CHOLESTEROL\", \"HDL\"  No results found for: \"SPECIMENTYP\", \"TURBIDITY\"    Imaging Reveiwed:    CXR:-       IMPRESSION:  Interval development of pulmonary edema asymmetric to the right.           Specimen Collected: 24 04:26 EDT Last Resulted: 24 04:27 EDT                      Assessment:    Rozina Doss is a 78 y.o. year old female  with advanced CKD stage G4, hypertension , past smoking presented with   Weakness, b/l lower leg pain , cough and congestion   After receiving normal saline , becomes hypoxic and needed BIPAP overnight along with lasix     Renal function is close to her baseline , lasix was started last month for edema , losartan was decreased to 25 mg due to drop in GFR , 
RENAL CONSULT  4/3/2024    Patient:  Rozina Doss  :  1945  Gender:  female  MRN #:  [id number]    Reason for Consult: CKD management and volume overload.    Subjective:  Says she is hurting after stress test BP stable /improving   Urinating fine   Breathing little better  No leg edema      Objective:    BP (!) 161/58   Pulse 85   Temp 97.9 °F (36.6 °C) (Oral)   Resp 23   Ht 1.575 m (5' 2\")   Wt 71.2 kg (157 lb)   SpO2 96%   BMI 28.72 kg/m²     Physical Exam:    Pt awake,  alert and comfortable on oxygen   Lung:  crackles and wheeze  Ext: no edema   CNS- Oriented to time , place and person     Laboratory Data:    Lab Results   Component Value Date    BUN 72 (H) 2024    BUN 64 (H) 2024    BUN 53 (H) 2024     (L) 2024     (L) 2024     (L) 2024    CO2 20 (L) 2024    CO2 21 2024    CO2 21 2024    GLUCOSE 128 (H) 2024    GLUCOSE 136 (H) 2024    GLUCOSE 203 (H) 2024     Lab Results   Component Value Date    WBC 3.7 (L) 2024    HGB 8.6 (L) 2024    HCT 24.8 (L) 2024     Lab Results   Component Value Date    CALCIUM 8.3 (L) 2024     No results found for: \"CHOLESTEROL\", \"HDL\"  No results found for: \"SPECIMENTYP\", \"TURBIDITY\"    Imaging Reveiwed:    CXR:-       IMPRESSION:  Interval development of pulmonary edema asymmetric to the right.           Specimen Collected: 24 04:26 EDT Last Resulted: 24 04:27 EDT                      Assessment:    Rozina Doss is a 78 y.o. year old female  with advanced CKD stage G4, hypertension , past smoking presented with   Weakness, b/l lower leg pain , cough and congestion   After receiving normal saline , becomes hypoxic and needed BIPAP overnight along with lasix     Renal function is close to her baseline , lasix was started last month for edema , losartan was decreased to 25 mg due to drop in GFR , atenolol was switched to labetalol  
RENAL CONSULT  PROGRESS NOTE   2024    Patient:  Rozina Doss  :  1945  Gender:  female  MRN #:  [id number]    Reason for Consult: CKD management and volume overload.    Subjective:  She was hypotensive and bradycardic this morning ,intubated and transferred in ICU   She has severe drop in hemoglobin , now on 2 vasopressors   On mechanical vent        Objective:    BP (!) 94/46   Pulse 56   Temp (!) 93.6 °F (34.2 °C)   Resp 18   Ht 1.575 m (5' 2\")   Wt 78.4 kg (172 lb 13.5 oz)   SpO2 100%   BMI 31.61 kg/m²     Physical Exam:    Pt  on ventilator    Lung:  b/l crackles  S1s2 heard   Ext: trace edema     Laboratory Data:    Lab Results   Component Value Date    BUN 47 (H) 2024    BUN 78 (H) 2024    BUN 73 (H) 2024     2024     (L) 2024     (L) 2024    CO2 10 (L) 2024    CO2 18 (L) 2024    CO2 18 (L) 2024    GLUCOSE 109 (H) 2024    GLUCOSE 163 (H) 2024    GLUCOSE 115 (H) 2024     Lab Results   Component Value Date    WBC 7.9 2024    HGB 3.9 (LL) 2024    HCT 11.8 (LL) 2024     Lab Results   Component Value Date    CALCIUM <5.0 (LL) 2024     No results found for: \"CHOLESTEROL\", \"HDL\"  No results found for: \"SPECIMENTYP\", \"TURBIDITY\"    Imaging Reveiwed:    CXR:-       IMPRESSION:  Interval development of pulmonary edema asymmetric to the right.           Specimen Collected: 24 04:26 EDT Last Resulted: 24 04:27 EDT                      Assessment:    Rozina Doss is a 78 y.o. year old female  with advanced CKD stage G4, hypertension , past smoking presented with   Weakness, b/l lower leg pain , cough and congestion , she was treated in ICU for hypoxic respiratory failure, pulmonary edema and Uncontrolled Hypertension   Now again back in ICU for severe  hemorrhagic shock and respiratory failure   Hemoglobin is severely low , Ct pelvis showed right retroperitoneal 
RENAL CONSULT  PROGRESS NOTE   2024    Patient:  Rozina Doss  :  1945  Gender:  female  MRN #:  [id number]    Reason for Consult: CKD management and volume overload.    Subjective:  Says she is hurting, breathing stable on oxygen   Intermittent cough with phlegm   Urinating fine   No other concern      Objective:    BP (!) 136/46   Pulse 71   Temp 97.6 °F (36.4 °C) (Oral)   Resp 18   Ht 1.575 m (5' 2\")   Wt 78.4 kg (172 lb 13.5 oz)   SpO2 97%   BMI 31.61 kg/m²     Physical Exam:    Pt awake,  alert and comfortable on oxygen   Lung:  no  crackles  Ext: no edema     Laboratory Data:    Lab Results   Component Value Date    BUN 74 (H) 2024    BUN 76 (H) 2024    BUN 80 (H) 2024     (L) 2024     (L) 2024     (L) 2024    CO2 19 (L) 2024    CO2 21 2024    CO2 21 2024    GLUCOSE 99 2024    GLUCOSE 168 (H) 2024    GLUCOSE 110 (H) 2024     Lab Results   Component Value Date    WBC 3.9 (L) 2024    HGB 7.6 (L) 2024    HCT 22.9 (L) 2024     Lab Results   Component Value Date    CALCIUM 8.4 (L) 2024     No results found for: \"CHOLESTEROL\", \"HDL\"  No results found for: \"SPECIMENTYP\", \"TURBIDITY\"    Imaging Reveiwed:    CXR:-       IMPRESSION:  Interval development of pulmonary edema asymmetric to the right.           Specimen Collected: 24 04:26 EDT Last Resulted: 24 04:27 EDT                      Assessment:    Rozina Doss is a 78 y.o. year old female  with advanced CKD stage G4, hypertension , past smoking presented with   Weakness, b/l lower leg pain , cough and congestion   After receiving normal saline , becomes hypoxic and needed BIPAP overnight along with lasix     Renal function is close to her baseline , lasix was started last month for edema , losartan was decreased to 25 mg due to drop in GFR , atenolol was switched to labetalol    2 month ago creatinine was 
RENAL CONSULT  PROGRESS NOTE   2024    Patient:  Rozina Doss  :  1945  Gender:  female  MRN #:  [id number]    Reason for Consult: CKD management and volume overload.    Subjective:  Says she  ha avery but breathing is  breathing stable on oxygen   Intermittent cough with phlegm   Urinating fine , intermittent straight cath    No other concern      Objective:    BP (!) 149/48   Pulse 74   Temp 98 °F (36.7 °C) (Oral)   Resp 18   Ht 1.575 m (5' 2\")   Wt 78.4 kg (172 lb 13.5 oz)   SpO2 93%   BMI 31.61 kg/m²     Physical Exam:    Pt awake,  alert and comfortable on oxygen   Lung:  no  crackles  Ext: no edema     Laboratory Data:    Lab Results   Component Value Date    BUN 73 (H) 2024    BUN 74 (H) 2024    BUN 76 (H) 2024     (L) 2024     (L) 2024     (L) 2024    CO2 18 (L) 2024    CO2 19 (L) 2024    CO2 21 2024    GLUCOSE 115 (H) 2024    GLUCOSE 99 2024    GLUCOSE 168 (H) 2024     Lab Results   Component Value Date    WBC 3.9 (L) 2024    HGB 7.4 (L) 2024    HCT 21.5 (L) 2024     Lab Results   Component Value Date    CALCIUM 8.5 2024     No results found for: \"CHOLESTEROL\", \"HDL\"  No results found for: \"SPECIMENTYP\", \"TURBIDITY\"    Imaging Reveiwed:    CXR:-       IMPRESSION:  Interval development of pulmonary edema asymmetric to the right.           Specimen Collected: 24 04:26 EDT Last Resulted: 24 04:27 EDT                      Assessment:    Rozina Doss is a 78 y.o. year old female  with advanced CKD stage G4, hypertension , past smoking presented with   Weakness, b/l lower leg pain , cough and congestion   After receiving normal saline , becomes hypoxic and needed BIPAP overnight along with lasix     Renal function is close to her baseline , lasix was started last month for edema , losartan was decreased to 25 mg due to drop in GFR , atenolol was switched 
Received a call from Dr. Leyva with radiology regarding CT results. Findings communicated with Dr. Finley. Patient does not have any visible bruising or firmness to the back, flank, or abdominal area.   
Received a call from [facility], patient will now transfer to the ICU. Rm 710.   
Renal    Called by ER to see pt but found she sees Dr Braxton. I have made him aware so he can do the consult    
Renal Dosing Adjustment     The following medication: Zosyn was automatically dose-adjusted per [facility] P&T approved Protocols, with respect to renal function.  Indication: Upper Respiratory Infection    Pt Weight:   Wt Readings from Last 1 Encounters:   04/06/24 78.4 kg (172 lb 13.5 oz)       Previous Regimen                                            Zosyn 3.375 grams IV q8h x 5 days   Creatinine Clearance Estimated Creatinine Clearance: 12 mL/min (A) (based on SCr of 3.8 mg/dL (H)).   BUN Lab Results   Component Value Date/Time    BUN 78 04/09/2024 04:38 AM         Assessment/Plan  Dose adjusted to Zosyn 3.375 grams IV once (over 30 minutes), followed by Zosyn 3.375 grams IV q12h  x 9 doses  (total of 5 day)  (each dose to infuse over 240 minutes)   Extended infusion dosing while in ICU  Pharmacy to continue to monitor patient daily.   Will make dosage adjustments based upon changing renal function.    Avelina Bah, PharmD  Contact: [phone]  
Report called to JUDD Thomas ([facility]).  
Report called to Wilner at [facility] ICU.   
TRANSFER - OUT REPORT:    Verbal report given to Austyn RN on Rozina Doss  being transferred to icu 9 for routine progression of patient care       Report consisted of patient's Situation, Background, Assessment and   Recommendations(SBAR).     Information from the following report(s) Nurse Handoff Report, Adult Overview, and Event Log was reviewed with the receiving nurse.           Lines:   Peripheral IV 04/01/24 Left Antecubital (Active)   Site Assessment Clean, dry & intact 04/09/24 0805   Line Status Capped;Flushed 04/09/24 0805   Line Care Cap changed;Connections checked and tightened;Ports disinfected 04/09/24 0805   Phlebitis Assessment No symptoms 04/09/24 0805   Infiltration Assessment 0 04/09/24 0805   Alcohol Cap Used Yes 04/09/24 0805   Dressing Status Clean, dry & intact 04/09/24 0805   Dressing Type Transparent 04/09/24 0805       Extended Dwell Peripherial IV 04/09/24 Right Brachial (Active)       Extended Dwell Peripherial IV 04/09/24 Left Brachial (Active)       Extended Dwell Peripherial IV 04/09/24 Left Brachial (Active)       Arterial Line 04/09/24 Radial (Active)        Opportunity for questions and clarification was provided.      Patient transported with:  Monitor, O2 @ 15lpm, and Registered Nurse       
Transfer center called back again in ICU; requested for me to discuss with [facility] ER attending; they connected and I discussed with them and given the clinical information.  It appears that patient may go to ER holding region as there may not be ICU bed available right away; and patient will require/undergo IR procedure from ER while waiting for ICU bed availability.  Transfer center reported that helicopter ride has been arranged for transportation.  Family is updated.    Theodore Finley MD 4/9/2024 4:30 PM   
Transport Pt to CT scan on vent and back to Icu   
2.8 mg/dl   ECHO with normal EF, grade 1 diastolic dysfunction     Acute hypoxic respiratory   Pulmonary edema  Uncontrolled Hypertension   CKD stage 4G   Anemia of CKD          Plan:    Patient examined and labs reviewed, creatinine is stable but above baseline   Renal function not available this morning, BMP pending   Volume status improved, no pulmonary edema   Given drop in GFR will hold diuretics for now , use prn   Strict intake and output recording , especially urine output   Ensure that patient does not retain urine   Bladder scan daily prn   Avoid NSAIDS, contrast , nephrotoxin   Dose all medicine for current eGFR   No clinical and metabolic indication of dialysis , discussed in details with the patient and her grand daughter the possibility in event of continued drp in GFR with uremic symptoms , they voiced understanding   Hypertension: for now continue coreg 6.25 mg BID, nifedipine 30 mg daily   And hydralazine 50 mg TID, BP at target range        Fluid restriction to 1800 today   Salt intake less than 2 gram   Iron deficiency - continue  iv venofer 200 mg x 5 dose          Mateusz Pritchard MD, MD  [facility]- Nephrology                        
Atraumatic.  PERRLA, anicteric sclerae.  Lungs: CTA Bilaterally. No Wheezing/Rhonchi/Rales.  Heart:  S1 S2,  No murmur, No Rubs, No Gallops  Abdomen: Soft, Non distended, Non tender.  +Bowel sounds,   Extremities: No c/c/e  Psych:   Not anxious or agitated.  Neurologic:  No acute neurological deficit.         Vital signs/Intake and Output:  Visit Vitals  /65   Pulse 77   Temp 97.8 °F (36.6 °C) (Oral)   Resp 16   Ht 1.575 m (5' 2\")   Wt 71.2 kg (157 lb)   SpO2 98%   BMI 28.72 kg/m²     Current Shift:  04/03 0701 - 04/03 1900  In: 450 [P.O.:450]  Out: 400 [Urine:400]  Last three shifts:  04/01 1901 - 04/03 0700  In: 100 [P.O.:100]  Out: 2225 [Urine:2225]            Labs: Results:       Chemistry Recent Labs     04/01/24 0125 04/02/24  0455 04/03/24  0438   * 132* 129*   K 4.4 4.3 3.6    101 99*   CO2 21 21 20*   BUN 53* 64* 72*   GLOB 4.5*  --   --    ,No results found for: \"LACTA\"   CBC w/Diff Recent Labs     04/01/24 0125 04/02/24  0455 04/03/24  0438   WBC 5.9 5.9 3.7*   RBC 2.76* 2.68* 2.62*   HGB 9.1* 9.0* 8.6*   HCT 26.6* 26.1* 24.8*    191 194      Cardiac Enzymes Lab Results   Component Value Date    CKTOTAL 339 (H) 04/15/2023    TROPHS 49 04/01/2024   ,No results found for: \"BNP\"   Coagulation No results for input(s): \"INR\", \"APTT\" in the last 72 hours.    Invalid input(s): \"PTP\"    Lipid Panel No results found for: \"CHOL\", \"CHOLPOCT\", \"CHOLX\", \"CHLST\", \"CHOLV\", \"438328\", \"HDL\", \"HDLC\", \"LDL\", \"LDLC\", \"890401\", \"VLDLC\", \"VLDL\", \"TGLX\", \"TRIGL\"   Pancreas No results for input(s): \"LIPASE\" in the last 72 hours.,No results for input(s): \"AMYLASE\" in the last 72 hours.   Liver Enzymes No results for input(s): \"TP\", \"ALB\" in the last 72 hours.    Invalid input(s): \"TBIL\", \"AP\", \"SGOT\", \"GPT\", \"DBIL\"   Thyroid Studies No results found for: \"T4\", \"T3RU\", \"TSH\"     Procedures/imaging: see electronic medical records for all procedures/Xrays and details which were not copied into this note 
cardiology  Cardiology following    ID: No leukocytosis; low grade fever  Procalcitonin normal  Influenza A and B, COVID-19 PCR negative  Antibiotic: empirically Rocephin for any UTI with urine retention    Hematology/Oncology: Monitor CBC; H/Z-doqgnt-yzeytjfujmc low from iron deficiency  No evidence of active bleeding  Normal platelets    Renal: worsening in S. Cr after Lasix  Monitor renal functions, lytes, UO  Replace lytes per protocol cautiously and recheck after replacement  Nephrology following    GI/: Diet as tolerated  PPI  LFT normal    Endocrine: Monitor BS. SSI as needed  TSH normal    Neurology: AAOx3, reports some numbness to both lower extremities  Pulses are intact in both lower extremities on exam  No evidence for compartment or ischemic limb  Defer any CT/MRI of lumbosacral area to hospitalist    Psychiatry: None    Toxicology: Former nicotine use    Pain/Sedation: Judicious opiate, hypnotic; avoid if possible    Skin/Wound: as per nursing care protocol; wound care as needed    Prophylaxis: DVT Prophylaxis: SCD/Heparin. GI Prophylaxis: diet.     Restraints: none    Lines/Tubes: PIV  No Davis or central line    Advance Directive/Palliative Care: Full code. Consult Palliative care as per clinical course.    Will defer respective systems problem management to primary and other respective consultant and follow patient while in ICU with primary and other medical team. Will sign off once out of ICU  Meanwhile, further recommendations will be based on the patient's response to recommended treatment and results of the investigation ordered.  Quality Care: PPI, DVT prophylaxis, HOB elevated, Infection control all reviewed and addressed.    Care of plan d/w RN, RT, MDR, hospitalist team.  D/w patient, family-daughter is at bedside at patient's request (answered all questions to satisfaction at patient's request).   Transfer to floor when ok with hospitalist    High complexity decision making was performed 
kg (156 lb 15.5 oz)  Current Body Weight: 78.4 kg (172 lb 13.5 oz), 157.1 % IBW. Weight Source: Bed Scale (noted lot of blanket on bed)  Current BMI (kg/m2): 31.6  Usual Body Weight: 66.7 kg (147 lb 0.8 oz) (noted on 11/3/23)  % Weight Change (Calculated): 17.5  Weight Adjustment For: No Adjustment                 BMI Categories: Obese Class 1 (BMI 30.0-34.9)    Estimated Daily Nutrient Needs:  Energy Requirements Based On: Formula  Weight Used for Energy Requirements: Admission  Energy (kcal/day): 1381 ([place] 1.2AF, 1SF)  Weight Used for Protein Requirements: Ideal  Protein (g/day): 50-60 (1-1.2 g/kg)  Method Used for Fluid Requirements: Standard Renal  Fluid (ml/day): or per MD    Malnutrition Assessment:  Malnutrition Status:  At risk for malnutrition (Comment) (r/t CKD3) (04/08/24 1417)    Context:  Acute Illness     Findings of the 6 clinical characteristics of malnutrition:  Energy Intake:  Mild decrease in energy intake (Comment) (</= 50% x 2days)  Weight Loss:  Unable to assess     Body Fat Loss:  Unable to assess     Muscle Mass Loss:  Unable to assess    Fluid Accumulation:  Mild Extremities (trace lower extremities)   Strength:   (EDDI)    Nutrition Diagnosis:   Altered nutrition-related lab values related to  (hyponatremia) as evidenced by lab values (Na+ 128 mmol/L)  Inadequate oral intake related to  (general weakness) as evidenced by other (comment) (po intake declined over the past 24hr, from 75% to ~25%)    Nutrition Interventions:   Food and/or Nutrient Delivery: Modify Current Diet  Nutrition Education/Counseling: No recommendation at this time  Coordination of Nutrition Care: Continue to monitor while inpatient       Goals:     Goals: Meet at least 75% of estimated needs, PO intake 75% or greater, prior to discharge       Nutrition Monitoring and Evaluation:      Food/Nutrient Intake Outcomes: Food and Nutrient Intake  Physical Signs/Symptoms Outcomes: Biochemical Data, Weight, Fluid Status 
me if you have any questions or concerns.    Subjective:    cc:    Leg weakness and shortness of breath      REVIEW OF SYSTEMS:     General: No fevers or chills.  Cardiovascular: No chest pain or pressure. No palpitations. No ankle swelling  Pulmonary: No SOB, orthopnea, PND  Gastrointestinal: No nausea, vomiting or diarrhea      Objective:      Visit Vitals  BP (!) 119/49   Pulse 60   Temp 98.7 °F (37.1 °C) (Oral)   Resp 15   Ht 1.575 m (5' 2\")   Wt 71.2 kg (157 lb)   SpO2 98%   BMI 28.72 kg/m²     Body mass index is 28.72 kg/m².    Physical Exam:  General Appearance: Comfortable, not using accessory muscles of respiration.  NECK: No JVD, no thyroidomeglay.   LUNGS:  bilateral air entry positive   HEART: S1+S2 audible,    ABD: Non-tender, BS Audible    EXT: No edema, and no cysnosis.  VASCULAR EXAM: Pulses are intact.    PSYCHIATRIC EXAM: Mood is appropriate.    Medication:  Current Facility-Administered Medications   Medication Dose Route Frequency    iron sucrose (VENOFER) 200 mg in sodium chloride 0.9 % 250 mL IVPB  200 mg IntraVENous Q24H    traMADol (ULTRAM) tablet 50 mg  50 mg Oral Q6H PRN    lidocaine 4 % external patch 1 patch  1 patch TransDERmal Daily    oxyCODONE-acetaminophen (PERCOCET) 5-325 MG per tablet 1 tablet  1 tablet Oral Q8H PRN    guaiFENesin (MUCINEX) extended release tablet 600 mg  600 mg Oral BID    hydrALAZINE (APRESOLINE) tablet 50 mg  50 mg Oral 3 times per day    NIFEdipine (PROCARDIA XL) extended release tablet 30 mg  30 mg Oral Daily    carvedilol (COREG) tablet 6.25 mg  6.25 mg Oral BID    insulin lispro (HUMALOG) injection vial 0-4 Units  0-4 Units SubCUTAneous TID WC    insulin lispro (HUMALOG) injection vial 0-4 Units  0-4 Units SubCUTAneous Nightly    ipratropium 0.5 mg-albuterol 2.5 mg (DUONEB) nebulizer solution 1 Dose  1 Dose Inhalation Q4H PRN    heparin (porcine) injection 5,000 Units  5,000 Units SubCUTAneous 3 times per day    acetaminophen (TYLENOL) tablet 650 mg  650 mg 
  MCHC 33.2  --  34.0 34.8   RDW 12.6  --  12.3 12.4     --  225 188   MPV 10.3  --  10.5 10.7           CMP Recent Labs     04/07/24  0535 04/08/24  0524 04/09/24  0438   * 128* 127*   K 4.1 4.1 5.1    99* 98*   CO2 19* 18* 18*   BUN 74* 73* 78*   ANIONGAP 10 11 11   CREATININE 3.58* 3.39* 3.80*   GLUCOSE 99 115* 163*   CALCIUM 8.4* 8.5 8.7          Ionized Calcium:   No results found for: \"IONCA\"    No results found for: \"IONCA\"  No results for input(s): \"IONCA\" in the last 72 hours.    Magnesium:   No results for input(s): \"MG\" in the last 72 hours.       Phosphorus:   No results for input(s): \"PHOS\" in the last 72 hours.      Amylase, Lipase: No results found for: \"AMYLASE\", \"LIPASE\"  No results for input(s): \"AMYLASE\", \"LIPASE\" in the last 72 hours.    Lipase: No results found for: \"LIPASE\"  No results for input(s): \"LIPASE\" in the last 72 hours.    Ammonis:   Lab Results   Component Value Date/Time    AMMONIA <10 04/04/2024 02:52 AM      No results for input(s): \"AMMONIA\" in the last 72 hours.     Lactic Acid Lactic Acid, Plasma   Date Value Ref Range Status   04/08/2023 1.1 0.4 - 2.0 MMOL/L Final   04/08/2023 1.3 0.4 - 2.0 MMOL/L Final       No results for input(s): \"LACACIDPL\" in the last 72 hours.     Cardiac Enzymes Total CK   Date/Time Value Ref Range Status   04/15/2023 02:18  (H) 26 - 192 U/L Final   04/14/2023 02:23  (H) 26 - 192 U/L Final   04/13/2023 10:22 AM 1,147 (H) 26 - 192 U/L Final     Troponin, High Sensitivity   Date/Time Value Ref Range Status   04/01/2024 04:20 AM 49 0 - 54 ng/L Final     Comment:     A HS troponin value change of (+ or -) 50% or more below the 99th percentile, in a 1/2/3 hr interval represents a significant change. Clinical correlation is recommended.  A HS troponin value change of (+ or -) 20% or above the 99th percentile, in a 1/2/3 hr interval represents a significant change. Clinical correlation is recommended.  99th Percentile:    Women: 
inspiration; therefore the estimated right atrial pressure is normal (~3 mmHg).   Image quality is fair. Procedure performed with the patient in a sitting position.     Vascular duplex lower extremity venous bilateral    Result Date: 4/1/2024    No evidence of deep vein thrombosis in the right lower extremity.   No evidence of deep vein thrombosis in the left lower extremity.     XR CHEST 1 VIEW    Result Date: 4/1/2024  INDICATION: Shortness of breath  EXAM:  AP CHEST RADIOGRAPH COMPARISON: April 1, 2024 FINDINGS: AP portable view of the chest demonstrates a normal cardiomediastinal silhouette. Interval development of interstitial and airspace opacities slightly asymmetric to the right. No pleural effusion or pneumothorax. The osseous structures are unremarkable.     Interval development of pulmonary edema asymmetric to the right.    XR CHEST PORTABLE    Result Date: 4/1/2024  INDICATION: Shortness of breath  EXAM:  AP CHEST RADIOGRAPH COMPARISON: None FINDINGS: AP portable view of the chest demonstrates a normal cardiomediastinal silhouette. There is no edema, effusion, consolidation, or pneumothorax. The osseous structures are unremarkable.     No acute process.              ·Please note: Voice-recognition software may have been used to generate this report, which may have resulted in some phonetic-based errors in grammar and contents. Even though attempts were made to correct all the mistakes, some may have been missed, and remained in the body of the document.      Ubaldo Finley MD  4/3/2024

## 2024-04-10 NOTE — FLOWSHEET NOTE
CRRT Initiation @ 1212:       04/10/24 1212   Treatment   $CRRT $Yes   Machine #   (PM04)   Cartridge Lot #   ([id number])   Therapy Type CVVH   Pressures   Access (mmHg) (!) -47 mmHg   Return/Venous (mmHg) (!) 31 mmHg   Filter (mmHg) 106 mmHg   TMP Pressure (mmHg) 18 mmHg   Pressure Drop (mmHg) 41 mmHg   Deaeration Chamber Check Yes   Flow Rates   Blood Flow (mL/min) 180 mL/min   Replacement Fluid Pre-Filter (mL/hr) 470 mL/hr   Replacement Filter Post-Filter (mL/hr) 470 mL/hr   Pre-Blood Pump (mL/hr) 946 mL/hr   Isabelle Calculation   (D) Physician Ordered Hourly Removal (mL) 25 ml   CRRT Activities   Intervention Initiated   Ultrafiltrate Assessment   Ultrafiltrate Color Yellow/straw   Ultrafiltrate Appearance Clear   Hemodialysis Central Access Right Neck   Placement Date/Time: 04/09/24 2130   Present on Admission/Arrival: No  Orientation: Right  Access Location: Neck   Continued need for line? Yes   Site Assessment Clean, dry & intact   CVC Lumen Status Infusing  (Lines reversed)   Venous Lumen Status Brisk blood return;Flushed;Infusing   Arterial Lumen Status Sluggish blood return;Flushed;Infusing   Line Care Connections checked and tightened;Ports disinfected   Dressing Type Transparent;Bacteriocidal   Date of Last Dressing Change 04/09/24   Dressing Status Clean, dry & intact   Dressing Change Due 04/12/24     Primary RN SBAR: MELISSA Glez RN  Patient Education: Intubated/Sedated    Hepatitis B Surface Ag   Date/Time Value Ref Range Status   04/10/2023 06:49 AM <0.10 <1.00 Index Final     Hep B S Ab   Date/Time Value Ref Range Status   04/10/2023 06:49 AM <3.10 (L) >10.0 mIU/mL Final     BP: 103/29   HR: 63  RR: 16  SPO2: 100%    Upon arrival to patient room, RIJ being utilized by primary RN for IV medication infusion. Discussed with Dr. Ewing, per MD OK to utilize line for CKRT. Primary RN infusing IV medication through peripheral IV at this time.     CRRT initiated per MD order. Patient, code status, labs, and  orders verified. Consents obtained. Line placement confirmed by chest xray. Filter set-up, primed, tested and running well. RIJ temporary non-tunneled CVC, dressing CDI. No signs of redness, drainage, or infection visualized. Each catheter limb disinfected for 60 seconds per limb with alcohol swabs. Caps removed, dialysis CVC hub scrubbed with Prevantics for 5 seconds, followed by a 5 second dry time per Hospital P&P. +asp/+flush x 2 ports, sluggish aspiration from red port. Lines reversed, visible and connections secure with blood warmer to return line at 37*C. Blood flow rate reduced to 180 ml/min for optimal access pressure results.

## 2024-04-10 NOTE — DISCHARGE SUMMARY
[facility]  DISCHARGE SUMMARY    Name:  JULES KING  MR#:   [id number]  :  1945  ACCOUNT #:  [id number]  ADMIT DATE:  2024  DISCHARGE DATE:  2024     TRANSFER SUMMARY    DIAGNOSES AT TRANSFER:  1.  Acute retroperitoneal bleed.  2.  Severe acute blood loss anemia due to above.  3.  Chronic kidney disease stage IV.  4.  Unresponsiveness.  5.  Acute-on-chronic diastolic heart failure.  6.  Hyponatremia.  7.  Metabolic acidosis.  8.  Severe iron-deficiency.  9.  Bradycardia.  10.  Hypertension.    HOSPITAL SUMMARY:  This is a 78-year-old female admitted to the intensive care unit on the aforementioned admission date.  She was treated for acute-on-chronic diastolic heart failure and volume overload and required BiPAP therapy.  The patient was followed during her hospital stay by Critical Care Pulmonology, Nephrology, Pulmonology, and Cardiology.  The patient was treated for pulmonary edema, received IV diuresis and BiPAP treatment.  There is no known history of sleep apnea.  She was able to be weaned from the BiPAP and continued on Coreg, nifedipine and hydralazine as well as Lasix and was ultimately transferred to the telemetry unit where she completed a course of empiric antibiotics for possible respiratory infection.  She had weaned to a nasal cannula for oxygen due to complaints of pain.  She had an MRI of her pelvis that showed no acute fracture and a left gluteus minimus tendon partial tear.  MRI of the lumbar spine was done on  that showed chronic moderate compression deformity at T12.  No acute fracture.  A CT head was done on  that showed no acute abnormality.  The patient was stabilizing after diuretics, but still having congestion and cough and intermittent pain.  She developed some intermittent epistaxis due to the oxygen support, so heparin injections were held.  Repeat chest x-ray on  showed no acute cardiopulmonary process.  The patient had

## 2024-04-10 NOTE — PLAN OF CARE
Problem: Safety - Medical Restraint  Goal: Remains free of injury from restraints (Restraint for Interference with Medical Device)  Description: INTERVENTIONS:  1. Determine that other, less restrictive measures have been tried or would not be effective before applying the restraint  2. Evaluate the patient's condition at the time of restraint application  3. Inform patient/family regarding the reason for restraint  4. Q2H: Monitor safety, psychosocial status, comfort, nutrition and hydration  Outcome: Progressing  Flowsheets (Taken 4/9/2024 2226 by Ti Diaz RN)  Remains free of injury from restraints (restraint for interference with medical device):   Determine that other, less restrictive measures have been tried or would not be effective before applying the restraint   Inform patient/family regarding the reason for restraint   Evaluate the patient's condition at the time of restraint application   Every 2 hours: Monitor safety, psychosocial status, comfort, nutrition and hydration     Problem: Safety - Adult  Goal: Free from fall injury  Outcome: Progressing  Flowsheets (Taken 4/10/2024 1844)  Free From Fall Injury: Instruct family/caregiver on patient safety     Problem: Pain  Goal: Verbalizes/displays adequate comfort level or baseline comfort level  Outcome: Not Progressing  Flowsheets (Taken 4/10/2024 1844)  Verbalizes/displays adequate comfort level or baseline comfort level:   Assess pain using appropriate pain scale   Administer analgesics based on type and severity of pain and evaluate response   Implement non-pharmacological measures as appropriate and evaluate response     Problem: Chronic Conditions and Co-morbidities  Goal: Patient's chronic conditions and co-morbidity symptoms are monitored and maintained or improved  Outcome: Not Progressing  Flowsheets (Taken 4/10/2024 1844)  Care Plan - Patient's Chronic Conditions and Co-Morbidity Symptoms are Monitored and Maintained or Improved:

## 2024-04-10 NOTE — FLOWSHEET NOTE
CRRT Restart @ 1415 Note:     04/10/24 1401   Treatment   $CRRT $Yes   Machine #   ([id number])   Cartridge Lot #   ([id number])   Therapy Type CVVH   Pressures   Access (mmHg) (!) -41 mmHg   Return/Venous (mmHg) (!) 27 mmHg   Filter (mmHg) 92 mmHg   TMP Pressure (mmHg) 14 mmHg   Pressure Drop (mmHg) 36 mmHg   Deaeration Chamber Check Yes   Flow Rates   Therapy Fluid (L/hr) 1.9 L/hr   Blood Flow (mL/min) 180 mL/min   Replacement Fluid Pre-Filter (mL/hr) 470 mL/hr   Replacement Filter Post-Filter (mL/hr) 470 mL/hr   Pre-Blood Pump (mL/hr) 946 mL/hr   Isabelle Calculation   (D) Physician Ordered Hourly Removal (mL) 25 ml   CRRT Activities   Temporary Disconnect Other  (Effluent cartridge leaking-New filter & set up)   Intervention Initiated   Ultrafiltrate Assessment   Ultrafiltrate Color Yellow/straw   Ultrafiltrate Appearance Clear   Hemodialysis Central Access Right Neck   Placement Date/Time: 04/09/24 2130   Present on Admission/Arrival: No  Orientation: Right  Access Location: Neck   Continued need for line? Yes   Site Assessment Clean, dry & intact   CVC Lumen Status Infusing   Venous Lumen Status Brisk blood return;Flushed;Infusing   Arterial Lumen Status Sluggish blood return;Flushed;Infusing   Line Care Ports disinfected;Connections checked and tightened   Dressing Type Bacteriocidal;Transparent   Date of Last Dressing Change 04/09/24   Dressing Status Clean, dry & intact   Dressing Change Due 04/12/24     Primary RN SBAR: MELISSA Glez RN  Patient Education: Pt intubated/sedated  Hepatitis B Surface Ag   Date/Time Value Ref Range Status   04/10/2023 06:49 AM <0.10 <1.00 Index Final     Hep B S Ab   Date/Time Value Ref Range Status   04/10/2023 06:49 AM <3.10 (L) >10.0 mIU/mL Final     Filter changed secondary to leaking effluent cartridge. All possible blood (186 ml) returned by primary RN. Consents, patient, code status, labs and orders verified. Old set discarded in red bio-hazard bag. New effluent cartridge & [id number]

## 2024-04-10 NOTE — CONSENT
Informed Consent for Blood Component Transfusion Note    I have discussed with the son and daughter the rationale for blood component transfusion; its benefits in treating or preventing fatigue, organ damage, or death; and its risk which includes mild transfusion reactions, rare risk of blood borne infection, or more serious but rare reactions. I have discussed the alternatives to transfusion, including the risk and consequences of not receiving transfusion. The son and daughter had an opportunity to ask questions and had agreed to proceed with transfusion of blood components.    Electronically signed by Dawson Thibodeaux MD on 4/10/24 at 12:34 PM EDT

## 2024-04-10 NOTE — PROCEDURES
Procedure Note - Temporary HD Catheter Placement (Mervin):   Performed by MELISSA Bar NP     Diagnosis: Hemorrhagic shock  Insertion Date: 04/09/24  Time:9:39 PM   Obtained Consent? yes; emergent   Procedure Location:  ICU.      Immediately prior to the procedure, the patient was reevaluated and found suitable for the planned procedure and any planned medications.  Immediately prior to the procedure a time out was called to verify the correct patient, procedure, equipment, staff, and marking as appropriate.    Central line Bundle:  Full sterile barrier precautions used.  7-Step Sterility Protocol followed.  (cap, mask sterile gown, sterile gloves, large sterile sheet, hand hygiene, 2% chlorhexidine for cutaneous antisepsis)  5 mL 1% Lidocaine placed at insertion site.      Patient positioned in Trendelenburg?yes   The site was prepped with ChloraPrep.   Catheter inserted into a new site.     Using Seldinger technique a Hemodialysis Catheter was placed in the Right, Internal Jugular Vein via direct cannulation with 1 number of attempts for large bore IV central line access in case MTP required and possible Dialysis.    Ultrasound Guidance was utilized.    There was good dark, non-pulsatile blood return in all ports.  Femoral Site? no. If Yes, reason femoral site was chosen: n/a  Catheter secured. Biopatch/CHG bio-occlusive dressing in place? yes.   The following complications were encountered: None.  A follow-up chest x-ray ordered post procedure.    The procedure was tolerated well.    MELISSA Bar NP  Critical Care Medicine  [facility]

## 2024-04-10 NOTE — CARE COORDINATION
Care Management Initial Assessment       RUR: 25% High   Readmission? No, transferred from [facility]  1st IM letter given? No     04/10/24 0845   Service Assessment   Patient Orientation Unable to Assess  (Patient is intubated, sedated on propofol)   Cognition Other (see comment)  (unable to assess)   History Provided By Medical Record   Primary Caregiver Family   Support Systems Children  (Children Hyacinth Schaeffer, Jose Doss and Mima Rouse)   PCP Verified by CM Yes  (Dr Alvin Kaiser)   Last Visit to PCP Within last 3 months   Prior Functional Level Independent in ADLs/IADLs   Current Functional Level Assistance with the following:;Bathing;Dressing;Toileting;Feeding;Mobility   Can patient return to prior living arrangement No   Ability to make needs known: Unable   Family able to assist with home care needs: Yes   Would you like for me to discuss the discharge plan with any other family members/significant others, and if so, who? Yes  (MPOA Hyacinth Schaeffer and Jose Doss)   Financial Resources Medicare  ([employer] Medicare)   Community Resources None   Social/Functional History   Lives With Family   Type of Home House   Home Layout Two level   Home Equipment Cane;Walker, standard   Receives Help From Family   ADL Assistance Needs assistance   Bath Maximum assistance   Dressing Maximum assistance   Grooming Moderate assistance   Feeding Modified independent    Toileting Needs assistance   Ambulation Assistance Needs assistance   Transfer Assistance Needs assistance   Active  No   Patient's  Info Family   Mode of Transportation Car   Occupation Retired   Discharge Planning   Type of Residence Skilled Nursing Facility  ([facility])   Living Arrangements Children   Current Services Prior To Admission None   Potential Assistance Purchasing Medications No   Patient expects to be discharged to: Skilled nursing facility     Patient presented to the ED at [facility] on 4/1/24 with cough, body aches  and malaise. Patient developed flash pulmonary edema placed on Bipap.  04/09 patient became acutely unresponsive and significantly hypotensive, hypothermic, bradycardic w/ severe decrease in hgb w/ no identified source of active bleeding. Hgb dropped from 7.4 -> 3.9 in 72hrs. She was intubated at for airway protection, started on NE, and transfused w/ 3 units PRBC's and 2 units FFP. Transferred to [facility] for Retroperitoneal angiogram and embolization.   Patient remains intubated. Per notes from previous CM patient needed assistance with mobility uses a walker and a cane. She has been to [facility] in [place] and was scheduled to be discharged to [facility] at discharge. MPOA's are daughter Hyacinth Eddyb and Jose Doss. Patient last saw her PCP Dr Alvin Kaiser on 2/13 and uses the [facility] pharmacy in [place].   Care management will continue to follow   Nel Gonzales RN,Care Management

## 2024-04-10 NOTE — CARE COORDINATION
04/10/24 0916   Readmission Assessment   Number of Days since last admission? 1-7 days  (Transfer from [facility])   Previous Disposition Other (comment)  (Transfer from [facility])   Who is being Interviewed Caregiver  (information obtained from chart)   What was the patient's/caregiver's perception as to why they think they needed to return back to the hospital? Other (Comment)  (NA)   Did you visit your Primary Care Physician after you left the hospital, before you returned this time? No   Why weren't you able to visit your PCP? Other (Comment)  (NA)   Did you see a specialist, such as Cardiac, Pulmonary, Orthopedic Physician, etc. after you left the hospital? No   Who advised the patient to return to the hospital? Other (Comment)  (NA)   Does the patient report anything that got in the way of taking their medications? No   In our efforts to provide the best possible care to you and others like you, can you think of anything that we could have done to help you after you left the hospital the first time, so that you might not have needed to return so soon? Other (Comment)  (Patient transferred from [facility] for Embolectomy and coiling for retroperitoneal bleed)

## 2024-04-10 NOTE — H&P
temp 92.1 F. On arrival to unit: 95.5 F  - Continue jose hugger until T- >96 F  - Pre-procedure CT abd/pelvis: A large right retroperitoneal hemorrhage has slightly increased in size compared to the CT performed 3 hours prior. It now measures 10.8 x 11.6 x 17.0 cm (AP x TR x CC), previously 9.6 x 11.6 x 16.1 cm, and contains approximately 1100 cc blood, previously 960 cc. There is a focus of active bleeding, which appears to be arising from the right psoas muscle  - Will obtain repeat imaging if signs of active bleeding occur  - NPO     Transaminitis   - ALT 3,140  - AST >2,000  - T.bili 1.6  - Trend daily HFP     RENAL/ELECTROLYTE/FLUIDS:   Metabolic acidosis  - sCO2 13 -> 15  - Continue bicarb gtt @ 150ml/hr  - Daily BMP  - Replete lytes prn    ARIC on CKD stage 3  - Nephrology consulted, appreciate recs  - sCr 2.14 -> 4.23, GFR 23 -> 10 in <24hrs  - Avoid nephrotoxic drugs  - Monitor UOP    Hyperkalemia  - K+ 4.4 -> 5.9 in <24hrs  - Trending back down, now 5.5    - Trend mag and phos daily    ENDOCRINE:   H/o T2DM  - Hold insulin  - Avoid hypo/hyperglycemia    HEMATOLOGY/ONCOLOGY:   RP bleed as above  - Daily CBC  - q6h H/H  - Transfuse prn    INFECTIOUS DISEASE:     ANTIBIOTICS TO DATE:  Ceftriaxone (04/02-04/04)      CULTURES TO DATE:  Results       Procedure Component Value Units Date/Time    Culture, Urine [[id number]] Collected: 04/02/24 0918    Order Status: Completed Specimen: Urine, clean catch Updated: 04/03/24 1034     Special Requests NO SPECIAL REQUESTS        Culture No growth (<1,000 CFU/ML)       COVID-19 & Influenza Combo [[id number]] Collected: 04/01/24 0125    Order Status: Completed Specimen: Nasopharyngeal Updated: 04/01/24 0202     SARS-CoV-2, PCR Not detected        Comment: Not Detected results do not preclude SARS-CoV-2 infection and should not be used as the sole basis for patient management decisions.Results must be combined with clinical observations, patient history, and epidemiological  tendon partial tear.  5.  Incidental findings as above.  6.  MRI lumbar spine report dictated separately.    Most Recent Echo  24    ECHO (TTE) LIMITED (PRN CONTRAST/BUBBLE/STRAIN/3D) 2024  6:33 PM (Final)    Interpretation Summary    Right Ventricle: Not well visualized. Visually normal systolic function.    Aortic Valve: Trileaflet valve. Moderate sclerosis of the aortic valve cusp.    Mitral Valve: Moderate annular calcification of the mitral valve. Trace regurgitation.    Left Ventricle: Hyperdynamic left ventricular systolic function (on vasopressors). Left ventricle size is normal. Mildly increased wall thickness. Normal wall motion. Abnormal diastolic function. MV E/e' lateral velocity is 12.75 .    Left Ventricle: Hyperdynamic left ventricular systolic function with a visually estimated EF of 65 - 70%. Left ventricle size is normal. Mildly increased wall thickness.    Tricuspid Valve: Trace regurgitation. The estimated RVS/PAS pressure is 32 mmHg.    Pericardium: No pericardial effusion.    Image quality is technically difficult. Limited Doppler study due to patient's condition and procedure performed with the patient in a supine position.  On ventilator support    Signed by: Brian Shukla MD on 2024  6:33 PM      BP (!) 116/44   Pulse 65   Temp 97.5 °F (36.4 °C) (Axillary)   Resp 15   SpO2 100%      Temp (24hrs), Av.6 °F (34.8 °C), Min:92.1 °F (33.4 °C), Max:97.6 °F (36.4 °C)           Intake/Output:     Intake/Output Summary (Last 24 hours) at 2024  Last data filed at 2024  Gross per 24 hour   Intake 150 ml   Output --   Net 150 ml         CRITICAL CARE DOCUMENTATION  I had a face to face encounter with the patient, reviewed and interpreted patient data including clinical events, labs, images, vital signs, I/O's, and examined patient.  I have discussed the case and the plan and management of the patient's care with the consulting services, the bedside nurses and

## 2024-04-10 NOTE — PROGRESS NOTES
4 Eyes Skin Assessment     NAME:  Rozina Doss  YOB: 1945  MEDICAL RECORD NUMBER:  [id number]    The patient is being assessed for  Admission    I agree that at least one RN has performed a thorough Head to Toe Skin Assessment on the patient. ALL assessment sites listed below have been assessed.      Areas assessed by both nurses:    Head, Face, Ears, Shoulders, Back, Chest, Arms, Elbows, Hands, Sacrum. Buttock, Coccyx, Ischium, and Legs. Feet and Heels        Does the Patient have a Wound? No noted wound(s)       Da Prevention initiated by RN: Yes  Wound Care Orders initiated by RN: No    Pressure Injury (Stage 3,4, Unstageable, DTI, NWPT, and Complex wounds) if present, place Wound referral order by RN under : No    New Ostomies, if present place, Ostomy referral order under : No     Nurse 1 eSignature: Electronically signed by Ti Diaz RN on 4/10/24 at 12:45 AM EDT    **SHARE this note so that the co-signing nurse can place an eSignature**    Nurse 2 eSignature: Electronically signed by Yokasta Venegas RN on 4/10/24 at 2:40 AM EDT

## 2024-04-10 NOTE — PROGRESS NOTES
TRANSFER - IN REPORT:    Verbal report received from Vanda RN on Rozina Doss  being received from [facility] for urgent transfer      Report consisted of patient's Situation, Background, Assessment and   Recommendations(SBAR).     Information from the following report(s) Nurse Handoff Report, ED SBAR, Surgery Report, MAR, Cardiac Rhythm Sinus Sonu, Neuro Assessment, and Event Log was reviewed with the receiving nurse.    Opportunity for questions and clarification was provided.      Assessment completed upon patient's arrival to unit and care assumed.       2040 Patient transported to ICU by RN x 2 and RT from [facility]. Levo gtt infusing 10 mcg/min. Elroy NP at bedside. Orders received to obtain ABG.       2042 Levo gtt stopped per Elroy NP.    2100 Elroy Np at beside placing mervin.    2114 Mervin line placed. Using for central line access overnight per Elroy NP.      2210 Davis exchanged.

## 2024-04-10 NOTE — PROGRESS NOTES
intended for the qualitative detection of nucleic acids from SARS-CoV-2, Influenza A, and Influenza B in nasopharyngeal for use under the FDA's Emergency Use Authorization(EAU) only.     Fact sheet for Patients: https://www.fda.gov/media/426036/download  Fact sheet for Healthcare Providers: https://www.fda.fov/media/940679/download                  ICU DAILY CHECKLIST     Code Status: Full Code    DVT Prophylaxis: SCD's  T/L/D: Tubes: ETT and Orogastric Tube  Lines: Peripheral IV, Arterial Line, Central Line, and Mervin  Drains: Davis Catheter  SUP: Famotidine  Diet: Diet NPO   Activity Level: PT/OT  ABCDEF Bundle/Checklist Completed: Yes  Disposition: Stay in ICU  Multidisciplinary Rounds Completed: yes  Goals of Care Discussion/Palliative: Yes  Patient/Family Updated: Yes    HOSPITAL COURSE/DAILY EVENT LOG     04/09/2024: Admitted to [facility] ICU from [facility] ICU. S/p embolization w/ coiling of RP hemorrhage today.     SUBJECTIVE   Review of Systems   Unable to perform ROS: Intubated       OBJECTIVE   Physical Exam  Vitals and nursing note reviewed.   Constitutional:       General: She is not in acute distress.     Appearance: She is obese. She is ill-appearing.      Interventions: She is sedated, intubated and restrained.   HENT:      Head: Normocephalic and atraumatic.      Nose: Nose normal.      Mouth/Throat:      Mouth: Mucous membranes are dry.      Pharynx: Oropharynx is clear.   Eyes:      Extraocular Movements: Extraocular movements intact.      Conjunctiva/sclera: Conjunctivae normal.   Cardiovascular:      Rate and Rhythm: Normal rate and regular rhythm.   Pulmonary:      Effort: She is intubated.      Breath sounds: Rhonchi present.   Abdominal:      General: There is no distension.      Palpations: Abdomen is soft.      Comments: Right femoral angio site, no drainage or signs of infx   Skin:     General: Skin is warm and dry.   Neurological:      Comments: EDDI, intubated   Psychiatric:      Comments: EDDI  Decompressed by a Davis catheter.  BONES: No destructive bone lesion. Degenerative disc disease. Age-indeterminate  T12 compression deformity.  ABDOMINAL WALL: No mass or hernia.  ADDITIONAL COMMENTS: N/A    Impression  Enlarging right retroperitoneal hematoma with active bleeding.    Findings discussed with Dr. Finley by Anthony Warren at approximately 3:16 p.m. on  4/9/2024.    Most Recent MRI  MRI PELVIS WO CONTRAST 04/03/2024    Narrative  EXAM:  MRI PELVIS WO CONTRAST    INDICATION: Hip discomfort    COMPARISON: CT abdomen pelvis 4/12/2023, pelvis and bilateral hip radiographs  4/8/2023    TECHNIQUE: Axial, coronal, and sagittal MRI of the pelvis in the T1, T2, and  inversion-recovery pulse sequences with and without fat saturation .    CONTRAST: None.    FINDINGS:  Study significantly degraded by motion.  Bone marrow: No fracture, dislocation, or marrow replacing process. No evidence  of stress reaction or periostitis.    Joint fluid: Small left hip joint effusion. Small bilateral iliopsoas tendon  sheath/bursal effusion/bursitis.    Tendons: Left gluteus minimus tendon partial tear. Remainder grossly intact.    Muscles: Within normal limits.    Neurovascular bundles: Within normal limits.    Articulations: Moderate right and mild left sacroiliac joint osteoarthritis.  Mild pubic symphysis arthropathy. Severe right and moderate left hip joint  osteoarthritis..    Soft tissues: Patchy soft tissue edema overlying the bilateral hips, right worse  than left.    Other: Davis catheter. Hysterectomy. Trace pelvic free fluid.    Impression  1.  Study significantly degraded by motion.  2.  No acute fracture.  3.  Degenerative joint disease as outlined above.  4.  LEFT gluteus minimus tendon partial tear.  5.  Incidental findings as above.  6.  MRI lumbar spine report dictated separately.    Most Recent Echo  04/01/24    ECHO (TTE) LIMITED (PRN CONTRAST/BUBBLE/STRAIN/3D) 04/09/2024  6:33 PM (Final)    Interpretation

## 2024-04-10 NOTE — CONSULTS
NEPHROLOGY CONSULT NOTE     Patient: Rozina Doss MRN: [id number]  PCP: Alvin Kaiser DO   :     1945  Age:   78 y.o.  Sex:  female      Referring physician: Dawson Thibodeaux MD  Reason for consultation: 78 y.o. female with Hemorrhagic shock (HCC) [R57.8] complicated by ARIC   Admission Date: 2024  6:16 PM  LOS: 1 day      ASSESSMENT and PLAN :   ARIC on CKD IV:  - 2/2 ATN from hemorrhagic shock  - now anuric, volume overloaded  - plan CRRT today  - line in place  - daughter consented over the phone, risks and benefits explained and she is ok to proceed  - factor 25-50cc/hr  - BID labs for now    CKD IV:  - baseline Cr around 2.5  - f/b [facility]    Hyperkalemia/acidosis:  - will manage with RRT    Hemorrhagic shock:  - transfused 3 units  - on pressors    RP bleed s/p coil ebmolization     DM2  Obesity  HTN     Active Problems / Assessment AAActive  :   Principal Problem:    Hemorrhagic shock (HCC)  Resolved Problems:    * No resolved hospital problems. *       Subjective:   HPI: Rozina Doss is a 78 y.o.  female who has been admitted to the hospital for RP hemorrhage.  She has CKD 4, f/b nephrology at [facility],  DM2, HTN, CHF.  Presented to [facility] ER on  with resp failure, HF symptoms.  Found to have a drop in hgb to 3.9 and was found to have a large RP bleed on CTA from .  Transfused 3 units, on pressors.  IR embolized multiple lumbar and RP arteries on . Baseline Cr around 2.5, up to 4.5, K 5.2, acidosis, anuric since arrival.  On levophed.  Hgb stable.  Sedated on the vent.    Past Medical Hx:   Past Medical History:   Diagnosis Date    Diabetes (HCC)     Hypertension     Tobacco use 2023    Type 2 diabetes mellitus (HCC) 2023        Past Surgical Hx:     Past Surgical History:   Procedure Laterality Date    IR VASC EMBOLIZE OCCLUDE ARTERY  2024    IR VASC EMBOLIZE OCCLUDE ARTERY 2024 [facility] RAD [facility] IR  follow patient. Please don’t hesitate to call with any questions    Maicol Ewing MD  4/10/2024  [facility] [address], [facility] [place] [address]  Phone - [phone]   Fax - [phone]  [email or web address]

## 2024-04-10 NOTE — PROGRESS NOTES
[facility] visit.  Mrs. Doss is Orthodoxy.  She is unable to talk due to medical restrictions.  Her daughter was with her. Prayer offered with her daughter and spiritual communion offered for Mrs. Doss and her daughter received communion.  Her daughter did not speak much and was teary eyed. Let her know about Mass on ch 41 and she may want to put it on  for her mother to hear.  During the prayer her mother kept moving her feet.  Assured her of continued prayer and will plan to visit on Monday.     Sr. JACE Carolina, RN, ACSW, LCSW   Page:  [phone]

## 2024-04-11 LAB
ALBUMIN SERPL-MCNC: 3.3 G/DL (ref 3.5–5)
ALBUMIN SERPL-MCNC: 3.4 G/DL (ref 3.5–5)
ALBUMIN SERPL-MCNC: 3.6 G/DL (ref 3.5–5)
ALBUMIN SERPL-MCNC: 3.9 G/DL (ref 3.5–5)
ALBUMIN/GLOB SERPL: 1.9 (ref 1.1–2.2)
ALP SERPL-CCNC: 83 U/L (ref 45–117)
ALT SERPL-CCNC: 2260 U/L (ref 12–78)
ANION GAP SERPL CALC-SCNC: 10 MMOL/L (ref 5–15)
ANION GAP SERPL CALC-SCNC: 12 MMOL/L (ref 5–15)
ANION GAP SERPL CALC-SCNC: 13 MMOL/L (ref 5–15)
ANION GAP SERPL CALC-SCNC: 8 MMOL/L (ref 5–15)
AST SERPL-CCNC: >2000 U/L (ref 15–37)
BASOPHILS # BLD: 0 K/UL (ref 0–0.1)
BASOPHILS NFR BLD: 0 % (ref 0–1)
BILIRUB DIRECT SERPL-MCNC: 0.6 MG/DL (ref 0–0.2)
BILIRUB SERPL-MCNC: 1.4 MG/DL (ref 0.2–1)
BUN SERPL-MCNC: 41 MG/DL (ref 6–20)
BUN SERPL-MCNC: 42 MG/DL (ref 6–20)
BUN SERPL-MCNC: 51 MG/DL (ref 6–20)
BUN SERPL-MCNC: 64 MG/DL (ref 6–20)
BUN/CREAT SERPL: 15 (ref 12–20)
BUN/CREAT SERPL: 17 (ref 12–20)
BUN/CREAT SERPL: 18 (ref 12–20)
BUN/CREAT SERPL: 20 (ref 12–20)
CALCIUM SERPL-MCNC: 8.7 MG/DL (ref 8.5–10.1)
CALCIUM SERPL-MCNC: 8.8 MG/DL (ref 8.5–10.1)
CALCIUM SERPL-MCNC: 8.9 MG/DL (ref 8.5–10.1)
CALCIUM SERPL-MCNC: 9.1 MG/DL (ref 8.5–10.1)
CHLORIDE SERPL-SCNC: 100 MMOL/L (ref 97–108)
CHLORIDE SERPL-SCNC: 100 MMOL/L (ref 97–108)
CHLORIDE SERPL-SCNC: 102 MMOL/L (ref 97–108)
CHLORIDE SERPL-SCNC: 104 MMOL/L (ref 97–108)
CO2 SERPL-SCNC: 20 MMOL/L (ref 21–32)
CO2 SERPL-SCNC: 22 MMOL/L (ref 21–32)
CO2 SERPL-SCNC: 23 MMOL/L (ref 21–32)
CO2 SERPL-SCNC: 23 MMOL/L (ref 21–32)
CREAT SERPL-MCNC: 2.54 MG/DL (ref 0.55–1.02)
CREAT SERPL-MCNC: 2.73 MG/DL (ref 0.55–1.02)
CREAT SERPL-MCNC: 2.8 MG/DL (ref 0.55–1.02)
CREAT SERPL-MCNC: 3.27 MG/DL (ref 0.55–1.02)
DIFFERENTIAL METHOD BLD: ABNORMAL
EOSINOPHIL # BLD: 0 K/UL (ref 0–0.4)
EOSINOPHIL NFR BLD: 0 % (ref 0–7)
ERYTHROCYTE [DISTWIDTH] IN BLOOD BY AUTOMATED COUNT: 14.6 % (ref 11.5–14.5)
GLOBULIN SER CALC-MCNC: 1.9 G/DL (ref 2–4)
GLUCOSE BLD STRIP.AUTO-MCNC: 134 MG/DL (ref 65–117)
GLUCOSE BLD STRIP.AUTO-MCNC: 140 MG/DL (ref 65–117)
GLUCOSE BLD STRIP.AUTO-MCNC: 146 MG/DL (ref 65–117)
GLUCOSE BLD STRIP.AUTO-MCNC: 149 MG/DL (ref 65–117)
GLUCOSE SERPL-MCNC: 135 MG/DL (ref 65–100)
GLUCOSE SERPL-MCNC: 136 MG/DL (ref 65–100)
GLUCOSE SERPL-MCNC: 141 MG/DL (ref 65–100)
GLUCOSE SERPL-MCNC: 154 MG/DL (ref 65–100)
HCT VFR BLD AUTO: 19.9 % (ref 35–47)
HCT VFR BLD AUTO: 20 % (ref 35–47)
HCT VFR BLD AUTO: 21 % (ref 35–47)
HCT VFR BLD AUTO: 23.2 % (ref 35–47)
HGB BLD-MCNC: 7 G/DL (ref 11.5–16)
HGB BLD-MCNC: 7.2 G/DL (ref 11.5–16)
HGB BLD-MCNC: 7.7 G/DL (ref 11.5–16)
HGB BLD-MCNC: 8.1 G/DL (ref 11.5–16)
IMM GRANULOCYTES # BLD AUTO: 0 K/UL
IMM GRANULOCYTES NFR BLD AUTO: 0 %
LYMPHOCYTES # BLD: 0.9 K/UL (ref 0.8–3.5)
LYMPHOCYTES NFR BLD: 7 % (ref 12–49)
MAGNESIUM SERPL-MCNC: 2.4 MG/DL (ref 1.6–2.4)
MCH RBC QN AUTO: 30.2 PG (ref 26–34)
MCHC RBC AUTO-ENTMCNC: 34.9 G/DL (ref 30–36.5)
MCV RBC AUTO: 86.6 FL (ref 80–99)
METAMYELOCYTES NFR BLD MANUAL: 1 %
MONOCYTES # BLD: 0.8 K/UL (ref 0–1)
MONOCYTES NFR BLD: 6 % (ref 5–13)
NEUTS BAND NFR BLD MANUAL: 11 % (ref 0–6)
NEUTS SEG # BLD: 10.9 K/UL (ref 1.8–8)
NEUTS SEG NFR BLD: 75 % (ref 32–75)
NRBC # BLD: 0.19 K/UL (ref 0–0.01)
NRBC BLD-RTO: 1.5 PER 100 WBC
PHOSPHATE SERPL-MCNC: 3.3 MG/DL (ref 2.6–4.7)
PHOSPHATE SERPL-MCNC: 3.6 MG/DL (ref 2.6–4.7)
PHOSPHATE SERPL-MCNC: 3.8 MG/DL (ref 2.6–4.7)
PHOSPHATE SERPL-MCNC: 4.1 MG/DL (ref 2.6–4.7)
PLATELET # BLD AUTO: 94 K/UL (ref 150–400)
PMV BLD AUTO: 11.8 FL (ref 8.9–12.9)
POTASSIUM SERPL-SCNC: 3.9 MMOL/L (ref 3.5–5.1)
POTASSIUM SERPL-SCNC: 4 MMOL/L (ref 3.5–5.1)
POTASSIUM SERPL-SCNC: 4.1 MMOL/L (ref 3.5–5.1)
POTASSIUM SERPL-SCNC: 4.4 MMOL/L (ref 3.5–5.1)
PROT SERPL-MCNC: 5.5 G/DL (ref 6.4–8.2)
RBC # BLD AUTO: 2.68 M/UL (ref 3.8–5.2)
RBC MORPH BLD: ABNORMAL
SERVICE CMNT-IMP: ABNORMAL
SODIUM SERPL-SCNC: 133 MMOL/L (ref 136–145)
SODIUM SERPL-SCNC: 134 MMOL/L (ref 136–145)
SODIUM SERPL-SCNC: 135 MMOL/L (ref 136–145)
SODIUM SERPL-SCNC: 135 MMOL/L (ref 136–145)
WBC # BLD AUTO: 12.7 K/UL (ref 3.6–11)

## 2024-04-11 PROCEDURE — 6360000002 HC RX W HCPCS: Performed by: STUDENT IN AN ORGANIZED HEALTH CARE EDUCATION/TRAINING PROGRAM

## 2024-04-11 PROCEDURE — 2500000003 HC RX 250 WO HCPCS: Performed by: STUDENT IN AN ORGANIZED HEALTH CARE EDUCATION/TRAINING PROGRAM

## 2024-04-11 PROCEDURE — 83735 ASSAY OF MAGNESIUM: CPT

## 2024-04-11 PROCEDURE — 2500000003 HC RX 250 WO HCPCS: Performed by: NURSE PRACTITIONER

## 2024-04-11 PROCEDURE — 82962 GLUCOSE BLOOD TEST: CPT

## 2024-04-11 PROCEDURE — 84100 ASSAY OF PHOSPHORUS: CPT

## 2024-04-11 PROCEDURE — 36415 COLL VENOUS BLD VENIPUNCTURE: CPT

## 2024-04-11 PROCEDURE — 80069 RENAL FUNCTION PANEL: CPT

## 2024-04-11 PROCEDURE — 80076 HEPATIC FUNCTION PANEL: CPT

## 2024-04-11 PROCEDURE — 80048 BASIC METABOLIC PNL TOTAL CA: CPT

## 2024-04-11 PROCEDURE — 2000000000 HC ICU R&B

## 2024-04-11 PROCEDURE — 6360000002 HC RX W HCPCS: Performed by: NURSE PRACTITIONER

## 2024-04-11 PROCEDURE — 85014 HEMATOCRIT: CPT

## 2024-04-11 PROCEDURE — 36430 TRANSFUSION BLD/BLD COMPNT: CPT

## 2024-04-11 PROCEDURE — 85025 COMPLETE CBC W/AUTO DIFF WBC: CPT

## 2024-04-11 PROCEDURE — 03HY32Z INSERTION OF MONITORING DEVICE INTO UPPER ARTERY, PERCUTANEOUS APPROACH: ICD-10-PCS | Performed by: INTERNAL MEDICINE

## 2024-04-11 PROCEDURE — 2580000003 HC RX 258: Performed by: NURSE PRACTITIONER

## 2024-04-11 PROCEDURE — A4216 STERILE WATER/SALINE, 10 ML: HCPCS | Performed by: NURSE PRACTITIONER

## 2024-04-11 PROCEDURE — 94003 VENT MGMT INPAT SUBQ DAY: CPT

## 2024-04-11 PROCEDURE — 6370000000 HC RX 637 (ALT 250 FOR IP): Performed by: NURSE PRACTITIONER

## 2024-04-11 PROCEDURE — 85018 HEMOGLOBIN: CPT

## 2024-04-11 PROCEDURE — P9016 RBC LEUKOCYTES REDUCED: HCPCS

## 2024-04-11 PROCEDURE — 2580000003 HC RX 258: Performed by: STUDENT IN AN ORGANIZED HEALTH CARE EDUCATION/TRAINING PROGRAM

## 2024-04-11 PROCEDURE — P9047 ALBUMIN (HUMAN), 25%, 50ML: HCPCS | Performed by: NURSE PRACTITIONER

## 2024-04-11 PROCEDURE — 90945 DIALYSIS ONE EVALUATION: CPT

## 2024-04-11 PROCEDURE — 2580000003 HC RX 258: Performed by: INTERNAL MEDICINE

## 2024-04-11 RX ORDER — SODIUM CHLORIDE, SODIUM LACTATE, POTASSIUM CHLORIDE, AND CALCIUM CHLORIDE .6; .31; .03; .02 G/100ML; G/100ML; G/100ML; G/100ML
500 INJECTION, SOLUTION INTRAVENOUS ONCE
Status: COMPLETED | OUTPATIENT
Start: 2024-04-11 | End: 2024-04-11

## 2024-04-11 RX ORDER — NOREPINEPHRINE BITARTRATE 0.06 MG/ML
1-50 INJECTION, SOLUTION INTRAVENOUS CONTINUOUS
Status: DISCONTINUED | OUTPATIENT
Start: 2024-04-11 | End: 2024-04-14

## 2024-04-11 RX ADMIN — PROPOFOL 30 MCG/KG/MIN: 10 INJECTION, EMULSION INTRAVENOUS at 07:38

## 2024-04-11 RX ADMIN — PROPOFOL 30 MCG/KG/MIN: 10 INJECTION, EMULSION INTRAVENOUS at 01:59

## 2024-04-11 RX ADMIN — PROPOFOL 25 MCG/KG/MIN: 10 INJECTION, EMULSION INTRAVENOUS at 15:53

## 2024-04-11 RX ADMIN — HEPARIN SODIUM 1100 UNITS: 1000 INJECTION INTRAVENOUS; SUBCUTANEOUS at 15:03

## 2024-04-11 RX ADMIN — CALCIUM CHLORIDE, MAGNESIUM CHLORIDE, DEXTROSE MONOHYDRATE, LACTIC ACID, SODIUM CHLORIDE, SODIUM BICARBONATE AND POTASSIUM CHLORIDE: 3.68; 3.05; 22; 5.4; 6.46; 3.09; .314 INJECTION INTRAVENOUS at 09:25

## 2024-04-11 RX ADMIN — CHLORHEXIDINE GLUCONATE 15 ML: 1.2 RINSE ORAL at 20:39

## 2024-04-11 RX ADMIN — Medication 50 MCG/HR: at 05:57

## 2024-04-11 RX ADMIN — POLYETHYLENE GLYCOL 3350 17 G: 17 POWDER, FOR SOLUTION ORAL at 13:54

## 2024-04-11 RX ADMIN — FAMOTIDINE 20 MG: 10 INJECTION INTRAVENOUS at 08:52

## 2024-04-11 RX ADMIN — ALBUMIN (HUMAN) 25 G: 0.25 INJECTION, SOLUTION INTRAVENOUS at 04:10

## 2024-04-11 RX ADMIN — CALCIUM CHLORIDE, MAGNESIUM CHLORIDE, DEXTROSE MONOHYDRATE, LACTIC ACID, SODIUM CHLORIDE, SODIUM BICARBONATE AND POTASSIUM CHLORIDE: 3.68; 3.05; 22; 5.4; 6.46; 3.09; .314 INJECTION INTRAVENOUS at 05:53

## 2024-04-11 RX ADMIN — SODIUM CHLORIDE, PRESERVATIVE FREE 10 ML: 5 INJECTION INTRAVENOUS at 20:39

## 2024-04-11 RX ADMIN — HEPARIN SODIUM 1400 UNITS: 1000 INJECTION INTRAVENOUS; SUBCUTANEOUS at 15:03

## 2024-04-11 RX ADMIN — CHLORHEXIDINE GLUCONATE 15 ML: 1.2 RINSE ORAL at 08:31

## 2024-04-11 RX ADMIN — SODIUM CHLORIDE, POTASSIUM CHLORIDE, SODIUM LACTATE AND CALCIUM CHLORIDE 500 ML: 600; 310; 30; 20 INJECTION, SOLUTION INTRAVENOUS at 12:03

## 2024-04-11 RX ADMIN — FENTANYL CITRATE 50 MCG: 0.05 INJECTION, SOLUTION INTRAMUSCULAR; INTRAVENOUS at 06:52

## 2024-04-11 RX ADMIN — SODIUM CHLORIDE, PRESERVATIVE FREE 10 ML: 5 INJECTION INTRAVENOUS at 08:52

## 2024-04-11 RX ADMIN — CALCIUM CHLORIDE, MAGNESIUM CHLORIDE, DEXTROSE MONOHYDRATE, LACTIC ACID, SODIUM CHLORIDE, SODIUM BICARBONATE AND POTASSIUM CHLORIDE: 3.68; 3.05; 22; 5.4; 6.46; 3.09; .314 INJECTION INTRAVENOUS at 00:32

## 2024-04-11 RX ADMIN — ALBUMIN (HUMAN) 25 G: 0.25 INJECTION, SOLUTION INTRAVENOUS at 09:29

## 2024-04-11 RX ADMIN — SODIUM CHLORIDE 10 MCG/MIN: 9 INJECTION, SOLUTION INTRAVENOUS at 12:00

## 2024-04-11 ASSESSMENT — PAIN SCALES - GENERAL
PAINLEVEL_OUTOF10: 0

## 2024-04-11 ASSESSMENT — PULMONARY FUNCTION TESTS
PIF_VALUE: 33
PIF_VALUE: 21
PIF_VALUE: 21
PIF_VALUE: 33
PIF_VALUE: 30
PIF_VALUE: 31

## 2024-04-11 NOTE — PROCEDURES
PROCEDURE NOTE  Date: 4/11/2024   Name: Rozina Doss  YOB: 1945    Insert Arterial Line    Date/Time: 4/11/2024 2:22 PM    Performed by: Ivelisse Torres APRN - NP  Authorized by: Ivelisse Torres APRN - NP  Required items: required blood products, implants, devices, and special equipment available  Patient identity confirmed: hospital-assigned identification number  Time out: Immediately prior to procedure a \"time out\" was called to verify the correct patient, procedure, equipment, support staff and site/side marked as required.  Preparation: Patient was prepped and draped in the usual sterile fashion.  Indications: multiple ABGs, respiratory failure and hemodynamic monitoring  Location: right radial  Anesthesia: local infiltration    Anesthesia:  Local Anesthetic: lidocaine 1% without epinephrine  Seldinger technique: Seldinger technique used  Number of attempts: 2  Post-procedure: line sutured and dressing applied  Post-procedure CMS: normal  Patient tolerance: patient tolerated the procedure well with no immediate complications

## 2024-04-11 NOTE — PROGRESS NOTES
2000: Pts right side of abdomen noted to be more distended on hand-off assessment. Elroy NP made aware and at bedside to assess.     2345: Pts with a hgb of 6.8 Elroy NP made aware and orders received to transfuse 1 u PRBC.    0010: 1 u PRBC transfusing.    0300: Transfusion complete. Will recheck H & H in one hour per protocol.    0600: Pts right side of abdomen noted to have an small increase in distention and firmness. Pts BP remains stable on levo gtt at 4 mcg/min and Hgb has improved from 6.8 to 8.1 after 1 u PRBC earlier in the shift. Elroy NP made aware and at bedside to assess. Will continue to trend H & H q 6 hours.

## 2024-04-11 NOTE — PROGRESS NOTES
CRITICAL CARE NOTE      Name: Rozina Doss   : 1945   MRN: [id number]   Date: 2024      REASON FOR ICU ADMISSION:  Hypovolemic shock 2/2 retroperitoneal hemorrhage    PRINCIPAL ICU DIAGNOSIS     Hypovolemic shock 2/2 retroperitoneal hemorrhage  ARIC on RRT    BRIEF PATIENT SUMMARY     Rozina Doss is a 79 y/o female w/ PMHx CKD stage 3, T2DM, HTN, CHF who presented to [facility] ED on  initially w/ c/o cough, difficulty walking, generalized body aches, malaise. She developed flash pulmonary edema after 500ml IVF bolus and required BIPAP. She was subsequently admitted to OSH ICU for acute hypoxic respiratory failure and flash pulmonary edema 2/2 left heart failure. Today  she became acutely unresponsive and significantly hypotensive, hypothermic, bradycardic w/ severe decrease in hgb w/ no identified source of active bleeding. Hgb dropped from 7.4 -> 3.9 in 72hrs. She was intubated at OSH for airway protection, started on NE, and transfused w/ 3 units PRBC's and 2 units FFP. Arrives to [facility] ICU from IR on MV s/p embolectomy w/ coiling. Started on CRRT for ARIC    COMPREHENSIVE ASSESSMENT & PLAN:SYSTEM BASED     24 HOUR EVENTS: Received 1u pRBC and CVC, Jyothi o/n. Remains on low dose levophed. More awake this AM. PSV trial. Remains on CRRT.    NEUROLOGICAL:   - Sedation: Propofol gtt  - Analgesia: Fentanyl  - Close neurologic monitoring, RASS goal 0 to -1  - CT head negative for acute findings  - PT/OT/SLP when appropriate  - Daily SAT    PULMONOLOGY:   Acute respiratory failure w/o hypoxia  - Continue lung protective ventilation  - Wean MV as tolerated  - Famotidine for SUP  - Daily SBT   - VAP prevention protocol     CARDIOVASCULAR:   H/o HTN  - Holding home antihypertensives d/t hypotension  - Tele  - Levophed for MAP >65  - IVF and blood product resucitation    GASTROINTESTINAL   Hypovolemic shock 2/2 retroperitoneal hemorrhage  - S/p embolectomy w/ coiling   - Pre-procedure  measures 10.8 x 11.6  x 17.0 cm (AP x TR x CC), previously 9.6 x 11.6 x 16.1 cm, and contains  approximately 1100 cc blood, previously 960 cc. There is a focus of active  bleeding, which appears to be arising from the right psoas muscle (series 4,  image 80).  REPRODUCTIVE ORGANS: Unremarkable uterus.  URINARY BLADDER: Decompressed by a Davis catheter.  BONES: No destructive bone lesion. Degenerative disc disease. Age-indeterminate  T12 compression deformity.  ABDOMINAL WALL: No mass or hernia.  ADDITIONAL COMMENTS: N/A    Impression  Enlarging right retroperitoneal hematoma with active bleeding.    Findings discussed with Dr. Finley by Anthony Warren at approximately 3:16 p.m. on  4/9/2024.    Most Recent MRI  MRI PELVIS WO CONTRAST 04/03/2024    Narrative  EXAM:  MRI PELVIS WO CONTRAST    INDICATION: Hip discomfort    COMPARISON: CT abdomen pelvis 4/12/2023, pelvis and bilateral hip radiographs  4/8/2023    TECHNIQUE: Axial, coronal, and sagittal MRI of the pelvis in the T1, T2, and  inversion-recovery pulse sequences with and without fat saturation .    CONTRAST: None.    FINDINGS:  Study significantly degraded by motion.  Bone marrow: No fracture, dislocation, or marrow replacing process. No evidence  of stress reaction or periostitis.    Joint fluid: Small left hip joint effusion. Small bilateral iliopsoas tendon  sheath/bursal effusion/bursitis.    Tendons: Left gluteus minimus tendon partial tear. Remainder grossly intact.    Muscles: Within normal limits.    Neurovascular bundles: Within normal limits.    Articulations: Moderate right and mild left sacroiliac joint osteoarthritis.  Mild pubic symphysis arthropathy. Severe right and moderate left hip joint  osteoarthritis..    Soft tissues: Patchy soft tissue edema overlying the bilateral hips, right worse  than left.    Other: Davis catheter. Hysterectomy. Trace pelvic free fluid.    Impression  1.  Study significantly degraded by motion.  2.  No acute

## 2024-04-11 NOTE — CARE COORDINATION
Transition of Care Plan:    RUR: 25% High   Prior Level of Functioning: Independent   Disposition: SNF  Transportation at discharge: BLS   IM/IMM Medicare/ letter given: No  Is patient a  and connected with [facility]? No  Caregiver Contact: Daughter Hyacinth Schaeffer [phone]  Discharge Caregiver contacted prior to discharge? No  Care Conference needed? No  Barriers to discharge:    Patient transferred from [facility] for higher level of care. Per notes plan was to discharge to [facility].  Patient admitted with Hypovolemic shock 2/2 retroperitoneal hemorrhage.  Patient intubated on propofol gtt  CRRT  Transfused 1 unit PRBC's for hgb 6.1  Care management is continuing to follow.  Nel Gonzales RN,Care Management

## 2024-04-11 NOTE — PROCEDURES
Procedure Note - Arterial Venous Access:   Performed by Leihg Kraft, [facility] NP Student  Supervised by CHANG Bar-BC    Diagnosis: Hemorrhagic shock  Insertion Date: 04/10/24  Time:10:18 PM   Obtained Consent? yes; emergent   Procedure Location:  ICU.      Immediately prior to the procedure, the patient was reevaluated and found suitable for the planned procedure and any planned medications.  Immediately prior to the procedure a time out was called to verify the correct patient, procedure, equipment, staff, and marking as appropriate.  Connor test to check for collateral flow.     Line Bundle:  Full sterile barrier precautions used.  5 mL 1% Lidocaine placed at insertion site.      The site was prepped with ChloraPrep.   Catheter inserted into a new site.     Using Seldinger technique a arterial catheter was placed in the right radial artery with 1 number of attempts for hemodynamic monitoring.    Ultrasound Guidance was utilized.    There was good red pulsatile blood return with waveform on monitor.   Femoral Site? no. If Yes, reason femoral site was chosen: N/A  Catheter secured. Biopatch/CHG bio-occlusive dressing in place? yes.   Complications encountered? no  The procedure was tolerated well.    Note drafted by Leigh Kraft, [facility] NP Student, under my direct supervision.     BENJI [facility]  Critical Care Medicine  [facility]

## 2024-04-11 NOTE — PLAN OF CARE
Problem: Safety - Medical Restraint  Goal: Remains free of injury from restraints (Restraint for Interference with Medical Device)  Description: INTERVENTIONS:  1. Determine that other, less restrictive measures have been tried or would not be effective before applying the restraint  2. Evaluate the patient's condition at the time of restraint application  3. Inform patient/family regarding the reason for restraint  4. Q2H: Monitor safety, psychosocial status, comfort, nutrition and hydration  Outcome: Not Progressing  Flowsheets (Taken 4/9/2024 2226 by Ti Diaz RN)  Remains free of injury from restraints (restraint for interference with medical device):   Determine that other, less restrictive measures have been tried or would not be effective before applying the restraint   Inform patient/family regarding the reason for restraint   Evaluate the patient's condition at the time of restraint application   Every 2 hours: Monitor safety, psychosocial status, comfort, nutrition and hydration     Problem: Safety - Adult  Goal: Free from fall injury  Outcome: Progressing  Flowsheets (Taken 4/10/2024 1844)  Free From Fall Injury: Instruct family/caregiver on patient safety     Problem: Discharge Planning  Goal: Discharge to home or other facility with appropriate resources  Outcome: Not Progressing  Flowsheets (Taken 4/11/2024 1804)  Discharge to home or other facility with appropriate resources: Identify barriers to discharge with patient and caregiver     Problem: Pain  Goal: Verbalizes/displays adequate comfort level or baseline comfort level  Outcome: Progressing  Flowsheets (Taken 4/10/2024 1844)  Verbalizes/displays adequate comfort level or baseline comfort level:   Assess pain using appropriate pain scale   Administer analgesics based on type and severity of pain and evaluate response   Implement non-pharmacological measures as appropriate and evaluate response     Problem: Chronic Conditions and

## 2024-04-11 NOTE — PROCEDURES
Procedure Note - Central Venous Access:   Performed by Leigh Kraft, [facility] NP Student  Supervised by CHANG Bar-BC    Diagnosis: Hemorrhagic shock  Insertion Date: 04/10/24  Time:11:54 PM   Obtained Consent? yes; emergent   Procedure Location:  ICU.      Immediately prior to the procedure, the patient was reevaluated and found suitable for the planned procedure and any planned medications.  Immediately prior to the procedure a time out was called to verify the correct patient, procedure, equipment, staff, and marking as appropriate.    Central line Bundle:  Full sterile barrier precautions used.  7-Step Sterility Protocol followed.  (cap, mask sterile gown, sterile gloves, large sterile sheet, hand hygiene, 2% chlorhexidine for cutaneous antisepsis)  5 mL 1% Lidocaine placed at insertion site.      Patient positioned in Trendelenburg?yes   The site was prepped with ChloraPrep.   Catheter inserted into a new site.     Using Seldinger technique a Triple Lumen CVC was placed in the Left, Femoral Vein via direct cannulation with 1 number of attempts for Blood Drawing and IV Access.    Ultrasound Guidance was utilized.    There was good dark, non-pulsatile blood return in all ports.  Femoral Site? yes. If Yes, reason femoral site was chosen: existing right IJ Mervin, too positional for left IJ attempt  Catheter secured. Biopatch/CHG bio-occlusive dressing in place? yes.   The following complications were encountered: None.  A follow-up chest x-ray ordered post procedure.    The procedure was tolerated well.    Note drafted by Leigh Kraft, [facility] NP Student, under my direct supervision.     Elroy Nava [facility]  Critical Care Medicine  [facility]

## 2024-04-11 NOTE — PROGRESS NOTES
[address], [facility] [place] [address]  Phone: [phone]   [phone]    [email or web address]     Nephrology Progress Note    Patient Name : Rozina Doss      : 1945     MRN : [id number]  Date of Admission : 2024  Date of Servive : 24    CC:  Follow up for ARIC on CKD       Assessment and Plan   ARIC on CKD IV:  - 2/2 ATN from hemorrhagic shock  - cont CRRT  - factor of 50cc/hr  - BID labs     CKD IV:  - baseline Cr around 2.5  - f/b [facility]     Hyperkalemia/acidosis:  - resolved     Hemorrhagic shock:  - transfused if hgb < 7  - on pressors     Shock liver:  - trend LFTs    RP bleed s/p coil ebmolization      DM2  Obesity  HTN     Interval History:  Seen and examined on CRRT.  Factor 25/hr for now.  On pressors.  Anuric.  Hgb holding stable.  Remains sedated on the vent.    Review of Systems: Pertinent items are noted in HPI.    Current Medications:   Current Facility-Administered Medications   Medication Dose Route Frequency    albumin human 25% IV solution 25 g  25 g IntraVENous Q6H    prismaSol BGK 4/2.5 dialysis solution   Dialysis Continuous    0.9 % sodium chloride infusion   IntraVENous Continuous    glucose chewable tablet 16 g  4 tablet Oral PRN    dextrose bolus 10% 125 mL  125 mL IntraVENous PRN    Or    dextrose bolus 10% 250 mL  250 mL IntraVENous PRN    glucagon injection 1 mg  1 mg SubCUTAneous PRN    dextrose 10 % infusion   IntraVENous Continuous PRN    insulin lispro (HUMALOG) injection vial 0-8 Units  0-8 Units SubCUTAneous Q6H    0.9 % sodium chloride infusion   IntraVENous PRN    fentaNYL (SUBLIMAZE) 1,000 mcg in sodium chloride 0.9% 100 mL infusion   mcg/hr IntraVENous Continuous    calcium chloride 10 % injection 1,000 mg  1,000 mg IntraVENous PRN    sodium chloride flush 0.9 % injection 5-40 mL  5-40 mL IntraVENous 2 times per day    sodium chloride flush 0.9 % injection  during assessment.    Labs:  Recent Labs     04/10/24  0200 04/10/24  2242 04/11/24  0404   * 134* 133*   K 5.2* 4.1 3.9   CL 97 100 100   CO2 18* 22 20*   GLUCOSE 247* 135* 141*   BUN 88* 64* 51*   CREATININE 4.27* 3.27* 2.80*   CALCIUM 8.2* 8.7 8.9       Recent Labs     04/09/24  2035 04/10/24  0200 04/10/24  0952 04/10/24  1758 04/10/24  2242 04/11/24  0404   WBC 14.1* 15.0*  --   --   --  12.7*   RBC 2.45* 2.30*  --   --   --  2.68*   HGB 7.7* 7.2*   < > 7.3* 6.8* 8.1*   HCT 21.6* 19.6*   < > 19.8* 19.0* 23.2*   MCV 88.2 85.2  --   --   --  86.6   MCH 31.4 31.3  --   --   --  30.2   MCHC 35.6 36.7*  --   --   --  34.9   RDW 15.4* 15.5*  --   --   --  14.6*   * 132*  --   --   --  94*   MPV 10.7 11.4  --   --   --  11.8    < > = values in this interval not displayed.     Recent Labs     04/09/24  1840 04/10/24  0200 04/11/24  0404   GLOB 2.9 2.4 1.9*     Recent Labs     04/09/24  1553 04/09/24  1840 04/09/24  2045   INR 1.3* 1.2* 1.3*      No results for input(s): \"CPK\", \"CKMB\", \"TROPONINI\" in the last 72 hours.    Invalid input(s): \"B-NP\"  Invalid input(s): \"PHI\", \"PCO2I\", \"PO2I\", \"FIO2I\"     Ventilator:       Microbiology:  No results found for: \"SDES\"  No components found for: \"CULT\"      I have reviewed the flowsheets.  Chart and Pertinent Notes have been reviewed.   No change in PMH ,family and social history from Consult note.      Maicol Ewing MD  [facility]

## 2024-04-11 NOTE — FLOWSHEET NOTE
Primary RN SBAR: MELISSA Glez RN  Patient Education: Procedural  Hospital associated wait time; reason: none  Hepatitis B Surface Ag   Date/Time Value Ref Range Status   04/10/2023 06:49 AM <0.10 <1.00 Index Final     Hep B S Ab   Date/Time Value Ref Range Status   04/10/2023 06:49 AM <3.10 (L) >10.0 mIU/mL Final       04/11/24 1255   Vital Signs   BP (!) 141/41   Pulse 89   Respirations 25   SpO2 91 %   Pain Assessment   Pain Assessment Critical Care Pain Observation Tool (CPOT)   Pain Level 0   Observations & Evaluations   Level of Consciousness 2   Oriented X EDDI   Heart Rhythm Regular   Respiratory Quality/Effort Unlabored   O2 Device Ventilator   Skin Color Ecchymosis (comment)   Skin Condition/Temp Dry;Warm;Fragile   Edema Right upper extremity;Left upper extremity;Right lower extremity;Left lower extremity   Edema Generalized +1   Technical Checks   All Connections Secure Yes   ICEBOAT I;C;E;B;O;A;T        04/11/24 1255   Treatment   $CRRT $Yes   Machine #   ([id number])   Cartridge Lot #   ([id number])   Therapy Type CVVH   Pressures   Access (mmHg) (!) -25 mmHg   Return/Venous (mmHg) (!) 24 mmHg   Effluent (mmHg) (!) -29 mmHg   Filter (mmHg) 106 mmHg   TMP Pressure (mmHg) 80 mmHg   Pressure Drop (mmHg) 46 mmHg   Deaeration Chamber Check Yes   Flow Rates   Therapy Fluid (L/hr) 1.9 L/hr   Blood Flow (mL/min) 180 mL/min   Replacement Fluid Pre-Filter (mL/hr) 470 mL/hr   Replacement Filter Post-Filter (mL/hr) 470 mL/hr   Pre-Blood Pump (mL/hr) 946 mL/hr   System Used   System Used Isabelle   Isabelle Calculation   (D) Physician Ordered Hourly Removal (mL) 25 ml   CRRT Activities   Intervention Ongoing  (daily rounding)   Ultrafiltrate Assessment   Ultrafiltrate Color Yellow/straw   Ultrafiltrate Appearance Clear   Hemodialysis Central Access Right Neck   Placement Date/Time: 04/09/24 2130   Present on Admission/Arrival: No  Orientation: Right  Access Location: Neck   Continued need for line? Yes   Site Assessment Clean, dry  & intact   Venous Lumen Status Infusing   Arterial Lumen Status Infusing   Line Care Ports disinfected;Connections checked and tightened   Dressing Type Sterile dressing, transparent;Bacteriocidal   Date of Last Dressing Change 04/09/24   Dressing Status Clean, dry & intact   Dressing Change Due 04/12/24     In at bedside to assess HF-1000 filter, running well with no indication for change at this time. Labs, notes, orders and code status reviewed. All connections visible/secure with thermax blood warmer set at 37*C. Education & pre/post report with primary RN.

## 2024-04-11 NOTE — PROGRESS NOTES
Patient receiving CRRT treatment . Access line alarming frequently with alert \" access extremely negative\". Patient sedation was increased to decrease potential alarms from moving related to negative access . Patient was repositioned and attempted additional trouble shooting . 2nd RN called into room to assist . Unable to fix alarm . Dr. Thibodeaux at bedside. States to return blood and discontinue CRRT for today . Unable to complete blood return with additional alarms on CRRT machine. Dr. Thibodeaux aware, states will inform Dr. Espinal as well.. Patient may need new line placement for additional HD therapy. Orders to pull HD line obtained. Family called and updated .

## 2024-04-12 ENCOUNTER — APPOINTMENT (OUTPATIENT)
Facility: HOSPITAL | Age: 79
DRG: 356 | End: 2024-04-12
Payer: MEDICARE

## 2024-04-12 LAB
ABO + RH BLD: NORMAL
ALBUMIN SERPL-MCNC: 2.7 G/DL (ref 3.5–5)
ALBUMIN SERPL-MCNC: 3.1 G/DL (ref 3.5–5)
ALBUMIN SERPL-MCNC: 3.2 G/DL (ref 3.5–5)
ALBUMIN/GLOB SERPL: 1.4 (ref 1.1–2.2)
ALP SERPL-CCNC: 87 U/L (ref 45–117)
ALT SERPL-CCNC: 1299 U/L (ref 12–78)
ANION GAP SERPL CALC-SCNC: 10 MMOL/L (ref 5–15)
ANION GAP SERPL CALC-SCNC: 8 MMOL/L (ref 5–15)
ANION GAP SERPL CALC-SCNC: 8 MMOL/L (ref 5–15)
AST SERPL-CCNC: 834 U/L (ref 15–37)
BASOPHILS # BLD: 0 K/UL (ref 0–0.1)
BASOPHILS NFR BLD: 0 % (ref 0–1)
BILIRUB DIRECT SERPL-MCNC: 0.6 MG/DL (ref 0–0.2)
BILIRUB SERPL-MCNC: 1.3 MG/DL (ref 0.2–1)
BLD PROD TYP BPU: NORMAL
BLOOD BANK BLOOD PRODUCT EXPIRATION DATE: NORMAL
BLOOD BANK BLOOD PRODUCT EXPIRATION DATE: NORMAL
BLOOD BANK DISPENSE STATUS: NORMAL
BLOOD BANK ISBT PRODUCT BLOOD TYPE: 6200
BLOOD BANK ISBT PRODUCT BLOOD TYPE: 6200
BLOOD BANK PRODUCT CODE: NORMAL
BLOOD BANK PRODUCT CODE: NORMAL
BLOOD BANK UNIT TYPE AND RH: NORMAL
BLOOD BANK UNIT TYPE AND RH: NORMAL
BLOOD GROUP ANTIBODIES SERPL: NORMAL
BPU ID: NORMAL
BUN SERPL-MCNC: 40 MG/DL (ref 6–20)
BUN SERPL-MCNC: 43 MG/DL (ref 6–20)
BUN SERPL-MCNC: 45 MG/DL (ref 6–20)
BUN/CREAT SERPL: 14 (ref 12–20)
CALCIUM SERPL-MCNC: 8.6 MG/DL (ref 8.5–10.1)
CALCIUM SERPL-MCNC: 8.7 MG/DL (ref 8.5–10.1)
CALCIUM SERPL-MCNC: 8.8 MG/DL (ref 8.5–10.1)
CHLORIDE SERPL-SCNC: 103 MMOL/L (ref 97–108)
CHLORIDE SERPL-SCNC: 103 MMOL/L (ref 97–108)
CHLORIDE SERPL-SCNC: 106 MMOL/L (ref 97–108)
CO2 SERPL-SCNC: 20 MMOL/L (ref 21–32)
CO2 SERPL-SCNC: 21 MMOL/L (ref 21–32)
CO2 SERPL-SCNC: 24 MMOL/L (ref 21–32)
CREAT SERPL-MCNC: 2.85 MG/DL (ref 0.55–1.02)
CREAT SERPL-MCNC: 2.98 MG/DL (ref 0.55–1.02)
CREAT SERPL-MCNC: 3.27 MG/DL (ref 0.55–1.02)
CROSSMATCH RESULT: NORMAL
DIFFERENTIAL METHOD BLD: ABNORMAL
EOSINOPHIL # BLD: 0 K/UL (ref 0–0.4)
EOSINOPHIL NFR BLD: 0 % (ref 0–7)
ERYTHROCYTE [DISTWIDTH] IN BLOOD BY AUTOMATED COUNT: 16 % (ref 11.5–14.5)
GLOBULIN SER CALC-MCNC: 2.3 G/DL (ref 2–4)
GLUCOSE BLD STRIP.AUTO-MCNC: 127 MG/DL (ref 65–117)
GLUCOSE BLD STRIP.AUTO-MCNC: 130 MG/DL (ref 65–117)
GLUCOSE BLD STRIP.AUTO-MCNC: 133 MG/DL (ref 65–117)
GLUCOSE BLD STRIP.AUTO-MCNC: 138 MG/DL (ref 65–117)
GLUCOSE SERPL-MCNC: 117 MG/DL (ref 65–100)
GLUCOSE SERPL-MCNC: 126 MG/DL (ref 65–100)
GLUCOSE SERPL-MCNC: 134 MG/DL (ref 65–100)
HCT VFR BLD AUTO: 19.6 % (ref 35–47)
HCT VFR BLD AUTO: 19.8 % (ref 35–47)
HCT VFR BLD AUTO: 20.2 % (ref 35–47)
HGB BLD-MCNC: 6.4 G/DL (ref 11.5–16)
HGB BLD-MCNC: 7 G/DL (ref 11.5–16)
HGB BLD-MCNC: 7.1 G/DL (ref 11.5–16)
IMM GRANULOCYTES # BLD AUTO: 0 K/UL
IMM GRANULOCYTES NFR BLD AUTO: 0 %
LACTATE SERPL-SCNC: 1 MMOL/L (ref 0.4–2)
LYMPHOCYTES # BLD: 0.7 K/UL (ref 0.8–3.5)
LYMPHOCYTES NFR BLD: 5 % (ref 12–49)
MAGNESIUM SERPL-MCNC: 2.5 MG/DL (ref 1.6–2.4)
MCH RBC QN AUTO: 31.3 PG (ref 26–34)
MCHC RBC AUTO-ENTMCNC: 35.1 G/DL (ref 30–36.5)
MCV RBC AUTO: 89 FL (ref 80–99)
METAMYELOCYTES NFR BLD MANUAL: 3 %
MONOCYTES # BLD: 0.6 K/UL (ref 0–1)
MONOCYTES NFR BLD: 4 % (ref 5–13)
NEUTS BAND NFR BLD MANUAL: 12 % (ref 0–6)
NEUTS SEG # BLD: 12.1 K/UL (ref 1.8–8)
NEUTS SEG NFR BLD: 76 % (ref 32–75)
NRBC # BLD: 0.52 K/UL (ref 0–0.01)
NRBC BLD-RTO: 3.8 PER 100 WBC
PHOSPHATE SERPL-MCNC: 3.7 MG/DL (ref 2.6–4.7)
PHOSPHATE SERPL-MCNC: 4.1 MG/DL (ref 2.6–4.7)
PHOSPHATE SERPL-MCNC: 4.7 MG/DL (ref 2.6–4.7)
PLATELET # BLD AUTO: 76 K/UL (ref 150–400)
PMV BLD AUTO: 11.8 FL (ref 8.9–12.9)
POTASSIUM SERPL-SCNC: 4.6 MMOL/L (ref 3.5–5.1)
POTASSIUM SERPL-SCNC: 4.6 MMOL/L (ref 3.5–5.1)
POTASSIUM SERPL-SCNC: 4.7 MMOL/L (ref 3.5–5.1)
PROT SERPL-MCNC: 5.5 G/DL (ref 6.4–8.2)
RBC # BLD AUTO: 2.27 M/UL (ref 3.8–5.2)
RBC MORPH BLD: ABNORMAL
SERVICE CMNT-IMP: ABNORMAL
SODIUM SERPL-SCNC: 133 MMOL/L (ref 136–145)
SODIUM SERPL-SCNC: 135 MMOL/L (ref 136–145)
SODIUM SERPL-SCNC: 135 MMOL/L (ref 136–145)
SPECIMEN EXP DATE BLD: NORMAL
UNIT DIVISION: 0
UNIT ISSUE DATE/TIME: NORMAL
UNIT ISSUE DATE/TIME: NORMAL
WBC # BLD AUTO: 13.8 K/UL (ref 3.6–11)

## 2024-04-12 PROCEDURE — 82962 GLUCOSE BLOOD TEST: CPT

## 2024-04-12 PROCEDURE — 6360000002 HC RX W HCPCS: Performed by: STUDENT IN AN ORGANIZED HEALTH CARE EDUCATION/TRAINING PROGRAM

## 2024-04-12 PROCEDURE — 2500000003 HC RX 250 WO HCPCS: Performed by: NURSE PRACTITIONER

## 2024-04-12 PROCEDURE — 6360000002 HC RX W HCPCS: Performed by: NURSE PRACTITIONER

## 2024-04-12 PROCEDURE — 6370000000 HC RX 637 (ALT 250 FOR IP): Performed by: NURSE PRACTITIONER

## 2024-04-12 PROCEDURE — 6370000000 HC RX 637 (ALT 250 FOR IP): Performed by: STUDENT IN AN ORGANIZED HEALTH CARE EDUCATION/TRAINING PROGRAM

## 2024-04-12 PROCEDURE — 2580000003 HC RX 258: Performed by: NURSE PRACTITIONER

## 2024-04-12 PROCEDURE — 87088 URINE BACTERIA CULTURE: CPT

## 2024-04-12 PROCEDURE — 36415 COLL VENOUS BLD VENIPUNCTURE: CPT

## 2024-04-12 PROCEDURE — 83605 ASSAY OF LACTIC ACID: CPT

## 2024-04-12 PROCEDURE — 90945 DIALYSIS ONE EVALUATION: CPT

## 2024-04-12 PROCEDURE — 94003 VENT MGMT INPAT SUBQ DAY: CPT

## 2024-04-12 PROCEDURE — 87086 URINE CULTURE/COLONY COUNT: CPT

## 2024-04-12 PROCEDURE — 87077 CULTURE AEROBIC IDENTIFY: CPT

## 2024-04-12 PROCEDURE — A4216 STERILE WATER/SALINE, 10 ML: HCPCS | Performed by: NURSE PRACTITIONER

## 2024-04-12 PROCEDURE — 85018 HEMOGLOBIN: CPT

## 2024-04-12 PROCEDURE — 87070 CULTURE OTHR SPECIMN AEROBIC: CPT

## 2024-04-12 PROCEDURE — 2500000003 HC RX 250 WO HCPCS: Performed by: STUDENT IN AN ORGANIZED HEALTH CARE EDUCATION/TRAINING PROGRAM

## 2024-04-12 PROCEDURE — 80069 RENAL FUNCTION PANEL: CPT

## 2024-04-12 PROCEDURE — 85025 COMPLETE CBC W/AUTO DIFF WBC: CPT

## 2024-04-12 PROCEDURE — 74018 RADEX ABDOMEN 1 VIEW: CPT

## 2024-04-12 PROCEDURE — 71045 X-RAY EXAM CHEST 1 VIEW: CPT

## 2024-04-12 PROCEDURE — 87040 BLOOD CULTURE FOR BACTERIA: CPT

## 2024-04-12 PROCEDURE — 80048 BASIC METABOLIC PNL TOTAL CA: CPT

## 2024-04-12 PROCEDURE — 36556 INSERT NON-TUNNEL CV CATH: CPT

## 2024-04-12 PROCEDURE — 87205 SMEAR GRAM STAIN: CPT

## 2024-04-12 PROCEDURE — 83735 ASSAY OF MAGNESIUM: CPT

## 2024-04-12 PROCEDURE — 05HM33Z INSERTION OF INFUSION DEVICE INTO RIGHT INTERNAL JUGULAR VEIN, PERCUTANEOUS APPROACH: ICD-10-PCS | Performed by: INTERNAL MEDICINE

## 2024-04-12 PROCEDURE — 84100 ASSAY OF PHOSPHORUS: CPT

## 2024-04-12 PROCEDURE — 2000000000 HC ICU R&B

## 2024-04-12 PROCEDURE — 80076 HEPATIC FUNCTION PANEL: CPT

## 2024-04-12 PROCEDURE — 2580000003 HC RX 258: Performed by: INTERNAL MEDICINE

## 2024-04-12 PROCEDURE — 87186 SC STD MICRODIL/AGAR DIL: CPT

## 2024-04-12 PROCEDURE — 2580000003 HC RX 258: Performed by: STUDENT IN AN ORGANIZED HEALTH CARE EDUCATION/TRAINING PROGRAM

## 2024-04-12 PROCEDURE — 85014 HEMATOCRIT: CPT

## 2024-04-12 RX ORDER — DEXMEDETOMIDINE HYDROCHLORIDE 4 UG/ML
.1-1.5 INJECTION, SOLUTION INTRAVENOUS CONTINUOUS
Status: DISCONTINUED | OUTPATIENT
Start: 2024-04-12 | End: 2024-04-16

## 2024-04-12 RX ORDER — SODIUM CHLORIDE, SODIUM LACTATE, POTASSIUM CHLORIDE, AND CALCIUM CHLORIDE .6; .31; .03; .02 G/100ML; G/100ML; G/100ML; G/100ML
500 INJECTION, SOLUTION INTRAVENOUS ONCE
Status: DISCONTINUED | OUTPATIENT
Start: 2024-04-12 | End: 2024-04-12

## 2024-04-12 RX ORDER — FENTANYL CITRATE 50 UG/ML
50 INJECTION, SOLUTION INTRAMUSCULAR; INTRAVENOUS ONCE
Status: DISCONTINUED | OUTPATIENT
Start: 2024-04-12 | End: 2024-04-15

## 2024-04-12 RX ORDER — MIDODRINE HYDROCHLORIDE 5 MG/1
10 TABLET ORAL
Status: DISCONTINUED | OUTPATIENT
Start: 2024-04-12 | End: 2024-04-13

## 2024-04-12 RX ORDER — SODIUM CHLORIDE, SODIUM LACTATE, POTASSIUM CHLORIDE, AND CALCIUM CHLORIDE .6; .31; .03; .02 G/100ML; G/100ML; G/100ML; G/100ML
1000 INJECTION, SOLUTION INTRAVENOUS ONCE
Status: COMPLETED | OUTPATIENT
Start: 2024-04-12 | End: 2024-04-12

## 2024-04-12 RX ADMIN — MIDODRINE HYDROCHLORIDE 10 MG: 5 TABLET ORAL at 12:27

## 2024-04-12 RX ADMIN — VANCOMYCIN HYDROCHLORIDE 1750 MG: 10 INJECTION, POWDER, LYOPHILIZED, FOR SOLUTION INTRAVENOUS at 17:53

## 2024-04-12 RX ADMIN — CHLORHEXIDINE GLUCONATE 15 ML: 1.2 RINSE ORAL at 20:07

## 2024-04-12 RX ADMIN — CALCIUM CHLORIDE, MAGNESIUM CHLORIDE, DEXTROSE MONOHYDRATE, LACTIC ACID, SODIUM CHLORIDE, SODIUM BICARBONATE AND POTASSIUM CHLORIDE: 3.68; 3.05; 22; 5.4; 6.46; 3.09; .314 INJECTION INTRAVENOUS at 14:17

## 2024-04-12 RX ADMIN — PROPOFOL 25 MCG/KG/MIN: 10 INJECTION, EMULSION INTRAVENOUS at 00:06

## 2024-04-12 RX ADMIN — CALCIUM CHLORIDE, MAGNESIUM CHLORIDE, DEXTROSE MONOHYDRATE, LACTIC ACID, SODIUM CHLORIDE, SODIUM BICARBONATE AND POTASSIUM CHLORIDE: 3.68; 3.05; 22; 5.4; 6.46; 3.09; .314 INJECTION INTRAVENOUS at 22:59

## 2024-04-12 RX ADMIN — HEPARIN SODIUM 1400 UNITS: 1000 INJECTION INTRAVENOUS; SUBCUTANEOUS at 19:33

## 2024-04-12 RX ADMIN — MIDODRINE HYDROCHLORIDE 10 MG: 5 TABLET ORAL at 09:41

## 2024-04-12 RX ADMIN — SODIUM CHLORIDE, POTASSIUM CHLORIDE, SODIUM LACTATE AND CALCIUM CHLORIDE 1000 ML: 600; 310; 30; 20 INJECTION, SOLUTION INTRAVENOUS at 15:48

## 2024-04-12 RX ADMIN — PIPERACILLIN AND TAZOBACTAM 4500 MG: 4; .5 INJECTION, POWDER, FOR SOLUTION INTRAVENOUS at 17:46

## 2024-04-12 RX ADMIN — POLYETHYLENE GLYCOL 3350 17 G: 17 POWDER, FOR SOLUTION ORAL at 09:41

## 2024-04-12 RX ADMIN — MIDODRINE HYDROCHLORIDE 10 MG: 5 TABLET ORAL at 17:46

## 2024-04-12 RX ADMIN — HEPARIN SODIUM 1100 UNITS: 1000 INJECTION INTRAVENOUS; SUBCUTANEOUS at 19:32

## 2024-04-12 RX ADMIN — SODIUM CHLORIDE, PRESERVATIVE FREE 10 ML: 5 INJECTION INTRAVENOUS at 20:07

## 2024-04-12 RX ADMIN — DEXMEDETOMIDINE HYDROCHLORIDE 0.8 MCG/KG/HR: 400 INJECTION, SOLUTION INTRAVENOUS at 21:49

## 2024-04-12 RX ADMIN — DEXMEDETOMIDINE HYDROCHLORIDE 0.2 MCG/KG/HR: 400 INJECTION, SOLUTION INTRAVENOUS at 09:44

## 2024-04-12 RX ADMIN — PROPOFOL 25 MCG/KG/MIN: 10 INJECTION, EMULSION INTRAVENOUS at 07:00

## 2024-04-12 RX ADMIN — Medication 50 MCG/HR: at 03:37

## 2024-04-12 RX ADMIN — FENTANYL CITRATE 50 MCG: 50 INJECTION, SOLUTION INTRAMUSCULAR; INTRAVENOUS at 11:56

## 2024-04-12 RX ADMIN — CALCIUM CHLORIDE, MAGNESIUM CHLORIDE, DEXTROSE MONOHYDRATE, LACTIC ACID, SODIUM CHLORIDE, SODIUM BICARBONATE AND POTASSIUM CHLORIDE: 3.68; 3.05; 22; 5.4; 6.46; 3.09; .314 INJECTION INTRAVENOUS at 14:20

## 2024-04-12 RX ADMIN — Medication 50 MCG/HR: at 15:11

## 2024-04-12 RX ADMIN — CALCIUM CHLORIDE, MAGNESIUM CHLORIDE, DEXTROSE MONOHYDRATE, LACTIC ACID, SODIUM CHLORIDE, SODIUM BICARBONATE AND POTASSIUM CHLORIDE: 3.68; 3.05; 22; 5.4; 6.46; 3.09; .314 INJECTION INTRAVENOUS at 14:15

## 2024-04-12 RX ADMIN — CHLORHEXIDINE GLUCONATE 15 ML: 1.2 RINSE ORAL at 09:58

## 2024-04-12 RX ADMIN — SODIUM CHLORIDE, PRESERVATIVE FREE 10 ML: 5 INJECTION INTRAVENOUS at 09:41

## 2024-04-12 RX ADMIN — FAMOTIDINE 20 MG: 10 INJECTION INTRAVENOUS at 09:41

## 2024-04-12 ASSESSMENT — PAIN SCALES - GENERAL
PAINLEVEL_OUTOF10: 0

## 2024-04-12 ASSESSMENT — PULMONARY FUNCTION TESTS
PIF_VALUE: 35
PIF_VALUE: 36
PIF_VALUE: 20
PIF_VALUE: 21
PIF_VALUE: 31

## 2024-04-12 NOTE — PROGRESS NOTES
[address], [facility] [place] [address]  Phone: [phone]   [phone]    [email or web address]     Nephrology Progress Note    Patient Name : Rozina Doss      : 1945     MRN : [id number]  Date of Admission : 2024  Date of Servive : 24    CC:  Follow up for ARIC on CKD       Assessment and Plan   ARIC on CKD IV:  - 2/2 ATN from hemorrhagic shock  - line to be replaced today  - resume CVVH  - factor of 50-100cc/hr  - BID labs    CKD IV:  - baseline Cr around 2.5  - f/b [facility]     Hyperkalemia/acidosis:  - resolved     Hemorrhagic shock:  - transfused if hgb < 7  - on pressors     Shock liver:  - trend LFTs    RP bleed s/p coil ebmolization      DM2  Obesity  HTN     Interval History:  Seen and examined.  Off CRRT yesterday, line removed.  Will get another prabhakar today.  Anuric, on pressors.  Sedated on the vent    Review of Systems: Pertinent items are noted in HPI.    Current Medications:   Current Facility-Administered Medications   Medication Dose Route Frequency    midodrine (PROAMATINE) tablet 10 mg  10 mg Oral TID WC    dexmedeTOMIDine (PRECEDEX) 400 mcg in sodium chloride 0.9 % 100 mL infusion  0.1-1.5 mcg/kg/hr IntraVENous Continuous    norepinephrine (LEVOPHED) 16 mg in sodium chloride 0.9 % 250 mL infusion  1-50 mcg/min IntraVENous Continuous    heparin (porcine) 1000 UNIT/ML injection 1,400 Units  1,400 Units IntraCATHeter PRN    And    heparin (porcine) 1000 UNIT/ML injection 1,100 Units  1,100 Units IntraCATHeter PRN    prismaSol BGK 4/2.5 dialysis solution   Dialysis Continuous    glucose chewable tablet 16 g  4 tablet Oral PRN    dextrose bolus 10% 125 mL  125 mL IntraVENous PRN    Or    dextrose bolus 10% 250 mL  250 mL IntraVENous PRN    glucagon injection 1 mg  1 mg SubCUTAneous PRN    dextrose 10 % infusion   IntraVENous Continuous PRN    insulin lispro (HUMALOG) injection vial 0-8

## 2024-04-12 NOTE — FLOWSHEET NOTE
CRRT INITIATION    04/12/24 1530   Observations & Evaluations   Level of Consciousness 1   Heart Rhythm   (bedside telemetry)   Respiratory Quality/Effort Unlabored   O2 Device Ventilator   Skin Condition/Temp Dry   Abdomen Inspection Distended;Taut   Edema Generalized   Edema Generalized +1;Non-pitting   Vital Signs   Pulse 57   SpO2 98 %   Technical Checks   All Connections Secure Yes   ICEBOAT I;C;E;B;O;A;T   During Hemodialysis Assessment   ABP (Arterial line BP) (!) 110/31        04/12/24 1530   Treatment   $CRRT $Yes   Machine #   ([id number])   Cartridge Lot #   ([id number])   Therapy Type CVVH   Pressures   Access (mmHg) (!) -36 mmHg  (lines reversed for excessive access pressure)   Return/Venous (mmHg) (!) 33 mmHg   Effluent (mmHg) (!) 44 mmHg   Filter (mmHg) 110 mmHg   TMP Pressure (mmHg) 15 mmHg   Pressure Drop (mmHg) 41 mmHg   Deaeration Chamber Check Yes   Flow Rates   Therapy Fluid (L/hr) 2 L/hr  (25 ml/kg/hr  (77.8 x 1945 rounded to 2000))   Blood Flow (mL/min) 200 mL/min   Replacement Fluid Pre-Filter (mL/hr) 500 mL/hr   Replacement Filter Post-Filter (mL/hr) 500 mL/hr   Pre-Blood Pump (mL/hr) 1000 mL/hr   Non-CRRT Intake   IV/IVPB (mL) 28 mL   Total Non-CRRT Intake (Calculated) 28   Non-CRRT Output   Urine (mL) 0   Total Non-CRRT Output  (Calculated) 0 mL   System Used   System Used Isabelle   NxStage Calculation   (A) Total Non-NxStage Intake (mL) 28 mL   (C) Total Non-NxStage Output (mL) 0 mL/hr   Isabelle Calculation   (A) Total Non-Isabelle Intake (mL) 28 mL   (B) Total Non-Isabelle Output (mL) 0 mL   (C) Balance (A-B) 28 ml   (D) Physician Ordered Hourly Removal (mL) 25 ml   (E) Amount of UF removed last hour 0 ml   (F) Amount Programmed to Remove (H from last hour) 0   (G) Amount ahead or behind (E-F) 0   (H) Set the removal rate for the next hour (C+D-G) 53   (I) Actual Fluid Balance (C-E) 28   CRRT Activities   Temporary Disconnect Other  (restarted after new access)   Ultrafiltrate Assessment

## 2024-04-12 NOTE — PROGRESS NOTES
Comprehensive Nutrition Assessment    Type and Reason for Visit: Initial, NPO/Clear Liquid    Nutrition Recommendations/Plan:     -Consider EN support once medically appropriate    -Goal on propofol: Vital HP @ 40 ml/hr with 1 packet Prosource daily and 50 ml water flush q 4 hr        -Goal off propofol: Vital HP @ 55 ml/hr (no Prosource) and 50 ml water flush q 4 hr         Malnutrition Assessment:  Malnutrition Status:  Moderate malnutrition (04/12/24 1506)    Context:  Acute Illness     Findings of the 6 clinical characteristics of malnutrition:  Energy Intake:  50% or less of estimated energy requirements for 5 or more days  Weight Loss:  Unable to assess     Body Fat Loss:  No significant body fat loss     Muscle Mass Loss:  No significant muscle mass loss    Fluid Accumulation:  Mild Extremities, Generalized   Strength:  Not Performed       Nutrition Assessment:    Pt admitted from OSH d/t Hemorrhagic shock. PMHx: CDK 4, DM 2, HTN, CHF. Initially \"admitted to OSH ICU for acute hypoxic respiratory failure and flash pulmonary edema 2/2 left heart failure.\" Required intubation 4/9 d/t hypovolemic shock 2/2 retroperitoneal hemorrhage. Remains intubated and on pressor. ARIC-anuric/vol overload-started on CRRT 4/10; ?transition to IHD soon.    Discussed during IDR. Abdomen firm, distended, last BM 4/5 per nutrition note from OSH. Plan to check KUB today-?distention d/t RP bleed vs other. No output from NGT overnight.    If KUB negative-consider starting bowel regimen; hold of on EN support until pt has a BM. Propofol @ current rate provides 310 lipid calories per day. See above EN goals. First feeding will provide 880 ml, 960 calories, 96 gm protein and 1085 ml free water per day. Goal off propofol to provide 1210 ml, 1210 calories, 104 gm protein, 43 mEq potassium and 1300 ml free water (tube feeding/flush) per day to meet estimated needs.    Labs reviewed.     Nutritionally Significant Medications:  Fentanyl @

## 2024-04-12 NOTE — PROCEDURES
Procedure Note - Temporary HD Catheter Placement (Mervin):   Performed by Dawson Thibodeaux MD     Diagnosis: ARIC  Insertion Date: 04/12/24  Time:12:38 PM   Obtained Consent? yes; informed   Procedure Location:  ICU.      Immediately prior to the procedure, the patient was reevaluated and found suitable for the planned procedure and any planned medications.  Immediately prior to the procedure a time out was called to verify the correct patient, procedure, equipment, staff, and marking as appropriate.    Central line Bundle:  Full sterile barrier precautions used.  7-Step Sterility Protocol followed.  (cap, mask sterile gown, sterile gloves, large sterile sheet, hand hygiene, 2% chlorhexidine for cutaneous antisepsis)  5 mL 1% Lidocaine placed at insertion site.      Patient positioned in Trendelenburg?yes   The site was prepped with ChloraPrep and Sterile draping.   Catheter inserted into a new site.     Using Seldinger technique a Hemodialysis Catheter was placed in the Right, Internal Jugular Vein via direct cannulation with 1 number of attempts for Monitoring and Dialysis.    Ultrasound Guidance was utilized.    There was good dark, non-pulsatile blood return in all ports.  Femoral Site? no.   Catheter secured. Biopatch/CHG bio-occlusive dressing in place? yes.   The following complications were encountered: None.  A follow-up chest x-ray ordered post procedure.    The procedure was tolerated well.    Dawson Thibodeaux MD  Critical Care Medicine  [facility]

## 2024-04-12 NOTE — PROGRESS NOTES
Pharmacist Note - Vancomycin Dosing    Consult provided for this 78 y.o. female for indication of sepsis of unknown etiology.  Antibiotic regimen(s): Zosyn + vancomycin  Patient on vancomycin PTA? NO     Recent Labs     04/10/24  0200 04/10/24  2242 24  0404 24  0948 24  2056 24  0318 24  0952   WBC 15.0*  --  12.7*  --   --  13.8*  --    CREATININE 4.27*   < > 2.80*   < > 2.73* 2.98* 3.27*   BUN 88*   < > 51*   < > 41* 43* 45*    < > = values in this interval not displayed.     Frequency of BMP: Every 12 hours  Height: 157.5 cm  Weight: 77.8 kg  Est CrCl: CVVH   Temp (24hrs), Av.9 °F (36.6 °C), Min:97.3 °F (36.3 °C), Max:98.4 °F (36.9 °C)    Cultures:   Influenza A/B [PCR]: negative   SARS-CoV-2 [PCR]: negative   Urine: NG, final    MRSA Swab ordered (if applicable)? NO - will order    The plan below is expected to result in a target range of Trough 10-15 mcg/mL    Therapy will be initiated with a loading dose of 1750 mg IV x 1 to be followed by a maintenance dose of 1000 mg IV every 24 hours (while on CVVH).  Pharmacy to follow patient daily and order levels / make dose adjustments as appropriate.

## 2024-04-12 NOTE — PROGRESS NOTES
1110: Verbal orders from Carlos Eduardo CEJA for KUB and bladder pressure monitoring.    1150: Bladder pressure: 8    1155: 50mcg Fentanyl bolus given from Fentanyl gtt bag for procedure.    1200: H&H resulted: Hgb 7.0. Carlos Eduardo CEJA notified at bedside. Verbal orders from MD to adjust H&H trending to q12hr.    1210: R IJ Mervin placed at bedside by Carlos Eduardo CEJA.    1555: Noted wide pulse pressure on A-line, correlated with BP cuff. Vasopresser requirements increased over last hour. Carlos Eduardo CEJA notified and assessed at beside. Verbal orders for pan culture, balderrama exchange for urine culture, 1L LR bolus, vancomycin and zosyn. Consult placed to pharmacy for vancomycin dosing.    1935: CRRT clotted off. Unable to rinse back pt. R IJ Mervin heparin locked.    Shift note:  Pan cultures sent  Balderrama exchanged  CRRT initiated with factor of 25

## 2024-04-13 LAB
ALBUMIN SERPL-MCNC: 2.6 G/DL (ref 3.5–5)
ALBUMIN SERPL-MCNC: 2.6 G/DL (ref 3.5–5)
ALBUMIN/GLOB SERPL: 1 (ref 1.1–2.2)
ALP SERPL-CCNC: 88 U/L (ref 45–117)
ALT SERPL-CCNC: 853 U/L (ref 12–78)
ANION GAP SERPL CALC-SCNC: 7 MMOL/L (ref 5–15)
ANION GAP SERPL CALC-SCNC: 8 MMOL/L (ref 5–15)
AST SERPL-CCNC: 374 U/L (ref 15–37)
BASOPHILS # BLD: 0 K/UL (ref 0–0.1)
BASOPHILS NFR BLD: 0 % (ref 0–1)
BILIRUB DIRECT SERPL-MCNC: 0.7 MG/DL (ref 0–0.2)
BILIRUB SERPL-MCNC: 1.4 MG/DL (ref 0.2–1)
BUN SERPL-MCNC: 30 MG/DL (ref 6–20)
BUN SERPL-MCNC: 32 MG/DL (ref 6–20)
BUN/CREAT SERPL: 13 (ref 12–20)
BUN/CREAT SERPL: 13 (ref 12–20)
CALCIUM SERPL-MCNC: 8.6 MG/DL (ref 8.5–10.1)
CALCIUM SERPL-MCNC: 8.7 MG/DL (ref 8.5–10.1)
CHLORIDE SERPL-SCNC: 105 MMOL/L (ref 97–108)
CHLORIDE SERPL-SCNC: 107 MMOL/L (ref 97–108)
CO2 SERPL-SCNC: 23 MMOL/L (ref 21–32)
CO2 SERPL-SCNC: 24 MMOL/L (ref 21–32)
CREAT SERPL-MCNC: 2.24 MG/DL (ref 0.55–1.02)
CREAT SERPL-MCNC: 2.43 MG/DL (ref 0.55–1.02)
DIFFERENTIAL METHOD BLD: ABNORMAL
EKG ATRIAL RATE: 51 BPM
EKG DIAGNOSIS: NORMAL
EKG P AXIS: 57 DEGREES
EKG P-R INTERVAL: 174 MS
EKG Q-T INTERVAL: 514 MS
EKG QRS DURATION: 102 MS
EKG QTC CALCULATION (BAZETT): 473 MS
EKG R AXIS: 20 DEGREES
EKG T AXIS: 61 DEGREES
EKG VENTRICULAR RATE: 51 BPM
EOSINOPHIL # BLD: 0 K/UL (ref 0–0.4)
EOSINOPHIL NFR BLD: 0 % (ref 0–7)
ERYTHROCYTE [DISTWIDTH] IN BLOOD BY AUTOMATED COUNT: 15.5 % (ref 11.5–14.5)
GLOBULIN SER CALC-MCNC: 2.7 G/DL (ref 2–4)
GLUCOSE BLD STRIP.AUTO-MCNC: 101 MG/DL (ref 65–117)
GLUCOSE BLD STRIP.AUTO-MCNC: 112 MG/DL (ref 65–117)
GLUCOSE BLD STRIP.AUTO-MCNC: 112 MG/DL (ref 65–117)
GLUCOSE BLD STRIP.AUTO-MCNC: 122 MG/DL (ref 65–117)
GLUCOSE BLD STRIP.AUTO-MCNC: 126 MG/DL (ref 65–117)
GLUCOSE SERPL-MCNC: 107 MG/DL (ref 65–100)
GLUCOSE SERPL-MCNC: 116 MG/DL (ref 65–100)
HCT VFR BLD AUTO: 22.7 % (ref 35–47)
HCT VFR BLD AUTO: 23.8 % (ref 35–47)
HCT VFR BLD AUTO: 23.8 % (ref 35–47)
HGB BLD-MCNC: 7.6 G/DL (ref 11.5–16)
HGB BLD-MCNC: 7.7 G/DL (ref 11.5–16)
HGB BLD-MCNC: 7.9 G/DL (ref 11.5–16)
HISTORY CHECK: NORMAL
IMM GRANULOCYTES # BLD AUTO: 0 K/UL
IMM GRANULOCYTES NFR BLD AUTO: 0 %
LYMPHOCYTES # BLD: 0.3 K/UL (ref 0.8–3.5)
LYMPHOCYTES NFR BLD: 3 % (ref 12–49)
MAGNESIUM SERPL-MCNC: 2.4 MG/DL (ref 1.6–2.4)
MAGNESIUM SERPL-MCNC: 2.4 MG/DL (ref 1.6–2.4)
MCH RBC QN AUTO: 30.4 PG (ref 26–34)
MCHC RBC AUTO-ENTMCNC: 32.4 G/DL (ref 30–36.5)
MCV RBC AUTO: 94.1 FL (ref 80–99)
METAMYELOCYTES NFR BLD MANUAL: 1 %
MONOCYTES # BLD: 0.4 K/UL (ref 0–1)
MONOCYTES NFR BLD: 4 % (ref 5–13)
MYELOCYTES NFR BLD MANUAL: 1 %
NEUTS SEG # BLD: 9.3 K/UL (ref 1.8–8)
NEUTS SEG NFR BLD: 91 % (ref 32–75)
NRBC # BLD: 0.28 K/UL (ref 0–0.01)
NRBC BLD-RTO: 2.8 PER 100 WBC
PHOSPHATE SERPL-MCNC: 3 MG/DL (ref 2.6–4.7)
PHOSPHATE SERPL-MCNC: 3.1 MG/DL (ref 2.6–4.7)
PLATELET # BLD AUTO: 63 K/UL (ref 150–400)
PMV BLD AUTO: 11.8 FL (ref 8.9–12.9)
POTASSIUM SERPL-SCNC: 4.8 MMOL/L (ref 3.5–5.1)
POTASSIUM SERPL-SCNC: 4.8 MMOL/L (ref 3.5–5.1)
PROT SERPL-MCNC: 5.3 G/DL (ref 6.4–8.2)
RBC # BLD AUTO: 2.53 M/UL (ref 3.8–5.2)
RBC MORPH BLD: ABNORMAL
SERVICE CMNT-IMP: ABNORMAL
SERVICE CMNT-IMP: ABNORMAL
SERVICE CMNT-IMP: NORMAL
SODIUM SERPL-SCNC: 136 MMOL/L (ref 136–145)
SODIUM SERPL-SCNC: 138 MMOL/L (ref 136–145)
WBC # BLD AUTO: 10.2 K/UL (ref 3.6–11)

## 2024-04-13 PROCEDURE — 86900 BLOOD TYPING SEROLOGIC ABO: CPT

## 2024-04-13 PROCEDURE — 2500000003 HC RX 250 WO HCPCS: Performed by: STUDENT IN AN ORGANIZED HEALTH CARE EDUCATION/TRAINING PROGRAM

## 2024-04-13 PROCEDURE — 6370000000 HC RX 637 (ALT 250 FOR IP): Performed by: STUDENT IN AN ORGANIZED HEALTH CARE EDUCATION/TRAINING PROGRAM

## 2024-04-13 PROCEDURE — 85014 HEMATOCRIT: CPT

## 2024-04-13 PROCEDURE — 6370000000 HC RX 637 (ALT 250 FOR IP): Performed by: NURSE PRACTITIONER

## 2024-04-13 PROCEDURE — 6360000002 HC RX W HCPCS: Performed by: STUDENT IN AN ORGANIZED HEALTH CARE EDUCATION/TRAINING PROGRAM

## 2024-04-13 PROCEDURE — 83735 ASSAY OF MAGNESIUM: CPT

## 2024-04-13 PROCEDURE — 2580000003 HC RX 258: Performed by: INTERNAL MEDICINE

## 2024-04-13 PROCEDURE — 94003 VENT MGMT INPAT SUBQ DAY: CPT

## 2024-04-13 PROCEDURE — 82962 GLUCOSE BLOOD TEST: CPT

## 2024-04-13 PROCEDURE — 80069 RENAL FUNCTION PANEL: CPT

## 2024-04-13 PROCEDURE — 85025 COMPLETE CBC W/AUTO DIFF WBC: CPT

## 2024-04-13 PROCEDURE — 86923 COMPATIBILITY TEST ELECTRIC: CPT

## 2024-04-13 PROCEDURE — P9016 RBC LEUKOCYTES REDUCED: HCPCS

## 2024-04-13 PROCEDURE — 86901 BLOOD TYPING SEROLOGIC RH(D): CPT

## 2024-04-13 PROCEDURE — 2000000000 HC ICU R&B

## 2024-04-13 PROCEDURE — 93005 ELECTROCARDIOGRAM TRACING: CPT | Performed by: SURGERY

## 2024-04-13 PROCEDURE — 80076 HEPATIC FUNCTION PANEL: CPT

## 2024-04-13 PROCEDURE — 2500000003 HC RX 250 WO HCPCS: Performed by: NURSE PRACTITIONER

## 2024-04-13 PROCEDURE — 2580000003 HC RX 258: Performed by: NURSE PRACTITIONER

## 2024-04-13 PROCEDURE — 86850 RBC ANTIBODY SCREEN: CPT

## 2024-04-13 PROCEDURE — 2580000003 HC RX 258: Performed by: STUDENT IN AN ORGANIZED HEALTH CARE EDUCATION/TRAINING PROGRAM

## 2024-04-13 PROCEDURE — 36430 TRANSFUSION BLD/BLD COMPNT: CPT

## 2024-04-13 PROCEDURE — 80048 BASIC METABOLIC PNL TOTAL CA: CPT

## 2024-04-13 PROCEDURE — 85018 HEMOGLOBIN: CPT

## 2024-04-13 PROCEDURE — 36415 COLL VENOUS BLD VENIPUNCTURE: CPT

## 2024-04-13 PROCEDURE — 90945 DIALYSIS ONE EVALUATION: CPT

## 2024-04-13 PROCEDURE — 84100 ASSAY OF PHOSPHORUS: CPT

## 2024-04-13 PROCEDURE — A4216 STERILE WATER/SALINE, 10 ML: HCPCS | Performed by: NURSE PRACTITIONER

## 2024-04-13 RX ORDER — GINSENG 100 MG
CAPSULE ORAL ONCE
Status: COMPLETED | OUTPATIENT
Start: 2024-04-13 | End: 2024-04-13

## 2024-04-13 RX ADMIN — DEXMEDETOMIDINE HYDROCHLORIDE 0.8 MCG/KG/HR: 400 INJECTION, SOLUTION INTRAVENOUS at 03:41

## 2024-04-13 RX ADMIN — CALCIUM CHLORIDE, MAGNESIUM CHLORIDE, DEXTROSE MONOHYDRATE, LACTIC ACID, SODIUM CHLORIDE, SODIUM BICARBONATE AND POTASSIUM CHLORIDE: 3.68; 3.05; 22; 5.4; 6.46; 3.09; .314 INJECTION INTRAVENOUS at 04:14

## 2024-04-13 RX ADMIN — CALCIUM CHLORIDE, MAGNESIUM CHLORIDE, DEXTROSE MONOHYDRATE, LACTIC ACID, SODIUM CHLORIDE, SODIUM BICARBONATE AND POTASSIUM CHLORIDE: 3.68; 3.05; 22; 5.4; 6.46; 3.09; .314 INJECTION INTRAVENOUS at 19:24

## 2024-04-13 RX ADMIN — PIPERACILLIN AND TAZOBACTAM 3375 MG: 3; .375 INJECTION, POWDER, LYOPHILIZED, FOR SOLUTION INTRAVENOUS; PARENTERAL at 00:38

## 2024-04-13 RX ADMIN — DEXMEDETOMIDINE HYDROCHLORIDE 0.8 MCG/KG/HR: 400 INJECTION, SOLUTION INTRAVENOUS at 21:04

## 2024-04-13 RX ADMIN — Medication 50 MCG/HR: at 08:19

## 2024-04-13 RX ADMIN — CALCIUM CHLORIDE, MAGNESIUM CHLORIDE, DEXTROSE MONOHYDRATE, LACTIC ACID, SODIUM CHLORIDE, SODIUM BICARBONATE AND POTASSIUM CHLORIDE: 3.68; 3.05; 22; 5.4; 6.46; 3.09; .314 INJECTION INTRAVENOUS at 03:55

## 2024-04-13 RX ADMIN — PIPERACILLIN AND TAZOBACTAM 3375 MG: 3; .375 INJECTION, POWDER, LYOPHILIZED, FOR SOLUTION INTRAVENOUS; PARENTERAL at 15:56

## 2024-04-13 RX ADMIN — DEXMEDETOMIDINE HYDROCHLORIDE 0.8 MCG/KG/HR: 400 INJECTION, SOLUTION INTRAVENOUS at 14:27

## 2024-04-13 RX ADMIN — PIPERACILLIN AND TAZOBACTAM 3375 MG: 3; .375 INJECTION, POWDER, LYOPHILIZED, FOR SOLUTION INTRAVENOUS; PARENTERAL at 08:01

## 2024-04-13 RX ADMIN — CHLORHEXIDINE GLUCONATE 15 ML: 1.2 RINSE ORAL at 20:23

## 2024-04-13 RX ADMIN — CALCIUM CHLORIDE, MAGNESIUM CHLORIDE, DEXTROSE MONOHYDRATE, LACTIC ACID, SODIUM CHLORIDE, SODIUM BICARBONATE AND POTASSIUM CHLORIDE: 3.68; 3.05; 22; 5.4; 6.46; 3.09; .314 INJECTION INTRAVENOUS at 14:22

## 2024-04-13 RX ADMIN — SODIUM CHLORIDE, PRESERVATIVE FREE 10 ML: 5 INJECTION INTRAVENOUS at 20:24

## 2024-04-13 RX ADMIN — VANCOMYCIN HYDROCHLORIDE 1000 MG: 1 INJECTION, POWDER, LYOPHILIZED, FOR SOLUTION INTRAVENOUS at 18:07

## 2024-04-13 RX ADMIN — MIDODRINE HYDROCHLORIDE 10 MG: 5 TABLET ORAL at 07:58

## 2024-04-13 RX ADMIN — BACITRACIN: 500 OINTMENT TOPICAL at 04:38

## 2024-04-13 RX ADMIN — PIPERACILLIN AND TAZOBACTAM 3375 MG: 3; .375 INJECTION, POWDER, LYOPHILIZED, FOR SOLUTION INTRAVENOUS; PARENTERAL at 23:44

## 2024-04-13 RX ADMIN — CHLORHEXIDINE GLUCONATE 15 ML: 1.2 RINSE ORAL at 08:20

## 2024-04-13 RX ADMIN — CALCIUM CHLORIDE, MAGNESIUM CHLORIDE, DEXTROSE MONOHYDRATE, LACTIC ACID, SODIUM CHLORIDE, SODIUM BICARBONATE AND POTASSIUM CHLORIDE: 3.68; 3.05; 22; 5.4; 6.46; 3.09; .314 INJECTION INTRAVENOUS at 14:10

## 2024-04-13 RX ADMIN — CALCIUM CHLORIDE, MAGNESIUM CHLORIDE, DEXTROSE MONOHYDRATE, LACTIC ACID, SODIUM CHLORIDE, SODIUM BICARBONATE AND POTASSIUM CHLORIDE: 3.68; 3.05; 22; 5.4; 6.46; 3.09; .314 INJECTION INTRAVENOUS at 09:21

## 2024-04-13 RX ADMIN — CALCIUM CHLORIDE, MAGNESIUM CHLORIDE, DEXTROSE MONOHYDRATE, LACTIC ACID, SODIUM CHLORIDE, SODIUM BICARBONATE AND POTASSIUM CHLORIDE: 3.68; 3.05; 22; 5.4; 6.46; 3.09; .314 INJECTION INTRAVENOUS at 04:08

## 2024-04-13 RX ADMIN — SODIUM CHLORIDE, PRESERVATIVE FREE 10 ML: 5 INJECTION INTRAVENOUS at 08:20

## 2024-04-13 RX ADMIN — FAMOTIDINE 20 MG: 10 INJECTION INTRAVENOUS at 07:58

## 2024-04-13 RX ADMIN — DEXMEDETOMIDINE HYDROCHLORIDE 0.8 MCG/KG/HR: 400 INJECTION, SOLUTION INTRAVENOUS at 08:30

## 2024-04-13 RX ADMIN — CALCIUM CHLORIDE, MAGNESIUM CHLORIDE, DEXTROSE MONOHYDRATE, LACTIC ACID, SODIUM CHLORIDE, SODIUM BICARBONATE AND POTASSIUM CHLORIDE: 3.68; 3.05; 22; 5.4; 6.46; 3.09; .314 INJECTION INTRAVENOUS at 14:12

## 2024-04-13 ASSESSMENT — PAIN SCALES - GENERAL
PAINLEVEL_OUTOF10: 0

## 2024-04-13 ASSESSMENT — PULMONARY FUNCTION TESTS
PIF_VALUE: 21
PIF_VALUE: 33
PIF_VALUE: 32
PIF_VALUE: 31

## 2024-04-13 NOTE — PROGRESS NOTES
CRITICAL CARE NOTE      Name: Rozina Doss   : 1945   MRN: [id number]   Date: 2024      REASON FOR ICU ADMISSION:  Hypovolemic shock 2/2 retroperitoneal hemorrhage    PRINCIPAL ICU DIAGNOSIS   Hypovolemic shock 2/2 retroperitoneal hemorrhage  ARIC on RRT    BRIEF PATIENT SUMMARY     Rozina Doss is a 77 y/o female w/ PMHx CKD stage 3, T2DM, HTN, CHF who presented to [facility] ED on  initially w/ c/o cough, difficulty walking, generalized body aches, malaise. She developed flash pulmonary edema after 500ml IVF bolus and required BIPAP. She was subsequently admitted to OSH ICU for acute hypoxic respiratory failure and flash pulmonary edema 2/2 left heart failure. Today  she became acutely unresponsive and significantly hypotensive, hypothermic, bradycardic w/ severe decrease in hgb w/ no identified source of active bleeding. Hgb dropped from 7.4 -> 3.9 in 72hrs. She was intubated at OSH for airway protection, started on NE, and transfused w/ 3 units PRBC's and 2 units FFP. Arrives to [facility] ICU from IR on MV s/p embolectomy w/ coiling. Started on CRRT for ARIC and continuing blood and IVF resuscitation.     COMPREHENSIVE ASSESSMENT & PLAN:SYSTEM BASED     24 HOUR EVENTS: No acute o/n events, HD catheter removed due to positional nature causing issues w/ CRRT. Will discuss possibility of transition to iHD and timing of HD line replacement w/ nephrology. Remains on low mathis levophed.     Addendum 1700: Pt w/ increased secretions from ETT and vasopressor requirement. PanCx and broad abx.    NEUROLOGICAL:   Intubated and sedated  Continue to wean sedation as tolerated  Daily SAT    PULMONOLOGY: Acute respiratory failure w/o hypoxia   Continue monitor oxygenation status  Continue mechanical ventilation  Wean as tolerated  SAT  Daily SBT  Pulmonary hygiene  DuoNebs  Chest x-ray in process  CARDIOVASCULAR: History of Hypertension, congestive heart failure, status post embolectomy with Mauldin 9        COVID-19 & Influenza Combo [[id number]] Collected: 04/01/24 0125    Order Status: Completed Specimen: Nasopharyngeal Updated: 04/01/24 0202     SARS-CoV-2, PCR Not detected        Comment: Not Detected results do not preclude SARS-CoV-2 infection and should not be used as the sole basis for patient management decisions.Results must be combined with clinical observations, patient history, and epidemiological information.        Rapid Influenza A By PCR Not detected        Rapid Influenza B By PCR Not detected        Comment: Testing was performed using xenia Jillian SARS-CoV-2 and Influenza A/B nucleic acid assay.  This test is a multiplex Real-Time Reverse Transcriptase Polymerase Chain Reaction (RT-PCR) based in vitro diagnostic test intended for the qualitative detection of nucleic acids from SARS-CoV-2, Influenza A, and Influenza B in nasopharyngeal for use under the FDA's Emergency Use Authorization(EAU) only.     Fact sheet for Patients: https://www.fda.gov/media/543202/download  Fact sheet for Healthcare Providers: https://www.fda.fov/media/395055/download                  ICU DAILY CHECKLIST     Code Status: Full Code    DVT Prophylaxis: SCD's  T/L/D: Tubes: ETT and Orogastric Tube  Lines: CVC, PIV, Jyothi  Drains: Davis Catheter  SUP: Famotidine  Diet: Diet NPO   Activity Level: PT/OT  ABCDEF Bundle/Checklist Completed: Yes  Disposition: Stay in ICU  Multidisciplinary Rounds Completed: yes  Goals of Care Discussion/Palliative: Yes  Patient/Family Updated: Yes    HOSPITAL COURSE/DAILY EVENT LOG     04/09/2024: Admitted to [facility] ICU from [facility] ICU. S/p embolization w/ coiling of RP hemorrhage today.   4/10 Started on CRRT  4/11 HD line removed due to positional issues  4/13    SUBJECTIVE   Review of Systems   Unable to perform ROS: Intubated       OBJECTIVE   Physical Exam  Vitals and nursing note reviewed.   Constitutional:       General: She is not in acute distress.     Appearance: She is obese. She is

## 2024-04-13 NOTE — FLOWSHEET NOTE
CRRT / [phone]    Primary RN SBAR: Giana, RN  Patient Education provided: pt unable to educate - intubated/sedated, no family at bedside  Preferred Education method and Primary language: EDDI - intubated/sedated    Hepatitis B Surface Ag   Date/Time Value Ref Range Status   04/10/2023 06:49 AM <0.10 <1.00 Index Final     Hep B S Ab   Date/Time Value Ref Range Status   04/10/2023 06:49 AM <3.10 (L) >10.0 mIU/mL Final       04/12/24 2155   Treatment   $CRRT $Yes   Machine #   ([id number])   Cartridge Lot #   [id number])   Therapy Type CVVH   Pressures   Access (mmHg) (!) -32 mmHg   Return/Venous (mmHg) (!) 36 mmHg   Filter (mmHg) 116 mmHg   TMP Pressure (mmHg) 17 mmHg   Pressure Drop (mmHg) 43 mmHg   Deaeration Chamber Check Yes   Flow Rates   Therapy Fluid (L/hr) 2 L/hr   Blood Flow (mL/min) 200 mL/min   Replacement Fluid Pre-Filter (mL/hr) 500 mL/hr   Replacement Filter Post-Filter (mL/hr) 500 mL/hr   Pre-Blood Pump (mL/hr) 1000 mL/hr   System Used   System Used Isabelle   Isabelle Calculation   (D) Physician Ordered Hourly Removal (mL) 25 ml   CRRT Activities   Alarms Access pressures high   Intervention Initiated   Hemodialysis Central Access Right Neck   Placement Date/Time: 04/12/24 1210   Present on Admission/Arrival: No  Inserted by: Carlos Eduardo CEJA  Insertion Practices: Chlorohexadine skin antisepsis;Hand hygiene;Maximal barrier precautions;Optimal catheter site selection;Sterile ultrasound technique...   Continued need for line? Yes   Site Assessment Clean, dry & intact   Venous Lumen Status Infusing   Arterial Lumen Status Infusing   Line Care Connections checked and tightened;Ports disinfected   Dressing Type Bacteriocidal;Transparent   Date of Last Dressing Change 04/12/24   Dressing Status Clean, dry & intact   Dressing Change Due 04/16/24      Anh WHALEY at bedside to restart CRRT r/t 'extremely negative access pressures.'  Patient, code status, labs, and orders verified. Consents on chart. Time out

## 2024-04-13 NOTE — FLOWSHEET NOTE
CRRT ROUNDING   04/13/24 1715   Observations & Evaluations   Level of Consciousness 1   Respiratory Quality/Effort Unlabored   O2 Device Ventilator   Skin Color Ecchymosis (comment)   Skin Condition/Temp Dry   Abdomen Inspection Distended   Edema Generalized   Edema Generalized +1   RUE Edema +1   LUE Edema +1   RLE Edema +1   LLE Edema +1   Vital Signs   Pulse 67   Respirations 15   SpO2 96 %   Technical Checks   All Connections Secure Yes   ICEBOAT I;C;E;B;O;A;T        04/13/24 1715   Treatment   $CRRT $Yes   Machine #   ([id number])   Cartridge Lot #   ([id number])   Therapy Type CVVH   Pressures   Access (mmHg) (!) -34 mmHg   Return/Venous (mmHg) (!) 33 mmHg   Effluent (mmHg) (!) -14 mmHg   Filter (mmHg) 119 mmHg   TMP Pressure (mmHg) 78 mmHg   Pressure Drop (mmHg) 49 mmHg   Deaeration Chamber Check Yes   Flow Rates   Therapy Fluid (L/hr) 2 L/hr   Blood Flow (mL/min) 200 mL/min   Replacement Fluid Pre-Filter (mL/hr) 500 mL/hr   Replacement Filter Post-Filter (mL/hr) 500 mL/hr   Pre-Blood Pump (mL/hr) 1000 mL/hr   System Used   System Used Isabelle   Ultrafiltrate Assessment   Ultrafiltrate Color Yellow/straw   Ultrafiltrate Appearance Clear   Hemodialysis Central Access Right Neck   Placement Date/Time: 04/12/24 1210   Present on Admission/Arrival: No  Inserted by: Carlos Eduardo CEJA  Insertion Practices: Chlorohexadine skin antisepsis;Hand hygiene;Maximal barrier precautions;Optimal catheter site selection;Sterile ultrasound technique...   Continued need for line? Yes   Site Assessment Clean, dry & intact   Venous Lumen Status Infusing   Arterial Lumen Status Infusing   Line Care Connections checked and tightened   Dressing Type Bacteriocidal;Sterile dressing, transparent   Date of Last Dressing Change 04/12/24   Dressing Status Clean, dry & intact   Dressing Change Due 04/16/24     Primary RN SBAR: AUSTIN Payan, RN   Patient Education provided: will need followup intubated and sedated  Preferred Education method and Primary

## 2024-04-13 NOTE — PROGRESS NOTES
[address], [place] [address]  Phone: [phone]   [phone]    [email or web address] [employer] [facility] [employer]     Nephrology Progress Note    Patient Name : Rozina Doss      : 1945     MRN : [id number]  Date of Admission : 2024  Date of Servive : 24    CC:  Follow up for ARIC on CKD       Assessment and Plan   AIRC on CKD IV:  - 2/2 ATN from hemorrhagic shock  - line replaced    - continue CVVH  - factor of 50-100cc/hr  - BID labs    CKD IV:  - baseline Cr around 2.5  - f/b [facility]     Hyperkalemia/acidosis:  - resolved     Hemorrhagic shock:  - transfused if hgb < 7  - on pressors     Shock liver:  - trend LFTs    RP bleed s/p coil ebmolization      DM2  Obesity  HTN     Interval History:  Seen and examined.  On CRRT and tolerating it .Sedated on the vent    Review of Systems: Pertinent items are noted in HPI.    Current Medications:   Current Facility-Administered Medications   Medication Dose Route Frequency    midodrine (PROAMATINE) tablet 10 mg  10 mg Oral TID WC    dexmedeTOMIDine (PRECEDEX) 400 mcg in sodium chloride 0.9 % 100 mL infusion  0.1-1.5 mcg/kg/hr IntraVENous Continuous    fentaNYL (SUBLIMAZE) injection 50 mcg  50 mcg IntraVENous Once    Vancomycin - Pharmacy to Dose   Other RX Placeholder    vancomycin (VANCOCIN) 1,000 mg in sodium chloride 0.9 % 250 mL IVPB ([id number])  1,000 mg IntraVENous Q24H    piperacillin-tazobactam (ZOSYN) 3,375 mg in sodium chloride 0.9 % 50 mL IVPB (Epib1Dus)  3,375 mg IntraVENous Q8H    norepinephrine (LEVOPHED) 16 mg in sodium chloride 0.9 % 250 mL infusion  1-50 mcg/min IntraVENous Continuous    heparin (porcine) 1000 UNIT/ML injection 1,400 Units  1,400 Units IntraCATHeter PRN    And    heparin (porcine) 1000 UNIT/ML injection 1,100 Units  1,100 Units IntraCATHeter PRN    prismaSol BGK 4/2.5 dialysis solution   Dialysis Continuous    glucose chewable tablet 16 g  4

## 2024-04-14 LAB
ABO + RH BLD: NORMAL
ALBUMIN SERPL-MCNC: 2.5 G/DL (ref 3.5–5)
ALBUMIN/GLOB SERPL: 0.8 (ref 1.1–2.2)
ALP SERPL-CCNC: 90 U/L (ref 45–117)
ALT SERPL-CCNC: 619 U/L (ref 12–78)
ANION GAP SERPL CALC-SCNC: 7 MMOL/L (ref 5–15)
AST SERPL-CCNC: 232 U/L (ref 15–37)
BACTERIA SPEC CULT: ABNORMAL
BACTERIA SPEC CULT: NORMAL
BACTERIA SPEC CULT: NORMAL
BASOPHILS # BLD: 0 K/UL (ref 0–0.1)
BASOPHILS NFR BLD: 0 % (ref 0–1)
BILIRUB DIRECT SERPL-MCNC: 0.7 MG/DL (ref 0–0.2)
BILIRUB SERPL-MCNC: 1.5 MG/DL (ref 0.2–1)
BLD PROD TYP BPU: NORMAL
BLOOD BANK BLOOD PRODUCT EXPIRATION DATE: NORMAL
BLOOD BANK DISPENSE STATUS: NORMAL
BLOOD BANK ISBT PRODUCT BLOOD TYPE: 6200
BLOOD BANK PRODUCT CODE: NORMAL
BLOOD BANK UNIT TYPE AND RH: NORMAL
BLOOD GROUP ANTIBODIES SERPL: NORMAL
BPU ID: NORMAL
BUN SERPL-MCNC: 24 MG/DL (ref 6–20)
BUN/CREAT SERPL: 14 (ref 12–20)
CALCIUM SERPL-MCNC: 8.6 MG/DL (ref 8.5–10.1)
CC UR VC: ABNORMAL
CHLORIDE SERPL-SCNC: 107 MMOL/L (ref 97–108)
CO2 SERPL-SCNC: 23 MMOL/L (ref 21–32)
CREAT SERPL-MCNC: 1.77 MG/DL (ref 0.55–1.02)
CROSSMATCH RESULT: NORMAL
DIFFERENTIAL METHOD BLD: ABNORMAL
EOSINOPHIL # BLD: 0 K/UL (ref 0–0.4)
EOSINOPHIL NFR BLD: 0 % (ref 0–7)
ERYTHROCYTE [DISTWIDTH] IN BLOOD BY AUTOMATED COUNT: 15.7 % (ref 11.5–14.5)
GLOBULIN SER CALC-MCNC: 3.1 G/DL (ref 2–4)
GLUCOSE BLD STRIP.AUTO-MCNC: 113 MG/DL (ref 65–117)
GLUCOSE BLD STRIP.AUTO-MCNC: 125 MG/DL (ref 65–117)
GLUCOSE BLD STRIP.AUTO-MCNC: 133 MG/DL (ref 65–117)
GLUCOSE SERPL-MCNC: 125 MG/DL (ref 65–100)
GRAM STN SPEC: ABNORMAL
GRAM STN SPEC: ABNORMAL
HCT VFR BLD AUTO: 23.7 % (ref 35–47)
HCT VFR BLD AUTO: 24.1 % (ref 35–47)
HGB BLD-MCNC: 7.9 G/DL (ref 11.5–16)
HGB BLD-MCNC: 8 G/DL (ref 11.5–16)
IMM GRANULOCYTES # BLD AUTO: 0 K/UL
IMM GRANULOCYTES NFR BLD AUTO: 0 %
LYMPHOCYTES # BLD: 0.5 K/UL (ref 0.8–3.5)
LYMPHOCYTES NFR BLD: 5 % (ref 12–49)
MAGNESIUM SERPL-MCNC: 2.6 MG/DL (ref 1.6–2.4)
MCH RBC QN AUTO: 31.6 PG (ref 26–34)
MCHC RBC AUTO-ENTMCNC: 33.3 G/DL (ref 30–36.5)
MCV RBC AUTO: 94.8 FL (ref 80–99)
METAMYELOCYTES NFR BLD MANUAL: 1 %
MONOCYTES # BLD: 0.3 K/UL (ref 0–1)
MONOCYTES NFR BLD: 3 % (ref 5–13)
MYELOCYTES NFR BLD MANUAL: 3 %
NEUTS BAND NFR BLD MANUAL: 2 % (ref 0–6)
NEUTS SEG # BLD: 8.4 K/UL (ref 1.8–8)
NEUTS SEG NFR BLD: 86 % (ref 32–75)
NRBC # BLD: 0.11 K/UL (ref 0–0.01)
NRBC BLD-RTO: 1.2 PER 100 WBC
PHOSPHATE SERPL-MCNC: 2.8 MG/DL (ref 2.6–4.7)
PLATELET # BLD AUTO: 61 K/UL (ref 150–400)
PMV BLD AUTO: 11.6 FL (ref 8.9–12.9)
POTASSIUM SERPL-SCNC: 4.8 MMOL/L (ref 3.5–5.1)
PROT SERPL-MCNC: 5.6 G/DL (ref 6.4–8.2)
RBC # BLD AUTO: 2.5 M/UL (ref 3.8–5.2)
RBC MORPH BLD: ABNORMAL
RBC MORPH BLD: ABNORMAL
SERVICE CMNT-IMP: ABNORMAL
SERVICE CMNT-IMP: NORMAL
SERVICE CMNT-IMP: NORMAL
SODIUM SERPL-SCNC: 137 MMOL/L (ref 136–145)
SPECIMEN EXP DATE BLD: NORMAL
UNIT DIVISION: 0
UNIT ISSUE DATE/TIME: NORMAL
VANCOMYCIN SERPL-MCNC: 13.4 UG/ML
WBC # BLD AUTO: 9.6 K/UL (ref 3.6–11)

## 2024-04-14 PROCEDURE — 2500000003 HC RX 250 WO HCPCS: Performed by: NURSE PRACTITIONER

## 2024-04-14 PROCEDURE — 83735 ASSAY OF MAGNESIUM: CPT

## 2024-04-14 PROCEDURE — 94003 VENT MGMT INPAT SUBQ DAY: CPT

## 2024-04-14 PROCEDURE — 80076 HEPATIC FUNCTION PANEL: CPT

## 2024-04-14 PROCEDURE — 6360000002 HC RX W HCPCS: Performed by: STUDENT IN AN ORGANIZED HEALTH CARE EDUCATION/TRAINING PROGRAM

## 2024-04-14 PROCEDURE — 2000000000 HC ICU R&B

## 2024-04-14 PROCEDURE — 6360000002 HC RX W HCPCS: Performed by: NURSE PRACTITIONER

## 2024-04-14 PROCEDURE — 80048 BASIC METABOLIC PNL TOTAL CA: CPT

## 2024-04-14 PROCEDURE — 90945 DIALYSIS ONE EVALUATION: CPT

## 2024-04-14 PROCEDURE — 2580000003 HC RX 258: Performed by: NURSE PRACTITIONER

## 2024-04-14 PROCEDURE — 85018 HEMOGLOBIN: CPT

## 2024-04-14 PROCEDURE — 2580000003 HC RX 258: Performed by: INTERNAL MEDICINE

## 2024-04-14 PROCEDURE — 2580000003 HC RX 258: Performed by: STUDENT IN AN ORGANIZED HEALTH CARE EDUCATION/TRAINING PROGRAM

## 2024-04-14 PROCEDURE — 36415 COLL VENOUS BLD VENIPUNCTURE: CPT

## 2024-04-14 PROCEDURE — 84100 ASSAY OF PHOSPHORUS: CPT

## 2024-04-14 PROCEDURE — A4216 STERILE WATER/SALINE, 10 ML: HCPCS | Performed by: NURSE PRACTITIONER

## 2024-04-14 PROCEDURE — 2500000003 HC RX 250 WO HCPCS: Performed by: STUDENT IN AN ORGANIZED HEALTH CARE EDUCATION/TRAINING PROGRAM

## 2024-04-14 PROCEDURE — 85014 HEMATOCRIT: CPT

## 2024-04-14 PROCEDURE — 80202 ASSAY OF VANCOMYCIN: CPT

## 2024-04-14 PROCEDURE — 82962 GLUCOSE BLOOD TEST: CPT

## 2024-04-14 PROCEDURE — 85025 COMPLETE CBC W/AUTO DIFF WBC: CPT

## 2024-04-14 RX ADMIN — DEXMEDETOMIDINE HYDROCHLORIDE 0.8 MCG/KG/HR: 400 INJECTION, SOLUTION INTRAVENOUS at 10:03

## 2024-04-14 RX ADMIN — CALCIUM CHLORIDE, MAGNESIUM CHLORIDE, DEXTROSE MONOHYDRATE, LACTIC ACID, SODIUM CHLORIDE, SODIUM BICARBONATE AND POTASSIUM CHLORIDE: 3.68; 3.05; 22; 5.4; 6.46; 3.09; .314 INJECTION INTRAVENOUS at 00:24

## 2024-04-14 RX ADMIN — SODIUM CHLORIDE: 9 INJECTION, SOLUTION INTRAVENOUS at 12:14

## 2024-04-14 RX ADMIN — DEXMEDETOMIDINE HYDROCHLORIDE 0.8 MCG/KG/HR: 400 INJECTION, SOLUTION INTRAVENOUS at 03:43

## 2024-04-14 RX ADMIN — CALCIUM CHLORIDE, MAGNESIUM CHLORIDE, DEXTROSE MONOHYDRATE, LACTIC ACID, SODIUM CHLORIDE, SODIUM BICARBONATE AND POTASSIUM CHLORIDE: 3.68; 3.05; 22; 5.4; 6.46; 3.09; .314 INJECTION INTRAVENOUS at 15:57

## 2024-04-14 RX ADMIN — FENTANYL CITRATE 50 MCG: 0.05 INJECTION, SOLUTION INTRAMUSCULAR; INTRAVENOUS at 23:06

## 2024-04-14 RX ADMIN — CALCIUM CHLORIDE, MAGNESIUM CHLORIDE, DEXTROSE MONOHYDRATE, LACTIC ACID, SODIUM CHLORIDE, SODIUM BICARBONATE AND POTASSIUM CHLORIDE: 3.68; 3.05; 22; 5.4; 6.46; 3.09; .314 INJECTION INTRAVENOUS at 10:50

## 2024-04-14 RX ADMIN — VANCOMYCIN HYDROCHLORIDE 1000 MG: 1 INJECTION, POWDER, LYOPHILIZED, FOR SOLUTION INTRAVENOUS at 17:47

## 2024-04-14 RX ADMIN — CALCIUM CHLORIDE, MAGNESIUM CHLORIDE, DEXTROSE MONOHYDRATE, LACTIC ACID, SODIUM CHLORIDE, SODIUM BICARBONATE AND POTASSIUM CHLORIDE: 3.68; 3.05; 22; 5.4; 6.46; 3.09; .314 INJECTION INTRAVENOUS at 05:44

## 2024-04-14 RX ADMIN — DEXMEDETOMIDINE HYDROCHLORIDE 1 MCG/KG/HR: 400 INJECTION, SOLUTION INTRAVENOUS at 21:02

## 2024-04-14 RX ADMIN — FAMOTIDINE 20 MG: 10 INJECTION INTRAVENOUS at 07:56

## 2024-04-14 RX ADMIN — CHLORHEXIDINE GLUCONATE 15 ML: 1.2 RINSE ORAL at 19:58

## 2024-04-14 RX ADMIN — SODIUM CHLORIDE, PRESERVATIVE FREE 10 ML: 5 INJECTION INTRAVENOUS at 07:58

## 2024-04-14 RX ADMIN — CALCIUM CHLORIDE, MAGNESIUM CHLORIDE, DEXTROSE MONOHYDRATE, LACTIC ACID, SODIUM CHLORIDE, SODIUM BICARBONATE AND POTASSIUM CHLORIDE: 3.68; 3.05; 22; 5.4; 6.46; 3.09; .314 INJECTION INTRAVENOUS at 20:59

## 2024-04-14 RX ADMIN — CALCIUM CHLORIDE, MAGNESIUM CHLORIDE, DEXTROSE MONOHYDRATE, LACTIC ACID, SODIUM CHLORIDE, SODIUM BICARBONATE AND POTASSIUM CHLORIDE: 3.68; 3.05; 22; 5.4; 6.46; 3.09; .314 INJECTION INTRAVENOUS at 00:22

## 2024-04-14 RX ADMIN — CALCIUM CHLORIDE, MAGNESIUM CHLORIDE, DEXTROSE MONOHYDRATE, LACTIC ACID, SODIUM CHLORIDE, SODIUM BICARBONATE AND POTASSIUM CHLORIDE: 3.68; 3.05; 22; 5.4; 6.46; 3.09; .314 INJECTION INTRAVENOUS at 00:21

## 2024-04-14 RX ADMIN — PIPERACILLIN AND TAZOBACTAM 3375 MG: 3; .375 INJECTION, POWDER, LYOPHILIZED, FOR SOLUTION INTRAVENOUS; PARENTERAL at 16:15

## 2024-04-14 RX ADMIN — CHLORHEXIDINE GLUCONATE 15 ML: 1.2 RINSE ORAL at 07:59

## 2024-04-14 RX ADMIN — PIPERACILLIN AND TAZOBACTAM 3375 MG: 3; .375 INJECTION, POWDER, LYOPHILIZED, FOR SOLUTION INTRAVENOUS; PARENTERAL at 07:55

## 2024-04-14 RX ADMIN — CALCIUM CHLORIDE, MAGNESIUM CHLORIDE, DEXTROSE MONOHYDRATE, LACTIC ACID, SODIUM CHLORIDE, SODIUM BICARBONATE AND POTASSIUM CHLORIDE: 3.68; 3.05; 22; 5.4; 6.46; 3.09; .314 INJECTION INTRAVENOUS at 10:51

## 2024-04-14 RX ADMIN — CALCIUM CHLORIDE, MAGNESIUM CHLORIDE, DEXTROSE MONOHYDRATE, LACTIC ACID, SODIUM CHLORIDE, SODIUM BICARBONATE AND POTASSIUM CHLORIDE: 3.68; 3.05; 22; 5.4; 6.46; 3.09; .314 INJECTION INTRAVENOUS at 10:49

## 2024-04-14 RX ADMIN — CALCIUM CHLORIDE, MAGNESIUM CHLORIDE, DEXTROSE MONOHYDRATE, LACTIC ACID, SODIUM CHLORIDE, SODIUM BICARBONATE AND POTASSIUM CHLORIDE: 3.68; 3.05; 22; 5.4; 6.46; 3.09; .314 INJECTION INTRAVENOUS at 20:58

## 2024-04-14 RX ADMIN — DEXMEDETOMIDINE HYDROCHLORIDE 1 MCG/KG/HR: 400 INJECTION, SOLUTION INTRAVENOUS at 15:45

## 2024-04-14 RX ADMIN — CALCIUM CHLORIDE, MAGNESIUM CHLORIDE, DEXTROSE MONOHYDRATE, LACTIC ACID, SODIUM CHLORIDE, SODIUM BICARBONATE AND POTASSIUM CHLORIDE: 3.68; 3.05; 22; 5.4; 6.46; 3.09; .314 INJECTION INTRAVENOUS at 20:57

## 2024-04-14 RX ADMIN — SODIUM CHLORIDE, PRESERVATIVE FREE 10 ML: 5 INJECTION INTRAVENOUS at 19:57

## 2024-04-14 RX ADMIN — SODIUM CHLORIDE: 9 INJECTION, SOLUTION INTRAVENOUS at 10:04

## 2024-04-14 ASSESSMENT — PAIN SCALES - GENERAL
PAINLEVEL_OUTOF10: 0

## 2024-04-14 ASSESSMENT — PULMONARY FUNCTION TESTS
PIF_VALUE: 29
PIF_VALUE: 21
PIF_VALUE: 20
PIF_VALUE: 29
PIF_VALUE: 21

## 2024-04-14 NOTE — FLOWSHEET NOTE
CRRT / [phone]    Primary RN SBAR: Semaj, RN  Patient Education provided: pt unable to educate - intubated/sedated  Preferred Education method and Primary language: EDDI/English    Hepatitis B Surface Ag   Date/Time Value Ref Range Status   04/10/2023 06:49 AM <0.10 <1.00 Index Final     Hep B S Ab   Date/Time Value Ref Range Status   04/10/2023 06:49 AM <3.10 (L) >10.0 mIU/mL Final       04/14/24 0240   Treatment   $CRRT $Yes   Machine #   ([id number])   Cartridge Lot #   ([id number])   Therapy Type CVVH   Pressures   Access (mmHg) (!) -39 mmHg   Return/Venous (mmHg) (!) 32 mmHg   Filter (mmHg) 115 mmHg   TMP Pressure (mmHg) 25 mmHg   Pressure Drop (mmHg) 40 mmHg   Deaeration Chamber Check Yes   Flow Rates   Therapy Fluid (L/hr) 2 L/hr   Blood Flow (mL/min) 200 mL/min   Replacement Fluid Pre-Filter (mL/hr) 500 mL/hr   Replacement Filter Post-Filter (mL/hr) 500 mL/hr   Pre-Blood Pump (mL/hr) 1000 mL/hr   System Used   System Used Isabelle   Isabelle Calculation   (D) Physician Ordered Hourly Removal (mL) 100 ml   CRRT Activities   Intervention Initiated   Hemodialysis Central Access Right Neck   Placement Date/Time: 04/12/24 1210   Present on Admission/Arrival: No  Inserted by: Carlos Eduardo CEJA  Insertion Practices: Chlorohexadine skin antisepsis;Hand hygiene;Maximal barrier precautions;Optimal catheter site selection;Sterile ultrasound technique...   Continued need for line? Yes   Site Assessment Clean, dry & intact   Venous Lumen Status Infusing   Arterial Lumen Status Infusing   Line Care Connections checked and tightened;Ports disinfected   Dressing Type Bacteriocidal;Transparent   Date of Last Dressing Change 04/12/24   Dressing Status Clean, dry & intact   Dressing Change Due 04/16/24     Anh WHALEY at bedside to change filter r/t increasing TMPs/Pds and overall filter pressures. Patient, code status, labs, and orders verified. Consents on chart. Time out completed. Anh WHALEY able to return all pt blood from circuit

## 2024-04-14 NOTE — PROGRESS NOTES
1030: SBP 150s-160s since start of shift. Discussed BP parameters with Juan Francisco CEJA. Advised current BP range OK d/t patient being off vasopressors and on CRRT.     1307: Pt pulling against restraints and restless in bed. Fentanyl gtt restarted at 25mcg/hr.

## 2024-04-15 ENCOUNTER — APPOINTMENT (OUTPATIENT)
Facility: HOSPITAL | Age: 79
DRG: 356 | End: 2024-04-15
Payer: MEDICARE

## 2024-04-15 LAB
ALBUMIN SERPL-MCNC: 2.5 G/DL (ref 3.5–5)
ALBUMIN/GLOB SERPL: 0.7 (ref 1.1–2.2)
ALP SERPL-CCNC: 91 U/L (ref 45–117)
ALT SERPL-CCNC: 486 U/L (ref 12–78)
ANION GAP SERPL CALC-SCNC: 3 MMOL/L (ref 5–15)
AST SERPL-CCNC: 139 U/L (ref 15–37)
BASOPHILS # BLD: 0 K/UL (ref 0–0.1)
BASOPHILS NFR BLD: 0 % (ref 0–1)
BILIRUB SERPL-MCNC: 1.4 MG/DL (ref 0.2–1)
BUN SERPL-MCNC: 23 MG/DL (ref 6–20)
BUN/CREAT SERPL: 15 (ref 12–20)
CALCIUM SERPL-MCNC: 8.5 MG/DL (ref 8.5–10.1)
CHLORIDE SERPL-SCNC: 107 MMOL/L (ref 97–108)
CO2 SERPL-SCNC: 25 MMOL/L (ref 21–32)
CREAT SERPL-MCNC: 1.5 MG/DL (ref 0.55–1.02)
DIFFERENTIAL METHOD BLD: ABNORMAL
EKG ATRIAL RATE: 102 BPM
EKG DIAGNOSIS: NORMAL
EKG Q-T INTERVAL: 432 MS
EKG QRS DURATION: 88 MS
EKG QTC CALCULATION (BAZETT): 445 MS
EKG R AXIS: 19 DEGREES
EKG T AXIS: 3 DEGREES
EKG VENTRICULAR RATE: 64 BPM
EOSINOPHIL # BLD: 0.1 K/UL (ref 0–0.4)
EOSINOPHIL NFR BLD: 1 % (ref 0–7)
ERYTHROCYTE [DISTWIDTH] IN BLOOD BY AUTOMATED COUNT: 15.7 % (ref 11.5–14.5)
GLOBULIN SER CALC-MCNC: 3.4 G/DL (ref 2–4)
GLUCOSE BLD STRIP.AUTO-MCNC: 108 MG/DL (ref 65–117)
GLUCOSE BLD STRIP.AUTO-MCNC: 130 MG/DL (ref 65–117)
GLUCOSE BLD STRIP.AUTO-MCNC: 142 MG/DL (ref 65–117)
GLUCOSE BLD STRIP.AUTO-MCNC: 143 MG/DL (ref 65–117)
GLUCOSE SERPL-MCNC: 126 MG/DL (ref 65–100)
HCT VFR BLD AUTO: 23.6 % (ref 35–47)
HCT VFR BLD AUTO: 24.8 % (ref 35–47)
HGB BLD-MCNC: 7.5 G/DL (ref 11.5–16)
HGB BLD-MCNC: 8 G/DL (ref 11.5–16)
IMM GRANULOCYTES # BLD AUTO: 0 K/UL
IMM GRANULOCYTES NFR BLD AUTO: 0 %
LACTATE SERPL-SCNC: 0.9 MMOL/L (ref 0.4–2)
LYMPHOCYTES # BLD: 0.5 K/UL (ref 0.8–3.5)
LYMPHOCYTES NFR BLD: 5 % (ref 12–49)
MAGNESIUM SERPL-MCNC: 2.6 MG/DL (ref 1.6–2.4)
MCH RBC QN AUTO: 31.1 PG (ref 26–34)
MCHC RBC AUTO-ENTMCNC: 32.3 G/DL (ref 30–36.5)
MCV RBC AUTO: 96.5 FL (ref 80–99)
MONOCYTES # BLD: 0.5 K/UL (ref 0–1)
MONOCYTES NFR BLD: 6 % (ref 5–13)
MYELOCYTES NFR BLD MANUAL: 2 %
NEUTS BAND NFR BLD MANUAL: 2 % (ref 0–6)
NEUTS SEG # BLD: 7.7 K/UL (ref 1.8–8)
NEUTS SEG NFR BLD: 84 % (ref 32–75)
NRBC # BLD: 0.05 K/UL (ref 0–0.01)
NRBC BLD-RTO: 0.6 PER 100 WBC
PHOSPHATE SERPL-MCNC: 2.3 MG/DL (ref 2.6–4.7)
PLATELET # BLD AUTO: 57 K/UL (ref 150–400)
PMV BLD AUTO: 11.2 FL (ref 8.9–12.9)
POTASSIUM SERPL-SCNC: 4.7 MMOL/L (ref 3.5–5.1)
PROCALCITONIN SERPL-MCNC: 0.75 NG/ML
PROT SERPL-MCNC: 5.9 G/DL (ref 6.4–8.2)
RBC # BLD AUTO: 2.57 M/UL (ref 3.8–5.2)
RBC MORPH BLD: ABNORMAL
RBC MORPH BLD: ABNORMAL
SERVICE CMNT-IMP: ABNORMAL
SERVICE CMNT-IMP: NORMAL
SODIUM SERPL-SCNC: 135 MMOL/L (ref 136–145)
WBC # BLD AUTO: 9 K/UL (ref 3.6–11)

## 2024-04-15 PROCEDURE — 85014 HEMATOCRIT: CPT

## 2024-04-15 PROCEDURE — 6360000002 HC RX W HCPCS: Performed by: INTERNAL MEDICINE

## 2024-04-15 PROCEDURE — 84145 PROCALCITONIN (PCT): CPT

## 2024-04-15 PROCEDURE — 36415 COLL VENOUS BLD VENIPUNCTURE: CPT

## 2024-04-15 PROCEDURE — 85018 HEMOGLOBIN: CPT

## 2024-04-15 PROCEDURE — 90935 HEMODIALYSIS ONE EVALUATION: CPT

## 2024-04-15 PROCEDURE — 83605 ASSAY OF LACTIC ACID: CPT

## 2024-04-15 PROCEDURE — 6370000000 HC RX 637 (ALT 250 FOR IP): Performed by: INTERNAL MEDICINE

## 2024-04-15 PROCEDURE — 6360000002 HC RX W HCPCS: Performed by: STUDENT IN AN ORGANIZED HEALTH CARE EDUCATION/TRAINING PROGRAM

## 2024-04-15 PROCEDURE — 2500000003 HC RX 250 WO HCPCS: Performed by: STUDENT IN AN ORGANIZED HEALTH CARE EDUCATION/TRAINING PROGRAM

## 2024-04-15 PROCEDURE — P9047 ALBUMIN (HUMAN), 25%, 50ML: HCPCS | Performed by: INTERNAL MEDICINE

## 2024-04-15 PROCEDURE — 84100 ASSAY OF PHOSPHORUS: CPT

## 2024-04-15 PROCEDURE — 6360000002 HC RX W HCPCS: Performed by: NURSE PRACTITIONER

## 2024-04-15 PROCEDURE — 90945 DIALYSIS ONE EVALUATION: CPT

## 2024-04-15 PROCEDURE — 71045 X-RAY EXAM CHEST 1 VIEW: CPT

## 2024-04-15 PROCEDURE — A4216 STERILE WATER/SALINE, 10 ML: HCPCS | Performed by: INTERNAL MEDICINE

## 2024-04-15 PROCEDURE — 2580000003 HC RX 258: Performed by: STUDENT IN AN ORGANIZED HEALTH CARE EDUCATION/TRAINING PROGRAM

## 2024-04-15 PROCEDURE — 80053 COMPREHEN METABOLIC PANEL: CPT

## 2024-04-15 PROCEDURE — 85025 COMPLETE CBC W/AUTO DIFF WBC: CPT

## 2024-04-15 PROCEDURE — 93010 ELECTROCARDIOGRAM REPORT: CPT | Performed by: SPECIALIST

## 2024-04-15 PROCEDURE — 2580000003 HC RX 258: Performed by: NURSE PRACTITIONER

## 2024-04-15 PROCEDURE — 82962 GLUCOSE BLOOD TEST: CPT

## 2024-04-15 PROCEDURE — 2000000000 HC ICU R&B

## 2024-04-15 PROCEDURE — 2500000003 HC RX 250 WO HCPCS: Performed by: INTERNAL MEDICINE

## 2024-04-15 PROCEDURE — 83735 ASSAY OF MAGNESIUM: CPT

## 2024-04-15 PROCEDURE — 94003 VENT MGMT INPAT SUBQ DAY: CPT

## 2024-04-15 PROCEDURE — A4216 STERILE WATER/SALINE, 10 ML: HCPCS | Performed by: NURSE PRACTITIONER

## 2024-04-15 PROCEDURE — 2580000003 HC RX 258: Performed by: INTERNAL MEDICINE

## 2024-04-15 RX ORDER — OXYCODONE HYDROCHLORIDE 5 MG/1
5 TABLET ORAL EVERY 4 HOURS PRN
Status: DISCONTINUED | OUTPATIENT
Start: 2024-04-15 | End: 2024-04-23 | Stop reason: HOSPADM

## 2024-04-15 RX ORDER — HEPARIN SODIUM 5000 [USP'U]/ML
5000 INJECTION, SOLUTION INTRAVENOUS; SUBCUTANEOUS EVERY 8 HOURS SCHEDULED
Status: DISCONTINUED | OUTPATIENT
Start: 2024-04-15 | End: 2024-04-19

## 2024-04-15 RX ORDER — FAMOTIDINE 40 MG/5ML
20 POWDER, FOR SUSPENSION ORAL DAILY
Status: DISCONTINUED | OUTPATIENT
Start: 2024-04-16 | End: 2024-04-16

## 2024-04-15 RX ORDER — AMLODIPINE BESYLATE 5 MG/1
10 TABLET ORAL DAILY
Status: DISCONTINUED | OUTPATIENT
Start: 2024-04-15 | End: 2024-04-19

## 2024-04-15 RX ORDER — PROPOFOL 10 MG/ML
5-50 INJECTION, EMULSION INTRAVENOUS CONTINUOUS
Status: DISCONTINUED | OUTPATIENT
Start: 2024-04-15 | End: 2024-04-16

## 2024-04-15 RX ORDER — OXYCODONE HYDROCHLORIDE 5 MG/1
10 TABLET ORAL EVERY 4 HOURS PRN
Status: DISCONTINUED | OUTPATIENT
Start: 2024-04-15 | End: 2024-04-23 | Stop reason: HOSPADM

## 2024-04-15 RX ORDER — ALBUMIN (HUMAN) 12.5 G/50ML
25 SOLUTION INTRAVENOUS PRN
Status: DISCONTINUED | OUTPATIENT
Start: 2024-04-15 | End: 2024-04-23 | Stop reason: HOSPADM

## 2024-04-15 RX ORDER — HYDRALAZINE HYDROCHLORIDE 20 MG/ML
10 INJECTION INTRAMUSCULAR; INTRAVENOUS EVERY 6 HOURS PRN
Status: DISCONTINUED | OUTPATIENT
Start: 2024-04-15 | End: 2024-04-23 | Stop reason: HOSPADM

## 2024-04-15 RX ORDER — GINSENG 100 MG
CAPSULE ORAL
Status: COMPLETED | OUTPATIENT
Start: 2024-04-15 | End: 2024-04-15

## 2024-04-15 RX ADMIN — SODIUM CHLORIDE, PRESERVATIVE FREE 10 ML: 5 INJECTION INTRAVENOUS at 19:54

## 2024-04-15 RX ADMIN — WATER 2000 MG: 1 INJECTION INTRAMUSCULAR; INTRAVENOUS; SUBCUTANEOUS at 09:12

## 2024-04-15 RX ADMIN — HEPARIN SODIUM 5000 UNITS: 5000 INJECTION INTRAVENOUS; SUBCUTANEOUS at 14:45

## 2024-04-15 RX ADMIN — FENTANYL CITRATE 50 MCG: 0.05 INJECTION, SOLUTION INTRAMUSCULAR; INTRAVENOUS at 02:13

## 2024-04-15 RX ADMIN — FENTANYL CITRATE 50 MCG: 0.05 INJECTION, SOLUTION INTRAMUSCULAR; INTRAVENOUS at 05:35

## 2024-04-15 RX ADMIN — DEXMEDETOMIDINE HYDROCHLORIDE 1 MCG/KG/HR: 400 INJECTION, SOLUTION INTRAVENOUS at 02:28

## 2024-04-15 RX ADMIN — HEPARIN SODIUM 1400 UNITS: 1000 INJECTION INTRAVENOUS; SUBCUTANEOUS at 10:12

## 2024-04-15 RX ADMIN — DEXMEDETOMIDINE HYDROCHLORIDE 1.5 MCG/KG/HR: 400 INJECTION, SOLUTION INTRAVENOUS at 19:28

## 2024-04-15 RX ADMIN — FENTANYL CITRATE 50 MCG: 0.05 INJECTION, SOLUTION INTRAMUSCULAR; INTRAVENOUS at 01:07

## 2024-04-15 RX ADMIN — DEXMEDETOMIDINE HYDROCHLORIDE 1.5 MCG/KG/HR: 400 INJECTION, SOLUTION INTRAVENOUS at 23:03

## 2024-04-15 RX ADMIN — CALCIUM CHLORIDE, MAGNESIUM CHLORIDE, DEXTROSE MONOHYDRATE, LACTIC ACID, SODIUM CHLORIDE, SODIUM BICARBONATE AND POTASSIUM CHLORIDE: 3.68; 3.05; 22; 5.4; 6.46; 3.09; .314 INJECTION INTRAVENOUS at 01:58

## 2024-04-15 RX ADMIN — SODIUM CHLORIDE, PRESERVATIVE FREE 10 ML: 5 INJECTION INTRAVENOUS at 09:14

## 2024-04-15 RX ADMIN — DEXMEDETOMIDINE HYDROCHLORIDE 1.5 MCG/KG/HR: 400 INJECTION, SOLUTION INTRAVENOUS at 15:56

## 2024-04-15 RX ADMIN — DEXMEDETOMIDINE HYDROCHLORIDE 1.5 MCG/KG/HR: 400 INJECTION, SOLUTION INTRAVENOUS at 05:55

## 2024-04-15 RX ADMIN — PIPERACILLIN AND TAZOBACTAM 3375 MG: 3; .375 INJECTION, POWDER, LYOPHILIZED, FOR SOLUTION INTRAVENOUS; PARENTERAL at 00:00

## 2024-04-15 RX ADMIN — HYDRALAZINE HYDROCHLORIDE 10 MG: 20 INJECTION, SOLUTION INTRAMUSCULAR; INTRAVENOUS at 06:13

## 2024-04-15 RX ADMIN — HEPARIN SODIUM 5000 UNITS: 5000 INJECTION INTRAVENOUS; SUBCUTANEOUS at 08:50

## 2024-04-15 RX ADMIN — HEPARIN SODIUM 1100 UNITS: 1000 INJECTION INTRAVENOUS; SUBCUTANEOUS at 10:12

## 2024-04-15 RX ADMIN — HYDRALAZINE HYDROCHLORIDE 10 MG: 20 INJECTION, SOLUTION INTRAMUSCULAR; INTRAVENOUS at 20:07

## 2024-04-15 RX ADMIN — CHLORHEXIDINE GLUCONATE 15 ML: 1.2 RINSE ORAL at 09:14

## 2024-04-15 RX ADMIN — BACITRACIN 1 EACH: 500 OINTMENT TOPICAL at 10:50

## 2024-04-15 RX ADMIN — HEPARIN SODIUM 5000 UNITS: 5000 INJECTION INTRAVENOUS; SUBCUTANEOUS at 21:53

## 2024-04-15 RX ADMIN — PROPOFOL 10 MCG/KG/MIN: 10 INJECTION, EMULSION INTRAVENOUS at 22:16

## 2024-04-15 RX ADMIN — DEXMEDETOMIDINE HYDROCHLORIDE 1.5 MCG/KG/HR: 400 INJECTION, SOLUTION INTRAVENOUS at 09:25

## 2024-04-15 RX ADMIN — CHLORHEXIDINE GLUCONATE 15 ML: 1.2 RINSE ORAL at 19:54

## 2024-04-15 RX ADMIN — WATER 1000 MG: 1 INJECTION INTRAMUSCULAR; INTRAVENOUS; SUBCUTANEOUS at 20:08

## 2024-04-15 RX ADMIN — HYDRALAZINE HYDROCHLORIDE 10 MG: 20 INJECTION, SOLUTION INTRAMUSCULAR; INTRAVENOUS at 11:09

## 2024-04-15 RX ADMIN — AMLODIPINE BESYLATE 10 MG: 5 TABLET ORAL at 12:41

## 2024-04-15 RX ADMIN — HEPARIN SODIUM 1100 UNITS: 1000 INJECTION INTRAVENOUS; SUBCUTANEOUS at 16:01

## 2024-04-15 RX ADMIN — CALCIUM CHLORIDE, MAGNESIUM CHLORIDE, DEXTROSE MONOHYDRATE, LACTIC ACID, SODIUM CHLORIDE, SODIUM BICARBONATE AND POTASSIUM CHLORIDE: 3.68; 3.05; 22; 5.4; 6.46; 3.09; .314 INJECTION INTRAVENOUS at 07:06

## 2024-04-15 RX ADMIN — HEPARIN SODIUM 1400 UNITS: 1000 INJECTION INTRAVENOUS; SUBCUTANEOUS at 16:01

## 2024-04-15 RX ADMIN — CALCIUM CHLORIDE, MAGNESIUM CHLORIDE, DEXTROSE MONOHYDRATE, LACTIC ACID, SODIUM CHLORIDE, SODIUM BICARBONATE AND POTASSIUM CHLORIDE: 3.68; 3.05; 22; 5.4; 6.46; 3.09; .314 INJECTION INTRAVENOUS at 07:02

## 2024-04-15 RX ADMIN — CALCIUM CHLORIDE, MAGNESIUM CHLORIDE, DEXTROSE MONOHYDRATE, LACTIC ACID, SODIUM CHLORIDE, SODIUM BICARBONATE AND POTASSIUM CHLORIDE: 3.68; 3.05; 22; 5.4; 6.46; 3.09; .314 INJECTION INTRAVENOUS at 07:07

## 2024-04-15 RX ADMIN — ALBUMIN (HUMAN) 25 G: 0.25 INJECTION, SOLUTION INTRAVENOUS at 15:54

## 2024-04-15 RX ADMIN — FAMOTIDINE 20 MG: 10 INJECTION, SOLUTION INTRAVENOUS at 09:14

## 2024-04-15 RX ADMIN — DEXMEDETOMIDINE HYDROCHLORIDE 1.5 MCG/KG/HR: 400 INJECTION, SOLUTION INTRAVENOUS at 12:40

## 2024-04-15 ASSESSMENT — PAIN SCALES - GENERAL
PAINLEVEL_OUTOF10: 0

## 2024-04-15 ASSESSMENT — PULMONARY FUNCTION TESTS
PIF_VALUE: 38
PIF_VALUE: 28

## 2024-04-15 NOTE — PROGRESS NOTES
Comprehensive Nutrition Assessment    Type and Reason for Visit: Consult, Reassess    Nutrition Recommendations/Plan:     -Start EN support: Nepro @ 35 ml/hr with 1 packet Prosource daily and 30 ml water flush q 4 hr         Malnutrition Assessment:  Malnutrition Status:  Moderate malnutrition (04/12/24 1506)    Context:  Acute Illness     Findings of the 6 clinical characteristics of malnutrition:  Energy Intake:  50% or less of estimated energy requirements for 5 or more days  Weight Loss:  Unable to assess     Body Fat Loss:  No significant body fat loss     Muscle Mass Loss:  No significant muscle mass loss    Fluid Accumulation:  Mild Extremities, Generalized    Strength:  Not Performed       Nutrition Assessment:    Pt admitted from OSH d/t Hemorrhagic shock. PMHx: CDK 4, DM 2, HTN, CHF. Initially \"admitted to OSH ICU for acute hypoxic respiratory failure and flash pulmonary edema 2/2 left heart failure.\" Required intubation 4/9 d/t hypovolemic shock 2/2 retroperitoneal hemorrhage. Remains intubated and on pressor. ARIC-anuric/vol overload-started on CRRT 4/10; ?transition to IHD soon.    4/15: Ileus improved-plan to start EN today. Transitioning off CRRT to IHD today. Remains intubated-undergoing daily SBT's. Off propofol. Phosphorous and sodium slightly BNL today.     See above recommended feeding; nutrient needs re-assessed from 4/12. Suggest renal formula-at goal it will provide 770 ml, 1465 calories, 82 gm protein and 800 ml free water (tube feeding/flush) per day to meet estimated needs.     4/12: Discussed during IDR. Abdomen firm, distended, last BM 4/5 per nutrition note from OSH. Plan to check KUB today-?distention d/t RP bleed vs other. No output from NGT overnight.    If KUB negative-consider starting bowel regimen; hold of on EN support until pt has a BM. Propofol @ current rate provides 310 lipid calories per day. See above EN goals. First feeding will provide 880 ml, 960 calories, 96 gm  Did not need protein and 1085 ml free water per day. Goal off propofol to provide 1210 ml, 1210 calories, 104 gm protein, 43 mEq potassium and 1300 ml free water (tube feeding/flush) per day to meet estimated needs.    Labs reviewed.     Nutritionally Significant Medications:  Pepcid, Humalog      Estimated Daily Nutrient Needs:     Weight Used for Energy Requirements: Admission (75 kg)  Energy (kcal/day): 1530 ([facility] 2010)  Weight Used for Protein Requirements: Ideal  Protein (g/day):  (1.5-2.0g/kg IBW)  Method Used for Fluid Requirements: Standard Renal  Fluid (ml/day):      Nutrition Related Findings:   Edema: Generalized, Right upper extremity, Left upper extremity   Edema Generalized: Non-pitting  RUE Edema: Trace  LUE Edema: Trace  RLE Edema: +1  LLE Edema: +1    Last BM: 04/14/24    Wounds:   Wound Type: None      Current Nutrition Therapies:  Diet: NPO  Nutrition Support: None      Anthropometric Measures:  Height: 157.5 cm (5' 2.01\")  Ideal Body Weight (IBW): 110 lbs (50 kg)    Admission Body Weight: 71.2 kg (156 lb 15.5 oz) (OSH)  Current Body Weight: 77.8 kg (171 lb 8.3 oz), 155.9 % IBW. Weight Source: Bed Scale  Current BMI (kg/m2): 31.4                          BMI Categories: Obese Class 1 (BMI 30.0-34.9)    Wt Readings from Last 10 Encounters:   04/15/24 75.1 kg (165 lb 9.1 oz)   04/09/24 78 kg (172 lb)   04/17/23 75.8 kg (167 lb)           Nutrition Diagnosis:   Inadequate protein-energy intake related to impaired respiratory function, altered GI function as evidenced by NPO or clear liquid status due to medical condition, intubation.    Nutrition Interventions:   Food and/or Nutrient Delivery: Continue NPO, Start Tube Feeding  Nutrition Education/Counseling: No recommendation at this time  Coordination of Nutrition Care: Continue to monitor while inpatient, Interdisciplinary Rounds       Goals:  Previous Goal Met: Progressing toward Goal(s)  Goals: Tolerate nutrition support at goal rate, within 2

## 2024-04-15 NOTE — PROGRESS NOTES
Pharmacist Note - Vancomycin Dosing  Therapy day 3  Indication: Sepsis of unknown etiology  Current regimen: Vancomycin 1 g IV q 24    Recent Labs     04/12/24  0318 04/12/24  0952 04/13/24  0613 04/13/24  0948 04/14/24  0309   WBC 13.8*  --  10.2  --  9.6   CREATININE 2.98*   < > 2.43* 2.24* 1.77*   BUN 43*   < > 32* 30* 24*    < > = values in this interval not displayed.       A random vancomycin level of 13.4 mcg/mL was obtained and from this level.  Goal target range Trough 10-15 mcg/mL      Plan: Continue current regimen. Pharmacy will continue to monitor this patient daily for changes in clinical status and renal function.      Jose Van, PharmD

## 2024-04-15 NOTE — FLOWSHEET NOTE
04/15/24 1815   Vital Signs   BP (!) 152/53   Temp 99.3 °F (37.4 °C)   Pulse 70   Respirations 22   SpO2 97 %   Pain Assessment   Pain Assessment Critical Care Pain Observation Tool (CPOT)   Pain Level 0   Post-Hemodialysis Assessment   Post-Treatment Procedures Blood returned;Catheter capped, clamped and heparinized x 2 ports   Machine Disinfection Process Acid/Vinegar Clean;Heat Disinfect;Exterior Machine Disinfection   Rinseback Volume (ml) 300 ml   Blood Volume Processed (Liters) 69.1 L   Dialyzer Clearance Clear   Duration of Treatment (minutes) 180 minutes   Hemodialysis Intake (ml) 500 ml   Hemodialysis Output (ml) 1500 ml   NET Removed (ml) 1000   Tolerated Treatment Fair   Interventions Taken Medication;Ultrafiltration stopped;Ultrafiltration goal decreased   Patient Response to Treatment SBP remained above 100 after interventions   Bilateral Breath Sounds Clear   Edema Generalized   Physician Notified Yes  (Informed Dr Ewing of limited uf r/t hypotension; SBP in the 90's)   Time Off 1815   Patient Disposition Remain in ICU/ED   Observations & Evaluations   Level of Consciousness 1   Heart Rhythm Regular   Respiratory Quality/Effort Unlabored   O2 Device Ventilator   Skin Condition/Temp Warm   Abdomen Inspection Distended     Primary RN SBAR: Norman Amato, RN

## 2024-04-15 NOTE — PROGRESS NOTES
CRITICAL CARE NOTE      Name: Rozina Doss   : 1945   MRN: [id number]   Date: 4/15/2024      REASON FOR ICU ADMISSION:  Hypovolemic shock 2/2 retroperitoneal hemorrhage    PRINCIPAL ICU DIAGNOSIS   Hypovolemic shock 2/2 retroperitoneal hemorrhage  ARIC on RRT    BRIEF PATIENT SUMMARY     Rozina Doss is a 77 y/o female w/ PMHx CKD stage 3, T2DM, HTN, CHF who presented to [facility] ED on  initially w/ c/o cough, difficulty walking, generalized body aches, malaise. She developed flash pulmonary edema after 500ml IVF bolus and required BIPAP. She was subsequently admitted to OSH ICU for acute hypoxic respiratory failure and flash pulmonary edema 2/2 left heart failure. Today  she became acutely unresponsive and significantly hypotensive, hypothermic, bradycardic w/ severe decrease in hgb w/ no identified source of active bleeding. Hgb dropped from 7.4 -> 3.9 in 72hrs. She was intubated at OSH for airway protection, started on NE, and transfused w/ 3 units PRBC's and 2 units FFP. Arrives to [facility] ICU from IR on MV s/p embolectomy w/ coiling. Started on CRRT for ARIC and continuing blood and IVF resuscitation.     COMPREHENSIVE ASSESSMENT & PLAN:SYSTEM BASED     24 HOUR EVENTS: No acute o/n events, HD catheter removed due to positional nature causing issues w/ CRRT. Will discuss possibility of transition to iHD and timing of HD line replacement w/ nephrology. Remains on low mathis levophed.     Addendum 1700: Pt w/ increased secretions from ETT and vasopressor requirement. PanCx and broad abx.    4/15- -overnight no acute events.  SBT/SAT this a.m, unable to follow commands.  Started on tube feeds.    NEUROLOGICAL:     Acute metabolic encephalopathy, hypoactive delirium  -Continue Precedex, begin pain regimen (oxycodone as needed)  -Avoid other sedatives including benzos,    PULMONOLOGY:      Acute respiratory failure, suspect related to pulmonary edema -remains intubated  Suspect pulm edema  3:16 p.m. on  4/9/2024.    Most Recent MRI  MRI PELVIS WO CONTRAST 04/03/2024    Narrative  EXAM:  MRI PELVIS WO CONTRAST    INDICATION: Hip discomfort    COMPARISON: CT abdomen pelvis 4/12/2023, pelvis and bilateral hip radiographs  4/8/2023    TECHNIQUE: Axial, coronal, and sagittal MRI of the pelvis in the T1, T2, and  inversion-recovery pulse sequences with and without fat saturation .    CONTRAST: None.    FINDINGS:  Study significantly degraded by motion.  Bone marrow: No fracture, dislocation, or marrow replacing process. No evidence  of stress reaction or periostitis.    Joint fluid: Small left hip joint effusion. Small bilateral iliopsoas tendon  sheath/bursal effusion/bursitis.    Tendons: Left gluteus minimus tendon partial tear. Remainder grossly intact.    Muscles: Within normal limits.    Neurovascular bundles: Within normal limits.    Articulations: Moderate right and mild left sacroiliac joint osteoarthritis.  Mild pubic symphysis arthropathy. Severe right and moderate left hip joint  osteoarthritis..    Soft tissues: Patchy soft tissue edema overlying the bilateral hips, right worse  than left.    Other: Davis catheter. Hysterectomy. Trace pelvic free fluid.    Impression  1.  Study significantly degraded by motion.  2.  No acute fracture.  3.  Degenerative joint disease as outlined above.  4.  LEFT gluteus minimus tendon partial tear.  5.  Incidental findings as above.  6.  MRI lumbar spine report dictated separately.    Most Recent Echo  04/01/24    ECHO (TTE) LIMITED (PRN CONTRAST/BUBBLE/STRAIN/3D) 04/09/2024  6:33 PM (Final)    Interpretation Summary    Right Ventricle: Not well visualized. Visually normal systolic function.    Aortic Valve: Trileaflet valve. Moderate sclerosis of the aortic valve cusp.    Mitral Valve: Moderate annular calcification of the mitral valve. Trace regurgitation.    Left Ventricle: Hyperdynamic left ventricular systolic function (on vasopressors). Left ventricle size

## 2024-04-15 NOTE — PROGRESS NOTES
[address], [facility] [place] [address]  Phone: [phone]   [phone]    [email or web address]     Nephrology Progress Note    Patient Name : Rozina Doss      : 1945     MRN : [id number]  Date of Admission : 2024  Date of Servive : 04/15/24    CC:  Follow up for ARIC on CKD       Assessment and Plan   ARIC on CKD IV:  - 2/2 ATN from hemorrhagic shock  - can stop CRRT today  - IHD MWF starting today  - daily labs and I/Os    CKD IV:  - baseline Cr around 2.5  - f/b [facility]     Hyperkalemia/acidosis:  - resolved     Hemorrhagic shock:  - transfused if hgb < 7  - off pressors and bp high now      Shock liver:  - trend LFTs    RP bleed s/p coil ebmolization      DM2  Obesity  HTN     Interval History:  Seen and examined on CRRT.  Tolerating factor of 100/hr.  Off pressors.  No UOP.  Sedated on the vent. Failed SBT this AM    Review of Systems: Pertinent items are noted in HPI.    Current Medications:   Current Facility-Administered Medications   Medication Dose Route Frequency    hydrALAZINE (APRESOLINE) injection 10 mg  10 mg IntraVENous Q6H PRN    ceFAZolin (ANCEF) 2,000 mg in sterile water 20 mL IV syringe  2,000 mg IntraVENous Q8H    famotidine (PEPCID) 20 mg in sodium chloride (PF) 0.9 % 10 mL injection  20 mg IntraVENous BID    heparin (porcine) injection 5,000 Units  5,000 Units SubCUTAneous 3 times per day    dexmedeTOMIDine (PRECEDEX) 400 mcg in sodium chloride 0.9 % 100 mL infusion  0.1-1.5 mcg/kg/hr IntraVENous Continuous    heparin (porcine) 1000 UNIT/ML injection 1,400 Units  1,400 Units IntraCATHeter PRN    And    heparin (porcine) 1000 UNIT/ML injection 1,100 Units  1,100 Units IntraCATHeter PRN    prismaSol BGK 4/2.5 dialysis solution   Dialysis Continuous    glucose chewable tablet 16 g  4 tablet Oral PRN    dextrose bolus 10% 125 mL  125 mL IntraVENous PRN    Or    dextrose bolus 10% 250 mL  250  involvement    Lab Data Personally Reviewed: I have reviewed all the pertinent labs, microbiology data and radiology studies during assessment.    Labs:  Recent Labs     04/13/24  0948 04/14/24  0309 04/15/24  0339    137 135*   K 4.8 4.8 4.7    107 107   CO2 24 23 25   GLUCOSE 116* 125* 126*   BUN 30* 24* 23*   CREATININE 2.24* 1.77* 1.50*   CALCIUM 8.7 8.6 8.5         Recent Labs     04/13/24  0613 04/13/24  0948 04/14/24 0309 04/14/24  1551 04/15/24  0339   WBC 10.2  --  9.6  --  9.0   RBC 2.53*  --  2.50*  --  2.57*   HGB 7.7*   < > 7.9* 8.0* 8.0*   HCT 23.8*   < > 23.7* 24.1* 24.8*   MCV 94.1  --  94.8  --  96.5   MCH 30.4  --  31.6  --  31.1   MCHC 32.4  --  33.3  --  32.3   RDW 15.5*  --  15.7*  --  15.7*   PLT 63*  --  61*  --  57*   MPV 11.8  --  11.6  --  11.2    < > = values in this interval not displayed.       Recent Labs     04/13/24  0613 04/14/24  0309 04/15/24  0339   GLOB 2.7 3.1 3.4       No results for input(s): \"INR\", \"APTT\" in the last 72 hours.    Invalid input(s): \"PTP\"     No results for input(s): \"CPK\", \"CKMB\", \"TROPONINI\" in the last 72 hours.    Invalid input(s): \"B-NP\"  Invalid input(s): \"PHI\", \"PCO2I\", \"PO2I\", \"FIO2I\"     Ventilator:       Microbiology:  No results found for: \"SDES\"  No components found for: \"CULT\"      I have reviewed the flowsheets.  Chart and Pertinent Notes have been reviewed.   No change in PMH ,family and social history from Consult note.      Maicol Ewing MD  [facility]

## 2024-04-15 NOTE — FLOWSHEET NOTE
04/15/24 1500   Vital Signs   BP (!) 145/49   Temp 98.6 °F (37 °C)   Pulse 67   Respirations 20   SpO2 94 %   Pain Assessment   Pain Assessment Critical Care Pain Observation Tool (CPOT)   Pain Level 0   Treatment   Time On 1515   Treatment Goal 2 liters   Observations & Evaluations   Level of Consciousness 1   Respiratory Quality/Effort Unlabored   O2 Device Ventilator   Bilateral Breath Sounds Clear   Skin Condition/Temp Warm   Abdomen Inspection Distended   Edema Generalized   Technical Checks   Dialysis Machine No. 08   RO Machine Number [id number]   Dialyzer Lot No. [id number]   Tubing Lot Number [id number]   All Connections Secure Yes   NS Bag Yes   Saline Line Double Clamped Yes   Dialyzer Revaclear 300   Prime Volume (mL) 200 mL   ICEBOAT I;C;E;B;O;A;T   RO Machine Log Sheet Completed Yes   Machine Alarm Self Test Completed;Passed   Air Foam Detector Tested;Proper Function   Extracorporeal Circuit Tested for Integrity Yes   Machine Conductivity 13.8   Manual Ph 7.4   Bleach Test (Neg) Yes   Bath Temperature 98.6 °F (37 °C)   Dialysis Bath   K+ (Potassium) 2   Ca+ (Calcium) 2.5   Na+ (Sodium) 138   HCO3 (Bicarb) 35   Bicarbonate Concentrate Lot No. [id number]   Acid Concentrate Lot No. [id number]   Treatment Initiation   Dialyze Hours 3.5   Treatment  Initiation Universal Precautions maintained;Lines secured to patient;Connections secured;Prime given;Venous Parameters set;Arterial Parameters set;Air foam detector engaged;Saline line double clamped;Revaclear Dialyzer   Hemodialysis Central Access Right Neck   Placement Date/Time: 04/12/24 1210   Present on Admission/Arrival: No  Inserted by: Carlos Eduardo CEJA  Insertion Practices: Chlorohexadine skin antisepsis;Hand hygiene;Maximal barrier precautions;Optimal catheter site selection;Sterile ultrasound technique...   Continued need for line? Yes   Site Assessment Clean, dry & intact   CVC Lumen Status Flushed   Venous Lumen Status Flushed   Arterial Lumen Status

## 2024-04-15 NOTE — CARE COORDINATION
Transition of Care Plan:     RUR: 25% High   Prior Level of Functioning: Independent   Disposition: SNF  Transportation at discharge: BLS   IM/IMM Medicare/ letter given: No  Is patient a  and connected with VA? No  Caregiver Contact: Daughter Hyacinth Schaeffer [phone]  Discharge Caregiver contacted prior to discharge? No  Care Conference needed? No  Barriers to discharge:    Patient transferred from [facility] for higher level of care. Per notes plan was to [facility].  Plan to discontinue CRRT today and transition to HD.   Remains intubated on Precedex  Updated clinical sent to [facility] [phone].   Nel Gonzales RN,Care Management

## 2024-04-15 NOTE — PROGRESS NOTES
[facility] visit.  Due to medical restrictions, Mrs. Doss is unable to receive communion.  No family was present at the time of the visit  Prayer for spiritual communion offered at bedside.     Sr. JACE Carolina, RN, ACSW, LCSW   Page:  [phone]

## 2024-04-15 NOTE — FLOWSHEET NOTE
04/15/24 1600   Treatment   $CRRT $Yes     CRRT discontinued by nephrologist this am, primary nurse returned blood.  Machine will be collected after successful completion of HD today.

## 2024-04-15 NOTE — PROGRESS NOTES
Day #1 of Cefazolin - Renal Dose Adjustment  Indication:  MSSA pneumonia  Current regimen:  2 gm IV Q 8 hr  Abx regimen: Monotherapy  Recent Labs     24  0613 24  0948 24  0309 04/15/24  0339   WBC 10.2  --  9.6 9.0   CREATININE 2.43* 2.24* 1.77* 1.50*   BUN 32* 30* 24* 23*     Est CrCl: ARIC on HD (CVVH stopped this morning)  Temp (24hrs), Av.1 °F (37.3 °C), Min:98.2 °F (36.8 °C), Max:99.6 °F (37.6 °C)    Cultures:    Sputum: heavy MSSA, final   Blood: NGTD   Urine: >100k E.faecalis, final   MRSA screen: negative    Plan: Now that CVVH has been transitioned to intermittent HD, the dose of cefazolin has been adjusted to 1 gm IV Q 24 hr per [facility] P&T Committee Protocol with respect to renal function.  Pharmacy will continue to monitor patient daily and will make dosage adjustments based upon changing renal function.

## 2024-04-15 NOTE — PROGRESS NOTES
I participated in the IDT meeting where the plan of care for Rozina Doss was discussed on [facility] 7S2 INTENSIVE CARE. I reviewed the patient's medical record prior to this meeting.    We will continue to follow and assess for spiritual/emotional support during this hospitalization.    Please contact  paging service for any immediate needs.      _____________________________  Pretty Rose M.Div., M.S., B.C.C.  Staff [facility]  Spiritual Health Services

## 2024-04-16 ENCOUNTER — APPOINTMENT (OUTPATIENT)
Facility: HOSPITAL | Age: 79
DRG: 356 | End: 2024-04-16
Payer: MEDICARE

## 2024-04-16 LAB
ALBUMIN SERPL-MCNC: 2.7 G/DL (ref 3.5–5)
ALBUMIN/GLOB SERPL: 1 (ref 1.1–2.2)
ALP SERPL-CCNC: 80 U/L (ref 45–117)
ALT SERPL-CCNC: 264 U/L (ref 12–78)
ANION GAP SERPL CALC-SCNC: 9 MMOL/L (ref 5–15)
ARTERIAL PATENCY WRIST A: POSITIVE
AST SERPL-CCNC: 97 U/L (ref 15–37)
BASE DEFICIT BLD-SCNC: 0.5 MMOL/L
BASOPHILS # BLD: 0 K/UL (ref 0–0.1)
BASOPHILS NFR BLD: 0 % (ref 0–1)
BDY SITE: ABNORMAL
BILIRUB SERPL-MCNC: 1.1 MG/DL (ref 0.2–1)
BUN SERPL-MCNC: 23 MG/DL (ref 6–20)
BUN/CREAT SERPL: 13 (ref 12–20)
CALCIUM SERPL-MCNC: 8.3 MG/DL (ref 8.5–10.1)
CHLORIDE SERPL-SCNC: 105 MMOL/L (ref 97–108)
CO2 SERPL-SCNC: 24 MMOL/L (ref 21–32)
CREAT SERPL-MCNC: 1.73 MG/DL (ref 0.55–1.02)
DIFFERENTIAL METHOD BLD: ABNORMAL
EOSINOPHIL # BLD: 0 K/UL (ref 0–0.4)
EOSINOPHIL NFR BLD: 0 % (ref 0–7)
ERYTHROCYTE [DISTWIDTH] IN BLOOD BY AUTOMATED COUNT: 15.8 % (ref 11.5–14.5)
GAS FLOW.O2 O2 DELIVERY SYS: ABNORMAL
GAS FLOW.O2 SETTING OXYMISER: 8 BPM
GLOBULIN SER CALC-MCNC: 2.7 G/DL (ref 2–4)
GLUCOSE BLD STRIP.AUTO-MCNC: 107 MG/DL (ref 65–117)
GLUCOSE BLD STRIP.AUTO-MCNC: 115 MG/DL (ref 65–117)
GLUCOSE BLD STRIP.AUTO-MCNC: 115 MG/DL (ref 65–117)
GLUCOSE BLD STRIP.AUTO-MCNC: 118 MG/DL (ref 65–117)
GLUCOSE SERPL-MCNC: 108 MG/DL (ref 65–100)
HBV SURFACE AG SER QL: <0.1 INDEX
HBV SURFACE AG SER QL: NEGATIVE
HCO3 BLD-SCNC: 24.2 MMOL/L (ref 22–26)
HCT VFR BLD AUTO: 23.2 % (ref 35–47)
HGB BLD-MCNC: 7.7 G/DL (ref 11.5–16)
IMM GRANULOCYTES # BLD AUTO: 0.1 K/UL (ref 0–0.04)
IMM GRANULOCYTES NFR BLD AUTO: 2 % (ref 0–0.5)
IPAP/PIP/HIGH PEEP: 16
LYMPHOCYTES # BLD: 0.9 K/UL (ref 0.8–3.5)
LYMPHOCYTES NFR BLD: 13 % (ref 12–49)
MAGNESIUM SERPL-MCNC: 2.3 MG/DL (ref 1.6–2.4)
MCH RBC QN AUTO: 31.4 PG (ref 26–34)
MCHC RBC AUTO-ENTMCNC: 33.2 G/DL (ref 30–36.5)
MCV RBC AUTO: 94.7 FL (ref 80–99)
MONOCYTES # BLD: 0.6 K/UL (ref 0–1)
MONOCYTES NFR BLD: 8 % (ref 5–13)
NEUTS SEG # BLD: 5.6 K/UL (ref 1.8–8)
NEUTS SEG NFR BLD: 77 % (ref 32–75)
NRBC # BLD: 0.02 K/UL (ref 0–0.01)
NRBC BLD-RTO: 0.3 PER 100 WBC
O2/TOTAL GAS SETTING VFR VENT: 40 %
PCO2 BLD: 38.6 MMHG (ref 35–45)
PEEP RESPIRATORY: 10 CMH2O
PH BLD: 7.41 (ref 7.35–7.45)
PHOSPHATE SERPL-MCNC: 2.2 MG/DL (ref 2.6–4.7)
PLATELET # BLD AUTO: 56 K/UL (ref 150–400)
PMV BLD AUTO: 10.8 FL (ref 8.9–12.9)
PO2 BLD: 75 MMHG (ref 80–100)
POTASSIUM SERPL-SCNC: 3.6 MMOL/L (ref 3.5–5.1)
PROT SERPL-MCNC: 5.4 G/DL (ref 6.4–8.2)
RBC # BLD AUTO: 2.45 M/UL (ref 3.8–5.2)
RBC MORPH BLD: ABNORMAL
SAO2 % BLD: 95 % (ref 92–97)
SERVICE CMNT-IMP: ABNORMAL
SERVICE CMNT-IMP: NORMAL
SODIUM SERPL-SCNC: 138 MMOL/L (ref 136–145)
SPECIMEN TYPE: ABNORMAL
TRIGL SERPL-MCNC: 209 MG/DL
VENTILATION MODE VENT: ABNORMAL
WBC # BLD AUTO: 7.2 K/UL (ref 3.6–11)

## 2024-04-16 PROCEDURE — 87340 HEPATITIS B SURFACE AG IA: CPT

## 2024-04-16 PROCEDURE — 82962 GLUCOSE BLOOD TEST: CPT

## 2024-04-16 PROCEDURE — 83735 ASSAY OF MAGNESIUM: CPT

## 2024-04-16 PROCEDURE — 82803 BLOOD GASES ANY COMBINATION: CPT

## 2024-04-16 PROCEDURE — 90935 HEMODIALYSIS ONE EVALUATION: CPT

## 2024-04-16 PROCEDURE — 99223 1ST HOSP IP/OBS HIGH 75: CPT | Performed by: NURSE PRACTITIONER

## 2024-04-16 PROCEDURE — 6360000002 HC RX W HCPCS: Performed by: NURSE PRACTITIONER

## 2024-04-16 PROCEDURE — 6360000002 HC RX W HCPCS: Performed by: INTERNAL MEDICINE

## 2024-04-16 PROCEDURE — 2500000003 HC RX 250 WO HCPCS: Performed by: STUDENT IN AN ORGANIZED HEALTH CARE EDUCATION/TRAINING PROGRAM

## 2024-04-16 PROCEDURE — 51798 US URINE CAPACITY MEASURE: CPT

## 2024-04-16 PROCEDURE — 94003 VENT MGMT INPAT SUBQ DAY: CPT

## 2024-04-16 PROCEDURE — 85025 COMPLETE CBC W/AUTO DIFF WBC: CPT

## 2024-04-16 PROCEDURE — 2500000003 HC RX 250 WO HCPCS: Performed by: INTERNAL MEDICINE

## 2024-04-16 PROCEDURE — 84478 ASSAY OF TRIGLYCERIDES: CPT

## 2024-04-16 PROCEDURE — 94660 CPAP INITIATION&MGMT: CPT

## 2024-04-16 PROCEDURE — 71045 X-RAY EXAM CHEST 1 VIEW: CPT

## 2024-04-16 PROCEDURE — 36600 WITHDRAWAL OF ARTERIAL BLOOD: CPT

## 2024-04-16 PROCEDURE — 80053 COMPREHEN METABOLIC PANEL: CPT

## 2024-04-16 PROCEDURE — 2700000000 HC OXYGEN THERAPY PER DAY

## 2024-04-16 PROCEDURE — 84100 ASSAY OF PHOSPHORUS: CPT

## 2024-04-16 PROCEDURE — 2580000003 HC RX 258: Performed by: NURSE PRACTITIONER

## 2024-04-16 PROCEDURE — 2000000000 HC ICU R&B

## 2024-04-16 PROCEDURE — 2580000003 HC RX 258: Performed by: INTERNAL MEDICINE

## 2024-04-16 PROCEDURE — 6360000002 HC RX W HCPCS: Performed by: STUDENT IN AN ORGANIZED HEALTH CARE EDUCATION/TRAINING PROGRAM

## 2024-04-16 PROCEDURE — 36415 COLL VENOUS BLD VENIPUNCTURE: CPT

## 2024-04-16 RX ORDER — FENTANYL CITRATE 50 UG/ML
25 INJECTION, SOLUTION INTRAMUSCULAR; INTRAVENOUS ONCE
Status: COMPLETED | OUTPATIENT
Start: 2024-04-16 | End: 2024-04-16

## 2024-04-16 RX ORDER — ALBUTEROL SULFATE 2.5 MG/3ML
SOLUTION RESPIRATORY (INHALATION)
Status: DISPENSED
Start: 2024-04-16 | End: 2024-04-16

## 2024-04-16 RX ORDER — LABETALOL HYDROCHLORIDE 5 MG/ML
20 INJECTION, SOLUTION INTRAVENOUS EVERY 6 HOURS PRN
Status: DISCONTINUED | OUTPATIENT
Start: 2024-04-16 | End: 2024-04-23 | Stop reason: HOSPADM

## 2024-04-16 RX ORDER — DEXMEDETOMIDINE HYDROCHLORIDE 4 UG/ML
.1-1.5 INJECTION, SOLUTION INTRAVENOUS CONTINUOUS
Status: DISCONTINUED | OUTPATIENT
Start: 2024-04-16 | End: 2024-04-18

## 2024-04-16 RX ORDER — ALBUTEROL SULFATE 2.5 MG/3ML
2.5 SOLUTION RESPIRATORY (INHALATION) EVERY 6 HOURS PRN
Status: DISCONTINUED | OUTPATIENT
Start: 2024-04-16 | End: 2024-04-23 | Stop reason: HOSPADM

## 2024-04-16 RX ORDER — ATENOLOL 50 MG/1
50 TABLET ORAL DAILY
Status: DISCONTINUED | OUTPATIENT
Start: 2024-04-17 | End: 2024-04-19

## 2024-04-16 RX ORDER — ATENOLOL 50 MG/1
100 TABLET ORAL DAILY
Status: DISCONTINUED | OUTPATIENT
Start: 2024-04-16 | End: 2024-04-16

## 2024-04-16 RX ADMIN — HEPARIN SODIUM 1100 UNITS: 1000 INJECTION INTRAVENOUS; SUBCUTANEOUS at 13:29

## 2024-04-16 RX ADMIN — HEPARIN SODIUM 5000 UNITS: 5000 INJECTION INTRAVENOUS; SUBCUTANEOUS at 06:17

## 2024-04-16 RX ADMIN — ALBUTEROL SULFATE 2.5 MG: 2.5 SOLUTION RESPIRATORY (INHALATION) at 08:49

## 2024-04-16 RX ADMIN — HEPARIN SODIUM 1400 UNITS: 1000 INJECTION INTRAVENOUS; SUBCUTANEOUS at 13:29

## 2024-04-16 RX ADMIN — HEPARIN SODIUM 5000 UNITS: 5000 INJECTION INTRAVENOUS; SUBCUTANEOUS at 15:27

## 2024-04-16 RX ADMIN — HYDRALAZINE HYDROCHLORIDE 10 MG: 20 INJECTION, SOLUTION INTRAMUSCULAR; INTRAVENOUS at 08:30

## 2024-04-16 RX ADMIN — SODIUM CHLORIDE, PRESERVATIVE FREE 10 ML: 5 INJECTION INTRAVENOUS at 08:30

## 2024-04-16 RX ADMIN — HYDRALAZINE HYDROCHLORIDE 10 MG: 20 INJECTION, SOLUTION INTRAMUSCULAR; INTRAVENOUS at 02:09

## 2024-04-16 RX ADMIN — HEPARIN SODIUM 5000 UNITS: 5000 INJECTION INTRAVENOUS; SUBCUTANEOUS at 22:15

## 2024-04-16 RX ADMIN — SODIUM CHLORIDE, PRESERVATIVE FREE 10 ML: 5 INJECTION INTRAVENOUS at 20:55

## 2024-04-16 RX ADMIN — WATER 1000 MG: 1 INJECTION INTRAMUSCULAR; INTRAVENOUS; SUBCUTANEOUS at 20:55

## 2024-04-16 RX ADMIN — ONDANSETRON 4 MG: 2 INJECTION INTRAMUSCULAR; INTRAVENOUS at 08:43

## 2024-04-16 RX ADMIN — DEXMEDETOMIDINE HYDROCHLORIDE 0.2 MCG/KG/HR: 400 INJECTION, SOLUTION INTRAVENOUS at 10:13

## 2024-04-16 RX ADMIN — DEXMEDETOMIDINE HYDROCHLORIDE 1.5 MCG/KG/HR: 400 INJECTION, SOLUTION INTRAVENOUS at 03:13

## 2024-04-16 RX ADMIN — FENTANYL CITRATE 25 MCG: 50 INJECTION INTRAMUSCULAR; INTRAVENOUS at 22:08

## 2024-04-16 ASSESSMENT — PAIN SCALES - GENERAL
PAINLEVEL_OUTOF10: 0
PAINLEVEL_OUTOF10: 0
PAINLEVEL_OUTOF10: 5
PAINLEVEL_OUTOF10: 10
PAINLEVEL_OUTOF10: 0
PAINLEVEL_OUTOF10: 10
PAINLEVEL_OUTOF10: 0
PAINLEVEL_OUTOF10: 0

## 2024-04-16 ASSESSMENT — PAIN DESCRIPTION - LOCATION
LOCATION: ABDOMEN
LOCATION: ABDOMEN

## 2024-04-16 NOTE — PROGRESS NOTES
SLP Contact Note    Attempted to see patient for swallow evaluation. Pt is currently on BiPAP and not appropriate at this time. Discussed with JUDD Cook, appreciate her expertise, and pt is expressing wishes to discontinue all care. Will hold SLP for now.      Prema Romo M.Ed, PhD(c), CCC-SLP  Speech-Language Pathologist

## 2024-04-16 NOTE — CARE COORDINATION
Transition of Care Plan:     RUR: 25% High   Prior Level of Functioning: Independent   Disposition: SNF  Transportation at discharge: BLS   IM/IMM Medicare/ letter given: No  Is patient a  and connected with VA? No  Caregiver Contact: Joshua Schaeffer [phone]  Discharge Caregiver contacted prior to discharge? No  Care Conference needed? No  Barriers to discharge:    Patient transferred from [facility] for higher level of care. Per notes plan was to discharge to [facility].  Patient extubated to Bipap this morning.   ARIEL spoke with Kristi with [facility] and made referral through [facility]. Per Krsiti she is continuing to follow.  Left a HIPAA compliant message for daughter Hyacinth Schaeffer to discuss transitions of care.   Nel Gonzales RN,Care Management    10:20 Received call back from daughter and she is in agreement for patient to be discharged to [facility] for rehab once medically stable.  Nel Gonzales RN,Care Management

## 2024-04-16 NOTE — PLAN OF CARE
Patient extubated this morning to High Flow. Respiratory status declined and placed on Bipap. Patient's BP elevated and given Hydralazine.

## 2024-04-16 NOTE — FLOWSHEET NOTE
Primary RN SBAR: NOE Betancourt, RN   Patient Education provided: Procedural, fluid management, CVC care / infection prevention  Preferred Education method and Primary language: [facility]  Hospital associated wait time; reason: none    **Hep B sAg drawn / pending     04/16/24 1225   Vital Signs   BP (!) 157/54   Temp 98.4 °F (36.9 °C)   Pulse 76   Respirations 30   SpO2 100 %   Weight - Scale 77.1 kg (169 lb 15.6 oz)   Weight Method Bed scale   Percent Weight Change 0   Pain Assessment   Pain Assessment Critical Care Pain Observation Tool (CPOT)   Pain Level 0   Treatment   Time On 1225   Time Off 1425   Treatment Goal 3L 2HR UF   Observations & Evaluations   Level of Consciousness 0   Oriented X EDDI   Heart Rhythm Regular   O2 Device Heated high flow cannula   Skin Color Pale;Ecchymosis (comment)   Skin Condition/Temp Dry;Flaky;Fragile;Swollen/edematous;Warm   Abdomen Inspection Obese   Edema Generalized   Edema Generalized Non-pitting   RUE Edema +1   LUE Edema +1   RLE Edema +1;Pitting   LLE Edema +1;Pitting   Comments Pt alert, doesnt answer questions, follows some commands, VSS, on tele monitoring. Labs reviewed, hep sAg sent stat. Procedure explained.   Technical Checks   Dialysis Machine No. 02   RO Machine Number [id number]   Dialyzer Lot No. [id number]   Tubing Lot Number [id number]   All Connections Secure Yes   NS Bag Yes   Saline Line Double Clamped Yes   Dialyzer Revaclear 300   Prime Volume (mL) 200 mL   ICEBOAT I;C;E;B;O;A;T   RO Machine Log Sheet Completed Yes   Machine Alarm Self Test Completed;Passed   Air Foam Detector Tested;Proper Function   Extracorporeal Circuit Tested for Integrity Yes   Machine Conductivity 13.8   Manual Conductivity 13.7   Manual Ph 7.4   Bleach Test (Neg) Yes   Bath Temperature   (NA - SEQ ONLY)   Dialysis Bath   K+ (Potassium)   (NA SEQ ONLY)   Ca+ (Calcium)   (NA SEQ ONLY)   Na+ (Sodium) NA   HCO3 (Bicarb) NA   Bicarbonate Concentrate Lot No. [id number]  (SET UP ONLY)   Acid Concentrate Lot

## 2024-04-16 NOTE — PROGRESS NOTES
[address], [place] [address]  Phone: [phone]   [phone]    [email or web address]     Nephrology Progress Note    Patient Name : Rozina Doss      : 1945     MRN : [id number]  Date of Admission : 2024  Date of Servive : 24    CC:  Follow up for ARIC on CKD       Assessment and Plan   ARIC on CKD IV:  - 2/2 ATN from hemorrhagic shock  - HD yesterday, 1 L UF  - isolated UF today x 3 L as tolerated  - IHD MWF    CKD IV:  - baseline Cr around 2.5  - f/b [place] [facility]     Hyperkalemia/acidosis:  - resolved     Hemorrhagic shock:  - transfused if hgb < 7  - off pressors and bp high now      Shock liver:  - trend LFTs    RP bleed s/p coil ebmolization      DM2  Obesity  HTN     Interval History:  Seen and examined.  Had only 1 L UF yesterday.  Extubated this AM. Back on BiPAP for resp distress.     Review of Systems: Pertinent items are noted in HPI.    Current Medications:   Current Facility-Administered Medications   Medication Dose Route Frequency    [Held by provider] atenolol (TENORMIN) tablet 100 mg  100 mg Oral Daily    albuterol (PROVENTIL) (2.5 MG/3ML) 0.083% nebulizer solution 2.5 mg  2.5 mg Nebulization Q6H PRN    albuterol (PROVENTIL) (2.5 MG/3ML) 0.083% nebulizer solution        hydrALAZINE (APRESOLINE) injection 10 mg  10 mg IntraVENous Q6H PRN    heparin (porcine) injection 5,000 Units  5,000 Units SubCUTAneous 3 times per day    amLODIPine (NORVASC) tablet 10 mg  10 mg Orogastric Daily    albumin human 25% IV solution 25 g  25 g IntraVENous PRN    ceFAZolin (ANCEF) 1,000 mg in sterile water 10 mL IV syringe  1,000 mg IntraVENous Q24H    oxyCODONE (ROXICODONE) immediate release tablet 5 mg  5 mg Oral Q4H PRN    Or    oxyCODONE (ROXICODONE) immediate release tablet 10 mg  10 mg Oral Q4H PRN    heparin (porcine) 1000 UNIT/ML injection 1,400 Units  1,400 Units IntraCATHeter PRN    And

## 2024-04-16 NOTE — CONSULTS
Palliative brief note-full visit documentation pending    With BIPAP on and soft voice, I was unable to understand Ms. Bullock verbal responses to questions. Per ICU Mini cam , she ha.d a good understanding of information presented.    I met with , no family at bedside.  She was very clear that in event of worsening respiratory failure, she did NOT want to be re-intubated, elected DNI.    Patient understood that in above scenario, without needed O2 support, she would likely die. She acknowledged this as being potential consequence of her decision, but remained steadfast in her decision not to be re-intubated.    Discussed CPR in setting of PEA arrest. Ms. Doss does NOT want CPR, elected DNR.    After seeing Ms. Doss,  I met with patients daughter Hyacinth Montelongo and her 2 daughters (patients granddaughters). Reviewed hospitalization and associated medical concerns.    With Ms. Bullock permission, I talked with her family about the conversation we had re Intubation/ventilator support and CPR, that she was very clear that she would NOT want either.     Family surprised to hear that patient would not want life prolonging treatments/interventions, because immediately before being hospitalized, she had completed AMD, in which she had indicated she would want Full measures.     DNR order placed in EMR.  I notified patients nurse Joey RN of change to DNR.    Palliative will follow up tomorrow.

## 2024-04-16 NOTE — PROGRESS NOTES
CRITICAL CARE NOTE      Name: Rozina Doss   : 1945   MRN: [id number]   Date: 2024      REASON FOR ICU ADMISSION:  Hypovolemic shock 2/2 retroperitoneal hemorrhage    PRINCIPAL ICU DIAGNOSIS   Hypovolemic shock 2/2 retroperitoneal hemorrhage  ARIC on RRT    BRIEF PATIENT SUMMARY     Rozina Doss is a 79 y/o female w/ PMHx CKD stage 3, T2DM, HTN, CHF who presented to [facility] ED on  initially w/ c/o cough, difficulty walking, generalized body aches, malaise. She developed flash pulmonary edema after 500ml IVF bolus and required BIPAP. She was subsequently admitted to OSH ICU for acute hypoxic respiratory failure and flash pulmonary edema 2/2 left heart failure. Today  she became acutely unresponsive and significantly hypotensive, hypothermic, bradycardic w/ severe decrease in hgb w/ no identified source of active bleeding. Hgb dropped from 7.4 -> 3.9 in 72hrs. She was intubated at OSH for airway protection, started on NE, and transfused w/ 3 units PRBC's and 2 units FFP. Arrives to [facility] ICU from IR on MV s/p embolectomy w/ coiling. Started on CRRT for ARIC and continuing blood and IVF resuscitation.     COMPREHENSIVE ASSESSMENT & PLAN:SYSTEM BASED     24 HOUR EVENTS: No acute o/n events, HD catheter removed due to positional nature causing issues w/ CRRT. Will discuss possibility of transition to iHD and timing of HD line replacement w/ nephrology. Remains on low mathis levophed.     Addendum 1700: Pt w/ increased secretions from ETT and vasopressor requirement. PanCx and broad abx.    4/15 -overnight no acute events.  SBT/SAT this a.m, unable to follow commands.  Started on tube feeds.   -no acute events overnight, extubated this a.m. to high flow nasal cannula, subsequently became tachypneic and tachycardic and placed on BiPAP.  Patient refused BiPAP on multiple occasions, she is also refused to be reintubated.  Patient's daughter was called who presented to bedside for

## 2024-04-16 NOTE — PROGRESS NOTES
Spontaneous breathing trial initiated @0500 was unsuccessful. Patient respirations were shallow and patient became tachypneic with a RR >30. Patient placed back on original AC settings. RN notified.

## 2024-04-16 NOTE — CONSULTS
Palliative Medicine  Patient Name: Rozina Doss  YOB: 1945  MRN: [id number]  Age: 78 y.o.  Gender: female    Date of Initial Consult: 4/16/2024  Date of Service: 4/17/2024  Time: 12:23 AM  Provider: MELISSA Castillo NP  Hospital Day: 9  Admit Date: 4/9/2024  Referring Provider: Dr. Steiner       Reasons for Consultation:  Goals of Care    HISTORY OF PRESENT ILLNESS (HPI):   Rozina Doss is a 78 y.o. female with a past medical history of CKD stage 3, DM2, HTN, tobacco use (quite late 2023), obesity, Multiple Falls rhabdomyolysis, debility who was admitted on 4/9/2024 from [facility] with a diagnosis of Severe anemia 2/2 bleed    4/1/2024 Ms. Butler presented to [facility] with CC of cough, fatigue, body aches and difficulty walking.  Reported legs locking up, that she has had numbness in bilateral legs times a few months.  Initially symptoms appear to be URI/viral related, IV fluids initiated, however after receiving only 500cc,  patient became very hypertensive and very SOB SOB w/ O2 sats dropping into 70s. Fluids stopped, repeat CXR no concerning for flash pulmonary edema.  Patient started on BiPAP, diuresed.    ECHO w EF 50-55%, grade 1 diastolic dysfunction    4/9 she became poorly responsive, hypothermic, bradycardic with rapid drop in hemoglobin.  Patient w/ hemorrhagic shock,  was intubated and transferred to ICU.  CT head negative for acute findings.  CT C/A/P demonstrated large right retroperitoneal hematoma, small bilateral pleural effusions with atelectasis and small groundglass tree-in-bud nodularities in left lung.  Received 3 units prbcs and 2 units FFFP, before being transferred to [facility].     4/9 patient transferred via medical flight to [facility] for embolization of right RP hemorrhage with IR.    Course of [facility] hospitalization:  4/9-patient underwent emergency retroperitoneal  embolization foll multiple coils.  Found multiple right lumbar/r  spiritual / Latter day concerns:  [] Yes /  [x] No   If \"Yes\" to discuss with pastoral care during IDT     Does caregiver feel burdened by caring for their loved one:   [] Yes /  [x] No /  [] No Caregiver Present/Available [] No Caregiver [] Pt Lives at Facility  If \"Yes\" to discuss with social work during IDT    Anticipatory grief assessment:   [x] Normal  / [] Maladaptive     If \"Maladaptive\" to discuss with social work during IDT    ESAS Anxiety: Anxiety Score: 4    ESAS Depression: Depression Score: Not depressed        LAB AND IMAGING FINDINGS:   Objective data reviewed:  labs, images, records, medication use, vitals, and chart     FINAL COMMENTS   Thank you for allowing Palliative Medicine to participate in the care of Rozina HOLLEY Herzogradha.    Only check if applicable and billing time based rather than MDM  [x] The total encounter time on this service date was __75__ minutes which was spent performing a face-to-face encounter and personally completing the provider-level activities documented in the note. This includes time spent prior to the visit and after the visit in direct care of the patient. This time does not include time spent in any separately reportable services.    Electronically signed by   MELISSA Castillo NP  Palliative Care Team  on 4/17/2024 at 12:23 AM

## 2024-04-17 ENCOUNTER — APPOINTMENT (OUTPATIENT)
Facility: HOSPITAL | Age: 79
DRG: 356 | End: 2024-04-17
Payer: MEDICARE

## 2024-04-17 PROBLEM — Z71.89 DNR (DO NOT RESUSCITATE) DISCUSSION: Status: ACTIVE | Noted: 2024-04-17

## 2024-04-17 PROBLEM — R09.02 HYPOXIA: Status: ACTIVE | Noted: 2024-04-17

## 2024-04-17 PROBLEM — Z51.5 PALLIATIVE CARE ENCOUNTER: Status: ACTIVE | Noted: 2024-04-17

## 2024-04-17 LAB
ALBUMIN SERPL-MCNC: 2.9 G/DL (ref 3.5–5)
ALBUMIN/GLOB SERPL: 1 (ref 1.1–2.2)
ALP SERPL-CCNC: 87 U/L (ref 45–117)
ALT SERPL-CCNC: 88 U/L (ref 12–78)
ANION GAP SERPL CALC-SCNC: 11 MMOL/L (ref 5–15)
AST SERPL-CCNC: 73 U/L (ref 15–37)
BACTERIA SPEC CULT: NORMAL
BACTERIA SPEC CULT: NORMAL
BASOPHILS # BLD: 0 K/UL (ref 0–0.1)
BASOPHILS NFR BLD: 0 % (ref 0–1)
BILIRUB SERPL-MCNC: 1 MG/DL (ref 0.2–1)
BUN SERPL-MCNC: 53 MG/DL (ref 6–20)
BUN/CREAT SERPL: 18 (ref 12–20)
CALCIUM SERPL-MCNC: 8.7 MG/DL (ref 8.5–10.1)
CHLORIDE SERPL-SCNC: 104 MMOL/L (ref 97–108)
CO2 SERPL-SCNC: 24 MMOL/L (ref 21–32)
CREAT SERPL-MCNC: 3.01 MG/DL (ref 0.55–1.02)
DIFFERENTIAL METHOD BLD: ABNORMAL
EOSINOPHIL # BLD: 0 K/UL (ref 0–0.4)
EOSINOPHIL NFR BLD: 0 % (ref 0–7)
ERYTHROCYTE [DISTWIDTH] IN BLOOD BY AUTOMATED COUNT: 15.9 % (ref 11.5–14.5)
GLOBULIN SER CALC-MCNC: 2.9 G/DL (ref 2–4)
GLUCOSE BLD STRIP.AUTO-MCNC: 105 MG/DL (ref 65–117)
GLUCOSE BLD STRIP.AUTO-MCNC: 110 MG/DL (ref 65–117)
GLUCOSE BLD STRIP.AUTO-MCNC: 111 MG/DL (ref 65–117)
GLUCOSE BLD STRIP.AUTO-MCNC: 115 MG/DL (ref 65–117)
GLUCOSE SERPL-MCNC: 122 MG/DL (ref 65–100)
HBV SURFACE AB SER QL: NONREACTIVE
HBV SURFACE AB SER-ACNC: 4 MIU/ML
HCT VFR BLD AUTO: 24.9 % (ref 35–47)
HGB BLD-MCNC: 8.2 G/DL (ref 11.5–16)
IMM GRANULOCYTES # BLD AUTO: 0.2 K/UL (ref 0–0.04)
IMM GRANULOCYTES NFR BLD AUTO: 2 % (ref 0–0.5)
LACTATE SERPL-SCNC: 0.6 MMOL/L (ref 0.4–2)
LYMPHOCYTES # BLD: 0.8 K/UL (ref 0.8–3.5)
LYMPHOCYTES NFR BLD: 9 % (ref 12–49)
MAGNESIUM SERPL-MCNC: 2.6 MG/DL (ref 1.6–2.4)
MCH RBC QN AUTO: 31.4 PG (ref 26–34)
MCHC RBC AUTO-ENTMCNC: 32.9 G/DL (ref 30–36.5)
MCV RBC AUTO: 95.4 FL (ref 80–99)
MONOCYTES # BLD: 0.6 K/UL (ref 0–1)
MONOCYTES NFR BLD: 7 % (ref 5–13)
NEUTS SEG # BLD: 6.8 K/UL (ref 1.8–8)
NEUTS SEG NFR BLD: 82 % (ref 32–75)
NRBC # BLD: 0.03 K/UL (ref 0–0.01)
NRBC BLD-RTO: 0.4 PER 100 WBC
PHOSPHATE SERPL-MCNC: 4.9 MG/DL (ref 2.6–4.7)
PLATELET # BLD AUTO: 70 K/UL (ref 150–400)
PMV BLD AUTO: 10.9 FL (ref 8.9–12.9)
POTASSIUM SERPL-SCNC: 3.8 MMOL/L (ref 3.5–5.1)
PROCALCITONIN SERPL-MCNC: 0.76 NG/ML
PROT SERPL-MCNC: 5.8 G/DL (ref 6.4–8.2)
RBC # BLD AUTO: 2.61 M/UL (ref 3.8–5.2)
RBC MORPH BLD: ABNORMAL
SERVICE CMNT-IMP: NORMAL
SODIUM SERPL-SCNC: 139 MMOL/L (ref 136–145)
WBC # BLD AUTO: 8.4 K/UL (ref 3.6–11)

## 2024-04-17 PROCEDURE — 51798 US URINE CAPACITY MEASURE: CPT

## 2024-04-17 PROCEDURE — 6360000002 HC RX W HCPCS: Performed by: INTERNAL MEDICINE

## 2024-04-17 PROCEDURE — 36415 COLL VENOUS BLD VENIPUNCTURE: CPT

## 2024-04-17 PROCEDURE — 80053 COMPREHEN METABOLIC PANEL: CPT

## 2024-04-17 PROCEDURE — 6360000002 HC RX W HCPCS: Performed by: STUDENT IN AN ORGANIZED HEALTH CARE EDUCATION/TRAINING PROGRAM

## 2024-04-17 PROCEDURE — 84100 ASSAY OF PHOSPHORUS: CPT

## 2024-04-17 PROCEDURE — 2000000000 HC ICU R&B

## 2024-04-17 PROCEDURE — 94660 CPAP INITIATION&MGMT: CPT

## 2024-04-17 PROCEDURE — 84145 PROCALCITONIN (PCT): CPT

## 2024-04-17 PROCEDURE — 2500000003 HC RX 250 WO HCPCS: Performed by: INTERNAL MEDICINE

## 2024-04-17 PROCEDURE — 6360000002 HC RX W HCPCS: Performed by: NURSE PRACTITIONER

## 2024-04-17 PROCEDURE — 74018 RADEX ABDOMEN 1 VIEW: CPT

## 2024-04-17 PROCEDURE — 71045 X-RAY EXAM CHEST 1 VIEW: CPT

## 2024-04-17 PROCEDURE — 2580000003 HC RX 258: Performed by: INTERNAL MEDICINE

## 2024-04-17 PROCEDURE — 2700000000 HC OXYGEN THERAPY PER DAY

## 2024-04-17 PROCEDURE — 85025 COMPLETE CBC W/AUTO DIFF WBC: CPT

## 2024-04-17 PROCEDURE — 82962 GLUCOSE BLOOD TEST: CPT

## 2024-04-17 PROCEDURE — 94760 N-INVAS EAR/PLS OXIMETRY 1: CPT

## 2024-04-17 PROCEDURE — 83735 ASSAY OF MAGNESIUM: CPT

## 2024-04-17 PROCEDURE — 92612 ENDOSCOPY SWALLOW (FEES) VID: CPT

## 2024-04-17 PROCEDURE — 86706 HEP B SURFACE ANTIBODY: CPT

## 2024-04-17 PROCEDURE — 90935 HEMODIALYSIS ONE EVALUATION: CPT

## 2024-04-17 PROCEDURE — 83605 ASSAY OF LACTIC ACID: CPT

## 2024-04-17 PROCEDURE — 6370000000 HC RX 637 (ALT 250 FOR IP): Performed by: INTERNAL MEDICINE

## 2024-04-17 PROCEDURE — 2580000003 HC RX 258: Performed by: NURSE PRACTITIONER

## 2024-04-17 RX ADMIN — HEPARIN SODIUM 1100 UNITS: 1000 INJECTION INTRAVENOUS; SUBCUTANEOUS at 16:05

## 2024-04-17 RX ADMIN — WATER 2 MG: 1 INJECTION INTRAMUSCULAR; INTRAVENOUS; SUBCUTANEOUS at 11:13

## 2024-04-17 RX ADMIN — ATENOLOL 50 MG: 50 TABLET ORAL at 07:52

## 2024-04-17 RX ADMIN — WATER 2 MG: 1 INJECTION INTRAMUSCULAR; INTRAVENOUS; SUBCUTANEOUS at 11:12

## 2024-04-17 RX ADMIN — SODIUM CHLORIDE, PRESERVATIVE FREE 10 ML: 5 INJECTION INTRAVENOUS at 21:46

## 2024-04-17 RX ADMIN — AMLODIPINE BESYLATE 10 MG: 5 TABLET ORAL at 07:52

## 2024-04-17 RX ADMIN — HYDRALAZINE HYDROCHLORIDE 10 MG: 20 INJECTION, SOLUTION INTRAMUSCULAR; INTRAVENOUS at 00:34

## 2024-04-17 RX ADMIN — HEPARIN SODIUM 5000 UNITS: 5000 INJECTION INTRAVENOUS; SUBCUTANEOUS at 06:14

## 2024-04-17 RX ADMIN — WATER 1000 MG: 1 INJECTION INTRAMUSCULAR; INTRAVENOUS; SUBCUTANEOUS at 21:30

## 2024-04-17 RX ADMIN — HEPARIN SODIUM 5000 UNITS: 5000 INJECTION INTRAVENOUS; SUBCUTANEOUS at 14:14

## 2024-04-17 RX ADMIN — HEPARIN SODIUM 1400 UNITS: 1000 INJECTION INTRAVENOUS; SUBCUTANEOUS at 16:05

## 2024-04-17 RX ADMIN — HEPARIN SODIUM 5000 UNITS: 5000 INJECTION INTRAVENOUS; SUBCUTANEOUS at 21:36

## 2024-04-17 RX ADMIN — DEXMEDETOMIDINE HYDROCHLORIDE 0.4 MCG/KG/HR: 400 INJECTION, SOLUTION INTRAVENOUS at 10:01

## 2024-04-17 RX ADMIN — SODIUM CHLORIDE, PRESERVATIVE FREE 10 ML: 5 INJECTION INTRAVENOUS at 07:53

## 2024-04-17 RX ADMIN — DEXMEDETOMIDINE HYDROCHLORIDE 0.4 MCG/KG/HR: 400 INJECTION, SOLUTION INTRAVENOUS at 00:32

## 2024-04-17 ASSESSMENT — PAIN SCALES - GENERAL
PAINLEVEL_OUTOF10: 3
PAINLEVEL_OUTOF10: 10
PAINLEVEL_OUTOF10: 4
PAINLEVEL_OUTOF10: 0
PAINLEVEL_OUTOF10: 3

## 2024-04-17 ASSESSMENT — PAIN DESCRIPTION - LOCATION
LOCATION: ABDOMEN

## 2024-04-17 NOTE — PLAN OF CARE
Speech Language Pathology  Flexible Endoscopic Evaluation of Swallowing-FEES  Patient: Rozina Doss (78 y.o. female)  Date: 4/17/2024  Primary Diagnosis: Hemorrhagic shock (HCC) [R57.8]       Precautions:                      ASSESSMENT :  Patient seen for Flexible Endoscopic Evaluation of Swallow (FEES) at bedside to objectively assess swallow. Patient with subjectively functional oral phase of swallow within the confines of this particular test (solids deferred given increased work of breathing and decreased respiratory status). Pt with moderate pharyngeal dysphagia characterized by delayed swallow initiation with both thin liquids and mildly-thick liquids resulting in penetration and subsequent aspiration before the swallow. Patient sensate to aspirate, but was unable to fully clear airway due to reduced cough strength. No aspiration nor penetration present with moderately-thick liquids nor puree consistencies. Mild vallecular and piriform sinus residue present with all PO trials due to weak pharyngeal squeeze likely consistent with pt's prolonged intubation and overall weakness/debility from complex medical course.     At this juncture, recommend pt initiate modified diet as outlined below with strict aspiration precautions and low threshold to hold PO with any signs of intolerance due to already compromised respiratory status. Suspect pt's swallow function to improve as intubation becomes more remote, pending overall improvement in respiratory status. Given pt sensate to penetration/aspiration, will not necessarily require repeat objective imaging to advance diet unless otherwise indicated. Will continue to follow.    Patient will benefit from skilled intervention to address the above impairments.     Images taken from FEES:    Aspiration with thin liquids (present before the swallow)      Penetration/aspiration before the swallow was initiated with thin liquids      Mildly-thick liquid

## 2024-04-17 NOTE — PLAN OF CARE
Approached by RN that patient's daughters were concerned with code status change to DNR/DNI and that they discussed this with patient who is agreeable to these interventions.  This was confirmed by bedside RN.  I spoke with patient.  She is alert and oriented, answered all orientation questions correctly.  When asked if she would like intubation and CPR, she nodded yes to both.  Code status updated in chart to Full Code at this time.  Patient is currently stable on NIV and will remain on this overnight.  Will defer further GOC discussions with palliative and day shift ICU team.    [facility]

## 2024-04-17 NOTE — PROGRESS NOTES
[address], [facility] [place] [address]  Phone: [phone]   [phone]    [email or web address]     Nephrology Progress Note    Patient Name : Rozina Doss      : 1945     MRN : [id number]  Date of Admission : 2024  Date of Servive : 24    CC:  Follow up for ARIC on CKD       Assessment and Plan   ARIC on CKD IV:  - 2/2 ATN from hemorrhagic shock  - HD for today  - may need new line    CKD IV:  - baseline Cr around 2.5  - f/b [place] [facility]     Hyperkalemia/acidosis:  - resolved     Hemorrhagic shock:  - transfused if hgb < 7  - off pressors and bp high now      Shock liver:  - trend LFTs    RP bleed s/p coil ebmolization      DM2  Obesity  HTN     Interval History:  Seen and examined.  Had isolated UF yesterday. Feeling better.  Granddaughter at bedside.  BP stable.  Will be getting HD later today    Review of Systems: Pertinent items are noted in HPI.    Current Medications:   Current Facility-Administered Medications   Medication Dose Route Frequency    albuterol (PROVENTIL) (2.5 MG/3ML) 0.083% nebulizer solution 2.5 mg  2.5 mg Nebulization Q6H PRN    dexmedeTOMIDine (PRECEDEX) 400 mcg in sodium chloride 0.9 % 100 mL infusion  0.1-1.5 mcg/kg/hr IntraVENous Continuous    labetalol (NORMODYNE;TRANDATE) injection 20 mg  20 mg IntraVENous Q6H PRN    atenolol (TENORMIN) tablet 50 mg  50 mg Oral Daily    hydrALAZINE (APRESOLINE) injection 10 mg  10 mg IntraVENous Q6H PRN    heparin (porcine) injection 5,000 Units  5,000 Units SubCUTAneous 3 times per day    amLODIPine (NORVASC) tablet 10 mg  10 mg Orogastric Daily    albumin human 25% IV solution 25 g  25 g IntraVENous PRN    ceFAZolin (ANCEF) 1,000 mg in sterile water 10 mL IV syringe  1,000 mg IntraVENous Q24H    oxyCODONE (ROXICODONE) immediate release tablet 5 mg  5 mg Oral Q4H PRN    Or    oxyCODONE (ROXICODONE) immediate release tablet 10 mg  10 mg Oral Q4H PRN

## 2024-04-17 NOTE — FLOWSHEET NOTE
04/17/24 1030   Hemodialysis Central Access Right Neck   Placement Date/Time: 04/12/24 1210   Present on Admission/Arrival: No  Inserted by: Carlos Eduardo CEJA  Insertion Practices: Chlorohexadine skin antisepsis;Hand hygiene;Maximal barrier precautions;Optimal catheter site selection;Sterile ultrasound technique...   Venous Lumen Status Brisk blood return   Arterial Lumen Status Sluggish blood return  (evaluated access for HD today, discussed with nephrology will cathflo)     Primary RN SBAR: HOLLEY Betancourt RN   Patient Education provided: education with family (granddaughter) dysfunctional cvc  Preferred Education method and Primary language: granddaughter verbal/english  Hospital associated wait time; reason: 0  Hepatitis B Surface Ag   Date/Time Value Ref Range Status   04/16/2024 12:37 PM <0.10 Index Final     Hep B S Ab   Date/Time Value Ref Range Status   04/17/2024 09:55 AM 4.00 mIU/mL Final     Comments:  HD cvc catheter evaluated for dysfunction, extremely sluggish red limb, flushes easily, cathflo ordered to instill before HD.  Cathflo instilled per policy.  Granddaughter in at bedside educated on the plan, all questions answered.

## 2024-04-17 NOTE — PROGRESS NOTES
CRITICAL CARE NOTE      Name: Rozina Doss   : 1945   MRN: [id number]   Date: 2024      REASON FOR ICU ADMISSION:  Hypovolemic shock 2/2 retroperitoneal hemorrhage    PRINCIPAL ICU DIAGNOSIS   Hypovolemic shock 2/2 retroperitoneal hemorrhage  ARIC on RRT    BRIEF PATIENT SUMMARY     Rozina Doss is a 77 y/o female w/ PMHx CKD stage 3, T2DM, HTN, CHF who presented to [facility] ED on  initially w/ c/o cough, difficulty walking, generalized body aches, malaise. She developed flash pulmonary edema after 500ml IVF bolus and required BIPAP. She was subsequently admitted to OSH ICU for acute hypoxic respiratory failure and flash pulmonary edema 2/2 left heart failure. Today  she became acutely unresponsive and significantly hypotensive, hypothermic, bradycardic w/ severe decrease in hgb w/ no identified source of active bleeding. Hgb dropped from 7.4 -> 3.9 in 72hrs. She was intubated at OSH for airway protection, started on NE, and transfused w/ 3 units PRBC's and 2 units FFP. Arrives to [facility] ICU from IR on MV s/p embolectomy w/ coiling. Started on CRRT for ARIC and continuing blood and IVF resuscitation.     COMPREHENSIVE ASSESSMENT & PLAN:SYSTEM BASED     24 HOUR EVENTS: No acute o/n events, HD catheter removed due to positional nature causing issues w/ CRRT. Will discuss possibility of transition to iHD and timing of HD line replacement w/ nephrology. Remains on low mathis levophed.     Addendum 1700: Pt w/ increased secretions from ETT and vasopressor requirement. PanCx and broad abx.    4/15 -overnight no acute events.  SBT/SAT this a.m, unable to follow commands.  Started on tube feeds.   -no acute events overnight, extubated this a.m. to high flow nasal cannula, subsequently became tachypneic and tachycardic and placed on BiPAP.  Patient refused BiPAP on multiple occasions, she is also refused to be reintubated.  Patient's daughter was called who presented to bedside for  coordination of care.  This does not include any procedural time which has been billed separately.    Mi Steiner MD  Staff Intensivist  [facility]

## 2024-04-17 NOTE — PROGRESS NOTES
SLP Contact Note    Update: FEES complete. Recommend pureed diet/moderately-thick liquids. Full note to follow.      ---------------------------------------------    Noted pt able to wean from BiPAP and is on high flow. Given high risk for silent aspiration with low threshold to tolerate any amount of aspiration, will plan to complete a Flexible Endoscopic Evaluation of Swallow (FEES) at bedside to objectively assess safety of swallow. Recommendations to follow.      Prema Romo M.Ed, PhD(c), CCC-SLP  Speech-Language Pathologist

## 2024-04-17 NOTE — PROGRESS NOTES
I visited Rozina Doss for a palliative care visit. She was asleep for the duration of the visit.  Her grand daughter Genevieve was at bedside. The patient lives with one of her daughters, and others in the home. All work and she does not have some one with her all the time. Genevieve has concerns about what the patient's life will look like going forward.  The patient is a Pentec[employer]al and sn active Jainism. She is on the list to be visited by Fr Vega today.   Chaplain Ramandeep, Estee, MS, BCC

## 2024-04-17 NOTE — FLOWSHEET NOTE
POSITIONAL LINE, functions best with HOB 30* and pt turned to right side.  PRE HD: 04/17/24 1300   Vital Signs   BP (!) 147/59   Temp 97.9 °F (36.6 °C)   Pulse 68   Respirations 21   SpO2 100 %   Pain Assessment   Pain Assessment 0-10   Pain Level 3   Treatment   Time On 1300   Time Off 1600   Observations & Evaluations   Level of Consciousness 0   Heart Rhythm Regular  (ICU Monitoring)   Respiratory Quality/Effort Unlabored   O2 Device Heated high flow cannula   Bilateral Breath Sounds Clear   Skin Condition/Temp Warm;Dry;Fragile   Abdomen Inspection Soft   Edema Generalized   Edema Generalized Non-pitting   Technical Checks   Dialysis Machine No. 05   RO Machine Number [id number]   Dialyzer Lot No. [id number]   Tubing Lot Number [id number]   All Connections Secure Yes   NS Bag Yes   Saline Line Double Clamped Yes   Dialyzer Revaclear 300   Prime Volume (mL) 200 mL   ICEBOAT I;C;E;B;O;A;T   RO Machine Log Sheet Completed Yes   Machine Alarm Self Test Completed;Passed   Air Foam Detector Tested;Proper Function   Extracorporeal Circuit Tested for Integrity Yes   Machine Conductivity 13.8   Manual Conductivity 14.1   Manual Ph 7.4   Bleach Test (Neg) Yes   Bath Temperature 98.6 °F (37 °C)   Dialysis Bath   K+ (Potassium) 3   Ca+ (Calcium) 2.5   Hemodialysis Central Access Right Neck   Placement Date/Time: 04/12/24 1210   Present on Admission/Arrival: No  Inserted by: Carlos Eduardo CEJA  Insertion Practices: Chlorohexadine skin antisepsis;Hand hygiene;Maximal barrier precautions;Optimal catheter site selection;Sterile ultrasound technique...   Continued need for line? Yes   Site Assessment Clean, dry & intact   Venous Lumen Status Brisk blood return;Flushed;Infusing   Arterial Lumen Status Sluggish blood return;Flushed;Infusing   Line Care Ports disinfected;Connections checked and tightened   Dressing Type Bacteriocidal;Sterile dressing, transparent   Date of Last Dressing Change 04/16/24   Dressing Status Clean, dry & intact    Dressing Change Due 04/19/24   Primary RN SBAR: NOE Betancourt RN  Patient Education provided: CVC precautions  Preferred Education method and Primary language: demonstration/English    Hepatitis B Surface Ag   Date/Time Value Ref Range Status   04/16/2024 12:37 PM <0.10 Index Final     Hep B S Ab   Date/Time Value Ref Range Status   04/17/2024 09:55 AM 4.00 mIU/mL Final     POST HD: 04/17/24 1600   Vital Signs   BP (!) 105/46   Pulse 68   Respirations 23   SpO2 100 %   Pain Assessment   Pain Assessment None - Denies Pain   Post-Hemodialysis Assessment   Post-Treatment Procedures Catheter capped, clamped and heparinized x 2 ports   Machine Disinfection Process Acid/Vinegar Clean;Heat Disinfect;Exterior Machine Disinfection   Rinseback Volume (ml) 200 ml   Blood Volume Processed (Liters) 67 L   Dialyzer Clearance Lightly streaked   Duration of Treatment (minutes) 180 minutes   Hemodialysis Intake (ml) 500 ml   Hemodialysis Output (ml) 2200 ml   NET Removed (ml) 1700   Tolerated Treatment Fair   Interventions Taken Ultrafiltration stopped  (UF stopped in last 30 mins of treatment d/t low BPs)   Patient Response to Treatment Treatment time met   Bilateral Breath Sounds Clear   Edema Generalized   Edema Generalized Non-pitting   Time Off 1600   Patient Disposition Remain in ICU/ED   Observations & Evaluations   Level of Consciousness 0   Heart Rhythm   (ICU monitoring)   Respiratory Quality/Effort Unlabored   O2 Device Nasal cannula   Skin Condition/Temp Warm;Dry;Fragile   Abdomen Inspection Soft   Primary RN SBAR: NOE Betancourt, RN  Comments: UF stopped with 30 mins remaining in treatment time. All possible blood rinsed back. Call bell within reach, family at bedside.

## 2024-04-17 NOTE — PROGRESS NOTES
I participated in the IDT meeting where the plan of care for Rozina Doss was discussed on [facility] 7S2 INTENSIVE CARE. I reviewed the patient's medical record prior to this meeting.    We will continue to follow and assess for spiritual/emotional support during this hospitalization.    Please contact  paging service for any immediate needs.      _____________________________  Pretty Rose M.Div., M.S., B.C.C.  Staff [facility]  Spiritual Health Services

## 2024-04-18 ENCOUNTER — APPOINTMENT (OUTPATIENT)
Facility: HOSPITAL | Age: 79
DRG: 356 | End: 2024-04-18
Payer: MEDICARE

## 2024-04-18 LAB
ALBUMIN SERPL-MCNC: 2.8 G/DL (ref 3.5–5)
ALBUMIN/GLOB SERPL: 0.8 (ref 1.1–2.2)
ALP SERPL-CCNC: 85 U/L (ref 45–117)
ALT SERPL-CCNC: 42 U/L (ref 12–78)
ANION GAP SERPL CALC-SCNC: 9 MMOL/L (ref 5–15)
AST SERPL-CCNC: 52 U/L (ref 15–37)
BASOPHILS # BLD: 0 K/UL (ref 0–0.1)
BASOPHILS NFR BLD: 0 % (ref 0–1)
BILIRUB SERPL-MCNC: 1 MG/DL (ref 0.2–1)
BUN SERPL-MCNC: 41 MG/DL (ref 6–20)
BUN/CREAT SERPL: 15 (ref 12–20)
CALCIUM SERPL-MCNC: 8.9 MG/DL (ref 8.5–10.1)
CHLORIDE SERPL-SCNC: 104 MMOL/L (ref 97–108)
CO2 SERPL-SCNC: 26 MMOL/L (ref 21–32)
CREAT SERPL-MCNC: 2.82 MG/DL (ref 0.55–1.02)
DIFFERENTIAL METHOD BLD: ABNORMAL
EOSINOPHIL # BLD: 0 K/UL (ref 0–0.4)
EOSINOPHIL NFR BLD: 0 % (ref 0–7)
ERYTHROCYTE [DISTWIDTH] IN BLOOD BY AUTOMATED COUNT: 15.9 % (ref 11.5–14.5)
GLOBULIN SER CALC-MCNC: 3.5 G/DL (ref 2–4)
GLUCOSE BLD STRIP.AUTO-MCNC: 145 MG/DL (ref 65–117)
GLUCOSE BLD STRIP.AUTO-MCNC: 160 MG/DL (ref 65–117)
GLUCOSE BLD STRIP.AUTO-MCNC: 96 MG/DL (ref 65–117)
GLUCOSE BLD STRIP.AUTO-MCNC: 98 MG/DL (ref 65–117)
GLUCOSE SERPL-MCNC: 96 MG/DL (ref 65–100)
HCT VFR BLD AUTO: 24.4 % (ref 35–47)
HGB BLD-MCNC: 8 G/DL (ref 11.5–16)
IMM GRANULOCYTES # BLD AUTO: 0.2 K/UL (ref 0–0.04)
IMM GRANULOCYTES NFR BLD AUTO: 2 % (ref 0–0.5)
LYMPHOCYTES # BLD: 0.9 K/UL (ref 0.8–3.5)
LYMPHOCYTES NFR BLD: 10 % (ref 12–49)
MAGNESIUM SERPL-MCNC: 2.3 MG/DL (ref 1.6–2.4)
MCH RBC QN AUTO: 31.4 PG (ref 26–34)
MCHC RBC AUTO-ENTMCNC: 32.8 G/DL (ref 30–36.5)
MCV RBC AUTO: 95.7 FL (ref 80–99)
MONOCYTES # BLD: 0.6 K/UL (ref 0–1)
MONOCYTES NFR BLD: 6 % (ref 5–13)
NEUTS SEG # BLD: 7.6 K/UL (ref 1.8–8)
NEUTS SEG NFR BLD: 82 % (ref 32–75)
NRBC # BLD: 0 K/UL (ref 0–0.01)
NRBC BLD-RTO: 0 PER 100 WBC
PHOSPHATE SERPL-MCNC: 3.7 MG/DL (ref 2.6–4.7)
PLATELET # BLD AUTO: 95 K/UL (ref 150–400)
PMV BLD AUTO: 11.5 FL (ref 8.9–12.9)
POTASSIUM SERPL-SCNC: 3.6 MMOL/L (ref 3.5–5.1)
PROT SERPL-MCNC: 6.3 G/DL (ref 6.4–8.2)
RBC # BLD AUTO: 2.55 M/UL (ref 3.8–5.2)
RBC MORPH BLD: ABNORMAL
SERVICE CMNT-IMP: ABNORMAL
SERVICE CMNT-IMP: ABNORMAL
SERVICE CMNT-IMP: NORMAL
SERVICE CMNT-IMP: NORMAL
SODIUM SERPL-SCNC: 139 MMOL/L (ref 136–145)
WBC # BLD AUTO: 9.3 K/UL (ref 3.6–11)

## 2024-04-18 PROCEDURE — 2580000003 HC RX 258: Performed by: INTERNAL MEDICINE

## 2024-04-18 PROCEDURE — 6360000002 HC RX W HCPCS: Performed by: INTERNAL MEDICINE

## 2024-04-18 PROCEDURE — 6370000000 HC RX 637 (ALT 250 FOR IP): Performed by: INTERNAL MEDICINE

## 2024-04-18 PROCEDURE — 84100 ASSAY OF PHOSPHORUS: CPT

## 2024-04-18 PROCEDURE — 97535 SELF CARE MNGMENT TRAINING: CPT

## 2024-04-18 PROCEDURE — 71045 X-RAY EXAM CHEST 1 VIEW: CPT

## 2024-04-18 PROCEDURE — 80053 COMPREHEN METABOLIC PANEL: CPT

## 2024-04-18 PROCEDURE — 2580000003 HC RX 258: Performed by: NURSE PRACTITIONER

## 2024-04-18 PROCEDURE — 2700000000 HC OXYGEN THERAPY PER DAY

## 2024-04-18 PROCEDURE — 94760 N-INVAS EAR/PLS OXIMETRY 1: CPT

## 2024-04-18 PROCEDURE — 85025 COMPLETE CBC W/AUTO DIFF WBC: CPT

## 2024-04-18 PROCEDURE — 82962 GLUCOSE BLOOD TEST: CPT

## 2024-04-18 PROCEDURE — 97162 PT EVAL MOD COMPLEX 30 MIN: CPT

## 2024-04-18 PROCEDURE — 97550 CAREGIVER TRAING 1ST 30 MIN: CPT

## 2024-04-18 PROCEDURE — 97530 THERAPEUTIC ACTIVITIES: CPT

## 2024-04-18 PROCEDURE — 90935 HEMODIALYSIS ONE EVALUATION: CPT

## 2024-04-18 PROCEDURE — 6360000002 HC RX W HCPCS: Performed by: NURSE PRACTITIONER

## 2024-04-18 PROCEDURE — 6360000002 HC RX W HCPCS: Performed by: STUDENT IN AN ORGANIZED HEALTH CARE EDUCATION/TRAINING PROGRAM

## 2024-04-18 PROCEDURE — 2060000000 HC ICU INTERMEDIATE R&B

## 2024-04-18 PROCEDURE — 2500000003 HC RX 250 WO HCPCS: Performed by: INTERNAL MEDICINE

## 2024-04-18 PROCEDURE — 83735 ASSAY OF MAGNESIUM: CPT

## 2024-04-18 PROCEDURE — 36415 COLL VENOUS BLD VENIPUNCTURE: CPT

## 2024-04-18 PROCEDURE — 97166 OT EVAL MOD COMPLEX 45 MIN: CPT

## 2024-04-18 RX ADMIN — ONDANSETRON 4 MG: 2 INJECTION INTRAMUSCULAR; INTRAVENOUS at 09:19

## 2024-04-18 RX ADMIN — HEPARIN SODIUM 5000 UNITS: 5000 INJECTION INTRAVENOUS; SUBCUTANEOUS at 14:38

## 2024-04-18 RX ADMIN — SODIUM CHLORIDE, PRESERVATIVE FREE 10 ML: 5 INJECTION INTRAVENOUS at 21:38

## 2024-04-18 RX ADMIN — SODIUM CHLORIDE, PRESERVATIVE FREE 10 ML: 5 INJECTION INTRAVENOUS at 08:33

## 2024-04-18 RX ADMIN — HEPARIN SODIUM 1100 UNITS: 1000 INJECTION INTRAVENOUS; SUBCUTANEOUS at 15:59

## 2024-04-18 RX ADMIN — WATER 1000 MG: 1 INJECTION INTRAMUSCULAR; INTRAVENOUS; SUBCUTANEOUS at 21:37

## 2024-04-18 RX ADMIN — DEXMEDETOMIDINE HYDROCHLORIDE 0.2 MCG/KG/HR: 400 INJECTION, SOLUTION INTRAVENOUS at 00:58

## 2024-04-18 RX ADMIN — AMLODIPINE BESYLATE 10 MG: 5 TABLET ORAL at 08:32

## 2024-04-18 RX ADMIN — HEPARIN SODIUM 1400 UNITS: 1000 INJECTION INTRAVENOUS; SUBCUTANEOUS at 15:59

## 2024-04-18 RX ADMIN — HEPARIN SODIUM 5000 UNITS: 5000 INJECTION INTRAVENOUS; SUBCUTANEOUS at 21:38

## 2024-04-18 RX ADMIN — ATENOLOL 50 MG: 50 TABLET ORAL at 08:32

## 2024-04-18 RX ADMIN — HEPARIN SODIUM 5000 UNITS: 5000 INJECTION INTRAVENOUS; SUBCUTANEOUS at 06:15

## 2024-04-18 ASSESSMENT — PAIN SCALES - GENERAL
PAINLEVEL_OUTOF10: 0
PAINLEVEL_OUTOF10: 0
PAINLEVEL_OUTOF10: 10
PAINLEVEL_OUTOF10: 0
PAINLEVEL_OUTOF10: 10

## 2024-04-18 ASSESSMENT — PAIN DESCRIPTION - PAIN TYPE
TYPE: ACUTE PAIN

## 2024-04-18 ASSESSMENT — PAIN DESCRIPTION - LOCATION
LOCATION: ABDOMEN

## 2024-04-18 ASSESSMENT — PULMONARY FUNCTION TESTS: PIF_VALUE: 33

## 2024-04-18 NOTE — PROGRESS NOTES
[address], [facility] [place] [address]  Phone: [phone]   [phone]    [email or web address] Notes     Nephrology Progress Note    Patient Name : Rozina Doss      : 1945     MRN : [id number]  Date of Admission : 2024  Date of Servive : 24    CC:  Follow up for ARIC on CKD       Assessment and Plan   ARIC on CKD IV:  - 2/2 ATN from hemorrhagic shock  - HD MWF while here  - isolated UF today 2 L  - will need PC prior to d/c    CKD IV:  - baseline Cr around 2.5  - f/b [facility]     Hyperkalemia/acidosis:  - resolved     Hemorrhagic shock:  - transfused if hgb < 7  - off pressors and bp high now      Shock liver:  - trend LFTs    RP bleed s/p coil ebmolization      DM2  Obesity  HTN     Interval History:  Seen and examined.  Feeling better O2 requirements better.  Awaiting transfer out of ICU.  Tolerated hemo yesterday    Review of Systems: Pertinent items are noted in HPI.    Current Medications:   Current Facility-Administered Medications   Medication Dose Route Frequency    albuterol (PROVENTIL) (2.5 MG/3ML) 0.083% nebulizer solution 2.5 mg  2.5 mg Nebulization Q6H PRN    dexmedeTOMIDine (PRECEDEX) 400 mcg in sodium chloride 0.9 % 100 mL infusion  0.1-1.5 mcg/kg/hr IntraVENous Continuous    labetalol (NORMODYNE;TRANDATE) injection 20 mg  20 mg IntraVENous Q6H PRN    atenolol (TENORMIN) tablet 50 mg  50 mg Oral Daily    hydrALAZINE (APRESOLINE) injection 10 mg  10 mg IntraVENous Q6H PRN    heparin (porcine) injection 5,000 Units  5,000 Units SubCUTAneous 3 times per day    amLODIPine (NORVASC) tablet 10 mg  10 mg Orogastric Daily    albumin human 25% IV solution 25 g  25 g IntraVENous PRN    ceFAZolin (ANCEF) 1,000 mg in sterile water 10 mL IV syringe  1,000 mg IntraVENous Q24H    oxyCODONE (ROXICODONE) immediate release tablet 5 mg  5 mg Oral Q4H PRN    Or    oxyCODONE (ROXICODONE) immediate release tablet 10 mg

## 2024-04-18 NOTE — H&P
History and Physical    Date of Service:  4/18/2024  Primary Care Provider: Alvin Kaiser DO  Source of information: The patient and Chart review    Chief Complaint: No chief complaint on file.      History of Presenting Illness:   Rozina Doss is a 78 y.o. female who was initially admitted to [facility] on 4/1.  Patient initially presented with cough, malaise and weakness.  Subsequent developed flash pulmonary edema and required BiPAP.  Patient was admitted to ICU at [facility].  On 4/9, patient became significantly hypotensive, hypothermic and workup shows that hemoglobin has significantly dropped from 7.4-3.9.  CT angiogram of the abdomen and pelvis to enlarging right retroperitoneal hematoma with active bleeding.  Subsequently patient was intubated at the outside hospital, started on pressors, also began transfusion of 3 units PRBCs and 2 units FFP.  Patient subsequently transferred to [facility] for IR evaluation.  Patient was evaluated by IR upon arrival at [facility] and went for arteriogram which shows multiple right lumbar/retroperitoneal arteries showing active bleeding and subsequently embolization was performed.  Patient was intubated for the procedure and subsequently admitted to ICU.  Patient was able to be extubated on 4/16 and transition to BiPAP.  Patient also developed ARIC due to ATN from hemorrhagic shock and started on CRRT this admission and transition to HD MWF.  Nephrology following.  Patient has been seen by speech therapy and started on diet.  Patient transferred out of ICU today to assume care under the hospitalist service.    The patient denies any headache, blurry vision, sore throat, trouble swallowing, trouble with speech, chest pain, SOB, cough, fever, chills, N/V/D, abd pain, urinary symptoms, constipation, recent travels, sick contacts, focal or generalized neurological symptoms, falls, injuries, rashes, contact with COVID-19  atelectasis following   extubation.         XR CHEST PORTABLE   Final Result   1. Pulmonary edema pattern has increased in the interval. No pneumothorax.      XR CHEST PORTABLE   Final Result      Stable support devices. Slightly decreased mild pulmonary edema.         XR CHEST PORTABLE   Final Result   Increased pulmonary edema. Status post right IJ central venous   catheter placement without evidence of pneumothorax.            XR ABDOMEN (KUB) (SINGLE AP VIEW)   Final Result   Decreased ileus pattern of the bowels. No new finding.      XR ABDOMEN (KUB) (SINGLE AP VIEW)   Final Result   NG tube satisfactory position. Diffuse bowel distention.         XR CHEST PORTABLE   Final Result   Right IJ catheter satisfactory position without pneumothorax.         XR CHEST PORTABLE    (Results Pending)        ECG/ECHO:  [unfilled]       Notes reviewed from all clinical/nonclinical/nursing services involved in patient's clinical care. Care coordination discussions were held with appropriate clinical/nonclinical/ nursing providers based on care coordination needs.     Assessment:   Given the patient's current clinical presentation, there is a high level of concern for decompensation if discharged from the emergency department. Complex decision making was performed, which includes reviewing the patient's available past medical records, laboratory results, and imaging studies.    Principal Problem:    Hemorrhagic shock (HCC)  Active Problems:    Palliative care encounter    Hypoxia    DNR (do not resuscitate) discussion  Resolved Problems:    * No resolved hospital problems. *      Plan:     Hemorrhagic shock    Acute hypoxic respiratory failure  -Multifactorial including hemorrhagic shock, acute pulmonary edema, suspect bacterial pneumonia  -Patient required intubation mechanical ventilation from 4/9 to 4/17, not on BiPAP as needed  -Volume control through HD, continue antibiotics  -Manage underlying etiologies, wean

## 2024-04-18 NOTE — PLAN OF CARE
Problem: Occupational Therapy - Adult  Goal: By Discharge: Performs self-care activities at highest level of function for planned discharge setting.  See evaluation for individualized goals.  Description: FUNCTIONAL STATUS PRIOR TO ADMISSION:  Patient was ambulatory using a RW vs furniture surfing PTA. Per pt's daughter at bedside pt has had a progressive decline in functional mobility over the last year.     HOME SUPPORT: Patient lived with her family and was mod I with basic ADLs. She does not take full showers, instead elects to wash up at her sink.     Occupational Therapy Goals:  Initiated 4/18/2024  1.  Patient will perform grooming in supported sitting with Maximal Assist within 7 day(s).  2.  Patient will perform anterior neck to thigh bathing in supported sitting with Maximal Assist within 7 day(s).  3.  Patient will perform upper body dressing with Maximal Assist within 7 day(s).  4.  Patient will perform lateral rolling in prep for completion of toileting routine with Maximal Assist within 7 day(s).   5.  Patient will participate in upper extremity therapeutic exercise/activities with Supervision for 3 minutes within 7 day(s).        Outcome: Progressing   OCCUPATIONAL THERAPY EVALUATION    Patient: Rozina Doss (78 y.o. female)  Date: 4/18/2024  Primary Diagnosis: Hemorrhagic shock (HCC) [R57.8]         Precautions:                    ASSESSMENT :  The patient's performance of ADL/IADL tasks is limited at this time by impaired sitting balance, activity tolerance, generalized weakness, coordination, speech, and cognition (attention, insight, memory, safety awareness, initiation, sequencing) s/p admission from OSH for higher level of care d/t retroperitoneal bleed. Pt's hospitalization complicated by ARIC, need for RRT, and prolonged intubation (4/9-4/16).     Pt received semi-supine, alert, though minimally interactive with therapists. She required extended time, max multimodal cues, and total A  Modifier : CN     Interpretation of Tool:  Represents clinically-significant functional categories (i.e. Activities of daily living).  Cutoff score 39.4 (19) correlates to a good likelihood of discharging home versus a facility  Rissa Beard, Jazmine Rodriguez, Jude Barnes, Anita Lopez, Yogesh Victor, Robert Beard, AM-PAC “6-Clicks” Functional Assessment Scores Predict Acute Care Hospital Discharge Destination, Physical Therapy, Volume 94, Issue 9, 1 September 2014, Pages 8952-0159, [email or web address]       Pain Rating:  Pt reported increased abdominal pain, repositioned for comfort     Activity Tolerance:   Poor    After treatment:   Patient left in no apparent distress in bed, Call bell within reach, Bed/ chair alarm activated, and Side rails x3    COMMUNICATION/EDUCATION:   The patient's plan of care was discussed with: physical therapist and registered nurse    Patient Education  Education Given To: Patient;Family  Education Provided: Role of Therapy;Plan of Care;Orientation;Precautions  Education Method: Demonstration;Verbal  Barriers to Learning: Cognition  Education Outcome: Unable to demonstrate understanding;Continued education needed    Thank you for this referral.  LAURA Ruiz, OTR/L  Minutes: 31    Occupational Therapy Evaluation Charge Determination   History Examination Decision-Making   LOW Complexity : Brief history review  LOW Complexity: 1-3 Performance deficits relating to physical, cognitive, or psychosocial skills that result in activity limitations and/or participation restrictions MEDIUM Complexity: Patient may present with comorbidities that affect occupational performance. Minimal to moderate modifications of tasks or assist (eg. physical or verbal) with assist is necessary to enable pt to complete eval   Based on the above components, the patient evaluation is determined to be of the following complexity level: Low

## 2024-04-18 NOTE — FLOWSHEET NOTE
Continued need for line? Yes   Site Assessment Clean, dry & intact   CVC Lumen Status Alcohol cap present   Venous Lumen Status Brisk blood return;Flushed   Arterial Lumen Status Sluggish blood return;Flushed   Line Care Connections checked and tightened;Chlorhexidine wipes;Line pulled back;Ports disinfected   Dressing Type Bacteriocidal;Transparent   Date of Last Dressing Change 04/16/24   Dressing Status Clean, dry & intact   Dressing Change Due 04/19/24     Primary RN SBAR: NOE Betancourt RN   Patient Education provided: Yes  Preferred Education method and Primary language: Verbal, English   Hospital associated wait time; reason: 0    Hepatitis B Surface Ag   Date/Time Value Ref Range Status   04/16/2024 12:37 PM <0.10 Index Final     Hep B S Ab   Date/Time Value Ref Range Status   04/17/2024 09:55 AM 4.00 mIU/mL Final      04/18/24 1615   Vital Signs   BP (!) 124/53   Temp 97.4 °F (36.3 °C)   Pulse 64   Respirations 24   SpO2 96 %   Pain Assessment   Pain Assessment 0-10   Pain Level 0   Post-Hemodialysis Assessment   Post-Treatment Procedures Blood returned;Catheter capped, clamped and heparinized x 2 ports   Machine Disinfection Process Acid/Vinegar Clean;Heat Disinfect;Exterior Machine Disinfection   Rinseback Volume (ml) 300 ml   Blood Volume Processed (Liters) 0 L   Dialyzer Clearance Lightly streaked   Duration of Treatment (minutes) 150 minutes   Heparin Amount Administered During Treatment (mL) 2.5 mL  (1.1+1.5)   Hemodialysis Intake (ml) 500 ml   Hemodialysis Output (ml) 3300 ml   NET Removed (ml) 2800   Tolerated Treatment Fair   Interventions Taken Head of bed lowered;Ultrafiltration goal decreased  (UF goal decreased to support BP. Pt respositioned for optimal AP and ,)   Bilateral Breath Sounds Diminished   Physician Notified Yes  (UF goal 2.8L and run time 2.5H)   Time Off 1607   Patient Disposition Remain in ICU/ED   Observations & Evaluations   Level of Consciousness 0   Heart Rhythm Regular    Respiratory Quality/Effort Unlabored   O2 Device Nasal cannula  (2L)   Skin Color Pink;Ecchymosis (comment)   Skin Condition/Temp Dry;Fragile;Warm   Abdomen Inspection Soft;Rounded     Hemodialysis Central Access Right Neck   Placement Date/Time: 04/12/24 1210   Present on Admission/Arrival: No  Inserted by: Carlos Eduardo CEJA  Insertion Practices: Chlorohexadine skin antisepsis;Hand hygiene;Maximal barrier precautions;Optimal catheter site selection;Sterile ultrasound technique...   Continued need for line? Yes   Site Assessment Clean, dry & intact   CVC Lumen Status Heparin locked;Alcohol cap applied   Venous Lumen Status Brisk blood return;Heparin locked   Arterial Lumen Status Sluggish blood return;Heparin locked   Alcohol Cap Used Yes   Line Care Cap changed;Connections checked and tightened;Chlorhexidine wipes;Line pulled back;Ports disinfected   Dressing Type Bacteriocidal;Transparent   Date of Last Dressing Change 04/16/24   Dressing Change Due 04/19/24     Primary RN SBAR: FELIX Amato RN     Comments: Pt tolerated treatment fair. Cath positional. 2.8L removed and run time 2.5H. MD aware. Pt left resting in bed with call light in reach.

## 2024-04-18 NOTE — CARE COORDINATION
Transition of Care Plan:    RUR: 26% High   Prior Level of Functioning: Independent   Disposition: SNF   If SNF or IPR: Date FOC offered: 4/18/24  Date FOC received: 4/18/24  Accepting facility: [facility]   DME needed: TBD  Transportation at discharge: BLS  IM/IMM Medicare letter given: No  Is patient a [place] and connected with [facility]? No  Caregiver Contact: Daughter Hyacinth Schaeffer [phone]  Discharge Caregiver contacted prior to discharge? Yes  Care Conference needed? No  Barriers to discharge:    Medical stability   [facility], [facility] facility [facility] does have a dialysis Den. Alexa (Liaison) with [place] will continue to follow. CM met with daughter Hyacinth Schaeffer and she is agreeable for patient to be discharged to [facility].   Alexa will email this CM with labs etc needed for dialysis.   Nel Gonzales RN,Care Management

## 2024-04-18 NOTE — PROGRESS NOTES
No acute distress,  S1, S2, RRR  Minimal ext edema   Abdomen soft nontender non distended  +IRMA, crackles bilaterally.  Moving all extremities no gross focal deficits, awake alert oriented x 3.    Labs and Data: Reviewed 04/18/24  Recent Labs     04/16/24 0342 04/17/24 0514 04/18/24  0518   WBC 7.2 8.4 9.3   RBC 2.45* 2.61* 2.55*   HGB 7.7* 8.2* 8.0*   HCT 23.2* 24.9* 24.4*   MCV 94.7 95.4 95.7   RDW 15.8* 15.9* 15.9*   PLT 56* 70* 95*       Recent Labs     04/16/24 0342 04/17/24 0514 04/18/24  0518    139 139   K 3.6 3.8 3.6    104 104   CO2 24 24 26   BUN 23* 53* 41*   CREATININE 1.73* 3.01* 2.82*   GLUCOSE 108* 122* 96   PHOS 2.2* 4.9* 3.7   MG 2.3 2.6* 2.3       Recent Labs     04/16/24 0342 04/17/24 0514 04/18/24  0518   AST 97* 73* 52*   * 88* 42   BILITOT 1.1* 1.0 1.0   ALKPHOS 80 87 85       No results for input(s): \"PROTIME\", \"INR\" in the last 72 hours.    Medications: Reviewed 04/18/24  Imaging: Reviewed 04/18/24   Most Recent XR  XR CHEST PORTABLE 04/18/2024    Narrative  EXAM:  XR CHEST PORTABLE    INDICATION: intubated    COMPARISON: 4/17/2024    TECHNIQUE: 0441 hours portable chest AP view    FINDINGS: No ET tube is identified. Right IJ catheter overlies the cavoatrial  junction. There is mild cardiomegaly. Lungs demonstrate pulmonary vascular  congestion and pulmonary edema. The edema pattern has increased. Small pleural  effusions are present. Mild left basilar atelectasis is unchanged.    Impression  1. Increase in pulmonary edema pattern.    Most Recent CT  CTA ABDOMEN AND PELVIS W CONTRAST 04/09/2024    Narrative  EXAM: CTA ABDOMEN PELVIS W CONTRAST    INDICATION: Bleeding    COMPARISON: None.    IV CONTRAST: 100 mL of Isovue-370.    ORAL CONTRAST: None    TECHNIQUE: Helical CT abdomen/pelvis before and following intravenous contrast  administration. Post contrast imaging in the arterial and delayed venous phases.  Contiguous axial images were reconstructed in lung and  2024  6:33 PM (Final)    Interpretation Summary    Right Ventricle: Not well visualized. Visually normal systolic function.    Aortic Valve: Trileaflet valve. Moderate sclerosis of the aortic valve cusp.    Mitral Valve: Moderate annular calcification of the mitral valve. Trace regurgitation.    Left Ventricle: Hyperdynamic left ventricular systolic function (on vasopressors). Left ventricle size is normal. Mildly increased wall thickness. Normal wall motion. Abnormal diastolic function. MV E/e' lateral velocity is 12.75 .    Left Ventricle: Hyperdynamic left ventricular systolic function with a visually estimated EF of 65 - 70%. Left ventricle size is normal. Mildly increased wall thickness.    Tricuspid Valve: Trace regurgitation. The estimated RVS/PAS pressure is 32 mmHg.    Pericardium: No pericardial effusion.    Image quality is technically difficult. Limited Doppler study due to patient's condition and procedure performed with the patient in a supine position.  On ventilator support    Signed by: Brian Shukla MD on 2024  6:33 PM      BP (!) 100/51   Pulse 65   Temp 97.8 °F (36.6 °C)   Resp 22   Ht 1.575 m (5' 2.01\")   Wt 69.4 kg (153 lb)   SpO2 97%   BMI 27.98 kg/m²      Temp (24hrs), Av.1 °F (36.7 °C), Min:97.8 °F (36.6 °C), Max:98.2 °F (36.8 °C)           Intake/Output:     Intake/Output Summary (Last 24 hours) at 2024 1544  Last data filed at 2024 0600  Gross per 24 hour   Intake 594.36 ml   Output 2200 ml   Net -1605.64 ml           CRITICAL CARE DOCUMENTATION  I had a face to face encounter with the patient, reviewed and interpreted patient data including clinical events, labs, images, vital signs, I/O's, and examined patient.  I have discussed the case and the plan and management of the patient's care with the consulting services, the bedside nurses and the respiratory therapist.      NOTE OF PERSONAL INVOLVEMENT IN CARE   This patient has a high probability of imminent,

## 2024-04-18 NOTE — PLAN OF CARE
Problem: Physical Therapy - Adult  Goal: By Discharge: Performs mobility at highest level of function for planned discharge setting.  See evaluation for individualized goals.  Description: FUNCTIONAL STATUS PRIOR TO ADMISSION: Patient was modified independent using a single point cane for functional mobility. Pt's daughter reported pt is fairly sedentary    HOME SUPPORT PRIOR TO ADMISSION: The patient lived with family.    Physical Therapy Goals  Initiated 4/18/2024  1.  Patient will move from supine to sit and sit to supine and roll side to side in bed with maximal assistance within 7 day(s).    2.  Patient will perform sit to stand with maximal assistance within 7 day(s).  3.  Patient will transfer from bed to chair and chair to bed with maximal assistance using the least restrictive device within 7 day(s).  4.  Patient will ambulate with maximal assistance for 5 feet with the least restrictive device within 7 day(s).   5.  Patient will tolerate seated EOB x 10 minutes with mod A within 7 days    Outcome: Progressing   PHYSICAL THERAPY EVALUATION    Patient: Rozina Doss (78 y.o. female)  Date: 4/18/2024  Primary Diagnosis: Hemorrhagic shock (HCC) [R57.8]       Precautions: Restrictions/Precautions: Fall Risk                      ASSESSMENT :   DEFICITS/IMPAIRMENTS:   The patient is limited by decreased functional mobility, ROM, strength, activity tolerance, cognition, command following, attention/concentration, balance, increased pain levels s/p admission from OSH for higher level of care d/t retroperitoneal bleed. Pt's hospitalization complicated by ARIC, need for RRT, and prolonged intubation (4/9-4/16). Pt now on 3L of oxygen. Pt lethargic, and oriented x 2 (person and time only).    Based on the impairments listed above pt required total A x 2 rolling L and R, and repositioning in the bed. Pt with poor trunk control with bed placed in chair position and required pillows placed on each side of her

## 2024-04-18 NOTE — FLOWSHEET NOTE
0730 Received report from JUDD Fraire. Patient stayed on NC overnight, did not require high flow or bipap. Patient had 3 Bms overnight.    0800 Assessment completed and documented. MD at bedside with plan to transfer patient out of ICU today.     0830 MD Ewing at bedside with plan to do Ultrafiltration today. Notified ICU MD with plan to keep patient in ICU until after ultrafiltration.     0845 Patient with incontinent loose stool, Patient cleaned and moisture barrier applied to bottom and groin areas.     0901 MD Ewing at bedside. Decision was made to do another round of Ultrafiltration. Orders placed for Ultrafiltration.     0920 Patient had long pause on monitor. Went to bedside, patients heart rate in the 30's. Patient had vomited in mouth and was suctioning herself when this happened. Patient's O2 sats were in the low 90's after. Patients back to NSR. Zofran given and breakfast held at this time.     1346 Dialyssi nurse at bedside. Ultrafiltration initiated.     1420 Patient desatted into the 80's, Patient was told to cough. Oral suction performed. Increased NC to 6L.     1445 Changed O2 sat probe. O2 sats 97%. Decreased NC to 2L.     1800 Patient transferred to NSTU. Report given to JUDD Mcdonough.

## 2024-04-18 NOTE — PLAN OF CARE
Speech LAnguage Pathology TREATMENT    Patient: Rozina Doss (78 y.o. female)  Date: 4/18/2024  Primary Diagnosis: Hemorrhagic shock (HCC) [R57.8]       Precautions:  Fall Risk                  ASSESSMENT :  Conducted caregiver education on this date. Spent some time discussing the results of the FEES, physiologic implications for requiring diet consistency modifications and further discussed the swallow mechanism and the likely etiology for pt's dysphagia. Patient's daughter at the bedside asking appropriate questions and requesting ways that she can support the patient. Explained that the patient eating/drinking would be an important step, as swallowing deficits are likely related to the prolonged intubation and will hopefully improve as intubation becomes more remote. Pt's daughter expressed understanding. PO trials deferred today as pt had vomiting this morning with an episode of desaturation associated with this. Will continue to follow.     Patient will benefit from skilled intervention to address the above impairments.     PLAN :  Recommendations and Planned Interventions:  Diet: Puree and moderately thick liquids  General aspiration precautions  Good oral care  Upright during meals  Meds in applesauce whole     Recommend next SLP session: Diet check; work towards advancement?    Acute SLP Services: Yes, SLP will continue to follow per plan of care.    Discharge Recommendations: Continue to assess pending progress     SUBJECTIVE:   Patient stated, “Bye,”  Pt sleeping throughout session, but did wake up to say goodbye to SLP.    OBJECTIVE:     Past Medical History:   Diagnosis Date    Diabetes (HCC)     Hypertension     Tobacco use 4/8/2023    Type 2 diabetes mellitus (HCC) 4/8/2023     Past Surgical History:   Procedure Laterality Date    IR VASC EMBOLIZE OCCLUDE ARTERY  4/9/2024    IR VASC EMBOLIZE OCCLUDE ARTERY 4/9/2024 H RAD ANGIO IR     Prior Level of Function/Home Situation:

## 2024-04-19 ENCOUNTER — APPOINTMENT (OUTPATIENT)
Facility: HOSPITAL | Age: 79
DRG: 356 | End: 2024-04-19
Payer: MEDICARE

## 2024-04-19 ENCOUNTER — APPOINTMENT (OUTPATIENT)
Facility: HOSPITAL | Age: 79
DRG: 356 | End: 2024-04-19
Attending: INTERNAL MEDICINE
Payer: MEDICARE

## 2024-04-19 LAB
ALBUMIN SERPL-MCNC: 2.8 G/DL (ref 3.5–5)
ALBUMIN/GLOB SERPL: 0.7 (ref 1.1–2.2)
ALP SERPL-CCNC: 93 U/L (ref 45–117)
ALT SERPL-CCNC: 36 U/L (ref 12–78)
ANION GAP SERPL CALC-SCNC: 12 MMOL/L (ref 5–15)
AST SERPL-CCNC: 66 U/L (ref 15–37)
BASOPHILS # BLD: 0.1 K/UL (ref 0–0.1)
BASOPHILS NFR BLD: 1 % (ref 0–1)
BILIRUB SERPL-MCNC: 0.8 MG/DL (ref 0.2–1)
BUN SERPL-MCNC: 70 MG/DL (ref 6–20)
BUN/CREAT SERPL: 16 (ref 12–20)
CALCIUM SERPL-MCNC: 9.1 MG/DL (ref 8.5–10.1)
CHLORIDE SERPL-SCNC: 103 MMOL/L (ref 97–108)
CO2 SERPL-SCNC: 24 MMOL/L (ref 21–32)
CREAT SERPL-MCNC: 4.31 MG/DL (ref 0.55–1.02)
DIFFERENTIAL METHOD BLD: ABNORMAL
EOSINOPHIL # BLD: 0 K/UL (ref 0–0.4)
EOSINOPHIL NFR BLD: 0 % (ref 0–7)
ERYTHROCYTE [DISTWIDTH] IN BLOOD BY AUTOMATED COUNT: 16.1 % (ref 11.5–14.5)
GLOBULIN SER CALC-MCNC: 4.3 G/DL (ref 2–4)
GLUCOSE BLD STRIP.AUTO-MCNC: 107 MG/DL (ref 65–117)
GLUCOSE BLD STRIP.AUTO-MCNC: 109 MG/DL (ref 65–117)
GLUCOSE BLD STRIP.AUTO-MCNC: 117 MG/DL (ref 65–117)
GLUCOSE BLD STRIP.AUTO-MCNC: 117 MG/DL (ref 65–117)
GLUCOSE SERPL-MCNC: 109 MG/DL (ref 65–100)
HCT VFR BLD AUTO: 26.8 % (ref 35–47)
HGB BLD-MCNC: 8.8 G/DL (ref 11.5–16)
IMM GRANULOCYTES # BLD AUTO: 0.2 K/UL (ref 0–0.04)
IMM GRANULOCYTES NFR BLD AUTO: 2 % (ref 0–0.5)
LYMPHOCYTES # BLD: 1.2 K/UL (ref 0.8–3.5)
LYMPHOCYTES NFR BLD: 11 % (ref 12–49)
MAGNESIUM SERPL-MCNC: 2.6 MG/DL (ref 1.6–2.4)
MCH RBC QN AUTO: 31.5 PG (ref 26–34)
MCHC RBC AUTO-ENTMCNC: 32.8 G/DL (ref 30–36.5)
MCV RBC AUTO: 96.1 FL (ref 80–99)
MONOCYTES # BLD: 0.6 K/UL (ref 0–1)
MONOCYTES NFR BLD: 5 % (ref 5–13)
NEUTS SEG # BLD: 9.6 K/UL (ref 1.8–8)
NEUTS SEG NFR BLD: 81 % (ref 32–75)
NRBC # BLD: 0 K/UL (ref 0–0.01)
NRBC BLD-RTO: 0 PER 100 WBC
PHOSPHATE SERPL-MCNC: 4.8 MG/DL (ref 2.6–4.7)
PLATELET # BLD AUTO: 152 K/UL (ref 150–400)
PMV BLD AUTO: 11 FL (ref 8.9–12.9)
POTASSIUM SERPL-SCNC: 4.6 MMOL/L (ref 3.5–5.1)
PROT SERPL-MCNC: 7.1 G/DL (ref 6.4–8.2)
RBC # BLD AUTO: 2.79 M/UL (ref 3.8–5.2)
SERVICE CMNT-IMP: NORMAL
SODIUM SERPL-SCNC: 139 MMOL/L (ref 136–145)
WBC # BLD AUTO: 11.7 K/UL (ref 3.6–11)

## 2024-04-19 PROCEDURE — 02H633Z INSERTION OF INFUSION DEVICE INTO RIGHT ATRIUM, PERCUTANEOUS APPROACH: ICD-10-PCS | Performed by: STUDENT IN AN ORGANIZED HEALTH CARE EDUCATION/TRAINING PROGRAM

## 2024-04-19 PROCEDURE — 85025 COMPLETE CBC W/AUTO DIFF WBC: CPT

## 2024-04-19 PROCEDURE — 2580000003 HC RX 258: Performed by: NURSE PRACTITIONER

## 2024-04-19 PROCEDURE — 36415 COLL VENOUS BLD VENIPUNCTURE: CPT

## 2024-04-19 PROCEDURE — 2709999900 IR INSERT TUNNELED CV CATH WO SQ PORT/PUMP < 5YRS

## 2024-04-19 PROCEDURE — P9047 ALBUMIN (HUMAN), 25%, 50ML: HCPCS | Performed by: INTERNAL MEDICINE

## 2024-04-19 PROCEDURE — 2500000003 HC RX 250 WO HCPCS: Performed by: PHYSICIAN ASSISTANT

## 2024-04-19 PROCEDURE — 83735 ASSAY OF MAGNESIUM: CPT

## 2024-04-19 PROCEDURE — 6360000002 HC RX W HCPCS: Performed by: PHYSICIAN ASSISTANT

## 2024-04-19 PROCEDURE — 6360000002 HC RX W HCPCS: Performed by: INTERNAL MEDICINE

## 2024-04-19 PROCEDURE — 71045 X-RAY EXAM CHEST 1 VIEW: CPT

## 2024-04-19 PROCEDURE — 0JH63XZ INSERTION OF TUNNELED VASCULAR ACCESS DEVICE INTO CHEST SUBCUTANEOUS TISSUE AND FASCIA, PERCUTANEOUS APPROACH: ICD-10-PCS | Performed by: STUDENT IN AN ORGANIZED HEALTH CARE EDUCATION/TRAINING PROGRAM

## 2024-04-19 PROCEDURE — 6370000000 HC RX 637 (ALT 250 FOR IP): Performed by: INTERNAL MEDICINE

## 2024-04-19 PROCEDURE — 82962 GLUCOSE BLOOD TEST: CPT

## 2024-04-19 PROCEDURE — 80053 COMPREHEN METABOLIC PANEL: CPT

## 2024-04-19 PROCEDURE — 2060000000 HC ICU INTERMEDIATE R&B

## 2024-04-19 PROCEDURE — 77001 FLUOROGUIDE FOR VEIN DEVICE: CPT

## 2024-04-19 PROCEDURE — 90935 HEMODIALYSIS ONE EVALUATION: CPT

## 2024-04-19 PROCEDURE — 84100 ASSAY OF PHOSPHORUS: CPT

## 2024-04-19 RX ORDER — HEPARIN SODIUM 1000 [USP'U]/ML
INJECTION, SOLUTION INTRAVENOUS; SUBCUTANEOUS PRN
Status: COMPLETED | OUTPATIENT
Start: 2024-04-19 | End: 2024-04-19

## 2024-04-19 RX ORDER — NIFEDIPINE 30 MG/1
30 TABLET, EXTENDED RELEASE ORAL DAILY
Status: DISCONTINUED | OUTPATIENT
Start: 2024-04-19 | End: 2024-04-19

## 2024-04-19 RX ORDER — CASTOR OIL AND BALSAM, PERU 788; 87 MG/G; MG/G
OINTMENT TOPICAL 3 TIMES DAILY
Status: DISCONTINUED | OUTPATIENT
Start: 2024-04-19 | End: 2024-04-23 | Stop reason: HOSPADM

## 2024-04-19 RX ORDER — LIDOCAINE HYDROCHLORIDE 10 MG/ML
INJECTION, SOLUTION EPIDURAL; INFILTRATION; INTRACAUDAL; PERINEURAL PRN
Status: COMPLETED | OUTPATIENT
Start: 2024-04-19 | End: 2024-04-19

## 2024-04-19 RX ORDER — NIFEDIPINE 30 MG/1
30 TABLET, EXTENDED RELEASE ORAL DAILY
Status: DISCONTINUED | OUTPATIENT
Start: 2024-04-20 | End: 2024-04-19

## 2024-04-19 RX ORDER — ATORVASTATIN CALCIUM 40 MG/1
40 TABLET, FILM COATED ORAL DAILY
Status: DISCONTINUED | OUTPATIENT
Start: 2024-04-19 | End: 2024-04-23 | Stop reason: HOSPADM

## 2024-04-19 RX ORDER — FUROSEMIDE 20 MG/1
20 TABLET ORAL DAILY
Status: DISCONTINUED | OUTPATIENT
Start: 2024-04-19 | End: 2024-04-23 | Stop reason: HOSPADM

## 2024-04-19 RX ORDER — LANOLIN ALCOHOL/MO/W.PET/CERES
3 CREAM (GRAM) TOPICAL NIGHTLY PRN
Status: DISCONTINUED | OUTPATIENT
Start: 2024-04-19 | End: 2024-04-23 | Stop reason: HOSPADM

## 2024-04-19 RX ORDER — NIFEDIPINE 30 MG/1
30 TABLET, EXTENDED RELEASE ORAL DAILY
Status: DISCONTINUED | OUTPATIENT
Start: 2024-04-19 | End: 2024-04-23 | Stop reason: HOSPADM

## 2024-04-19 RX ORDER — LABETALOL 100 MG/1
200 TABLET, FILM COATED ORAL 2 TIMES DAILY
Status: DISCONTINUED | OUTPATIENT
Start: 2024-04-19 | End: 2024-04-19

## 2024-04-19 RX ORDER — LOSARTAN POTASSIUM 50 MG/1
25 TABLET ORAL DAILY
Status: DISCONTINUED | OUTPATIENT
Start: 2024-04-19 | End: 2024-04-22

## 2024-04-19 RX ORDER — INSULIN GLARGINE 100 [IU]/ML
12 INJECTION, SOLUTION SUBCUTANEOUS DAILY
Status: DISCONTINUED | OUTPATIENT
Start: 2024-04-19 | End: 2024-04-23 | Stop reason: HOSPADM

## 2024-04-19 RX ORDER — LABETALOL 100 MG/1
100 TABLET, FILM COATED ORAL 2 TIMES DAILY
Status: DISCONTINUED | OUTPATIENT
Start: 2024-04-19 | End: 2024-04-20

## 2024-04-19 RX ADMIN — Medication: at 21:50

## 2024-04-19 RX ADMIN — HEPARIN SODIUM 5000 UNITS: 5000 INJECTION INTRAVENOUS; SUBCUTANEOUS at 05:48

## 2024-04-19 RX ADMIN — Medication: at 14:06

## 2024-04-19 RX ADMIN — HEPARIN SODIUM 3800 UNITS: 1000 INJECTION INTRAVENOUS; SUBCUTANEOUS at 12:22

## 2024-04-19 RX ADMIN — ALBUMIN (HUMAN) 25 G: 0.25 INJECTION, SOLUTION INTRAVENOUS at 17:32

## 2024-04-19 RX ADMIN — AMLODIPINE BESYLATE 10 MG: 5 TABLET ORAL at 13:44

## 2024-04-19 RX ADMIN — ATENOLOL 50 MG: 50 TABLET ORAL at 13:45

## 2024-04-19 RX ADMIN — SODIUM CHLORIDE, PRESERVATIVE FREE 5 ML: 5 INJECTION INTRAVENOUS at 17:36

## 2024-04-19 RX ADMIN — LIDOCAINE HYDROCHLORIDE 10 ML: 10 INJECTION, SOLUTION EPIDURAL; INFILTRATION; INTRACAUDAL; PERINEURAL at 12:17

## 2024-04-19 RX ADMIN — SODIUM CHLORIDE, PRESERVATIVE FREE 10 ML: 5 INJECTION INTRAVENOUS at 21:45

## 2024-04-19 ASSESSMENT — PAIN SCALES - GENERAL
PAINLEVEL_OUTOF10: 0

## 2024-04-19 NOTE — FLOWSHEET NOTE
04/19/24 1604   Vital Signs   /63   Temp 98 °F (36.7 °C)   Pulse 70   Respirations 20   Pain Assessment   Pain Assessment None - Denies Pain   Pain Level 0   Treatment   Time On 1635   Treatment Goal 2 Liters   Observations & Evaluations   Level of Consciousness 0   Heart Rhythm Regular   Respiratory Quality/Effort Unlabored   O2 Device None (Room air)   Bilateral Breath Sounds Other (Comment)  (non productive cough)   Skin Condition/Temp Warm   Abdomen Inspection Soft   Edema None   Technical Checks   Dialysis Machine No. 05   RO Machine Number [id number]   Dialyzer Lot No. C   All Connections Secure Yes   NS Bag Yes   Saline Line Double Clamped Yes   Dialyzer Revaclear 300   Prime Volume (mL) 200 mL   ICEBOAT I;C;E;B;O;A;T   RO Machine Log Sheet Completed Yes   Machine Alarm Self Test Completed;Passed   Air Foam Detector Tested;Proper Function   Extracorporeal Circuit Tested for Integrity Yes   Machine Conductivity 13.9   Manual Ph 7.4   Bleach Test (Neg) Yes   Dialysis Bath   K+ (Potassium) 2   Ca+ (Calcium) 2.5   Na+ (Sodium) 138   HCO3 (Bicarb) 35   Treatment Initiation   Dialyze Hours 3   Treatment  Initiation Universal Precautions maintained;Lines secured to patient;Connections secured;Prime given;Venous Parameters set;Arterial Parameters set;Air foam detector engaged;Saline line double clamped;Revaclear Dialyzer   [REMOVED] Hemodialysis Central Access Right Neck   Removal Date/Time: 04/19/24 1221  Placement Date/Time: 04/12/24 1210   Present on Admission/Arrival: No  Inserted by: Carlos Eduardo CEJA  Insertion Practices: Chlorohexadine skin antisepsis;Hand hygiene;Maximal barrier precautions;Optimal catheter site florentino...   Site Assessment   (old blood noted around exit site as catheter was placed today)   Alcohol Cap Used No   Hemodialysis Central Access Right Neck   Placement Date/Time: 04/19/24 1221   Inserted by: MARK Park  Insertion Practices: Chlorohexadine skin antisepsis;Optimal catheter site

## 2024-04-19 NOTE — CARE COORDINATION
Transition of Care Plan:    SNF - [facility] - will require insurance auth through [employer] to be started 4/22   - Hep B total core lab needed    ([facility] selected on chart, however Pt will be going to [facility])    [facility]: [address], [place] [address]  Liaison: Yokasta Gill Ph. [phone]; Fx. [phone]; email [email or web address] (formerly known as [email or web address])     [facility] referral sent 4/19  [facility] requested 4/19    RUR: 26% High   Prior Level of Functioning: Independent   Disposition: SNF   If SNF or IPR: Date FOC offered: 4/18/24  Date FOC received: 4/18/24  Accepting facility: Pending with [facility] and rehab   DME needed: TBD  Transportation at discharge: BLS  IM/[facility] Medicare letter given: No  Is patient a Hall and connected with [facility]? No  Caregiver Contact: Daughter Hyacinth Schaeffer [phone]  Discharge Caregiver contacted prior to discharge? Yes  Care Conference needed? No  Barriers to discharge:  Medical - needs permacath for new HD; Hep B Total core lab needed; Auth - needed through [employer]     Pt case discussed in IDRs, plan for Pt DC Monday or Tuesday. Message left for Kristi Mendoza, liaison with [facility], regarding anticipated DC date.     Hep B total core lab needed for Pt to be set up with HD Den at [facility]. Additional labs and all requested clinicals have been emailed to Kristi Mendoza.     10:30am: CM received call from Yokasta Gill, Liaison with [facility] - she will now be liaison for Pt. In addition, they are requesting clarification re: Pt's long term plan after SNF. Pt has not yet been accepted by HD Den within the [facility], therefore, has not been accepted by [facility]. They have requested CM hold off on auth until Total Core lab is resulted and sent in for approval.    11:00am: CM met with Pt's rolando Claros at bedside to discuss long term plan following SNF rehab. Per Hyacinth - hopeful to get pt home with family (son, granddaughter) if she improves. CM asked if Pt had been  screened for Medicaid - they would like for Pt to be screened for LTC Medicaid. CM sent referral to [facility] for screening, [facility] requested as well. ARIEL relayed all information to Yokasta Gaston.     3:30pm: Pt approved for HD Den at [facility].     PATRICIA Guillory (Ally).

## 2024-04-19 NOTE — PROGRESS NOTES
[facility]                                                                                          Hospitalist Progress Note  Sushma Madala, MD  Office Phone: [phone]        Date of Service:  2024  NAME:  Rozina Doss  :  1945  MRN:  [id number]       Admission Summary:   Rozina Doss is a 78 y.o. female who was initially admitted to [facility] on .  Patient initially presented with cough, malaise and weakness.  Subsequent developed flash pulmonary edema and required BiPAP.  Patient was admitted to ICU at [facility].  On , patient became significantly hypotensive, hypothermic and workup shows that hemoglobin has significantly dropped from 7.4-3.9.  CT angiogram of the abdomen and pelvis to enlarging right retroperitoneal hematoma with active bleeding.  Subsequently patient was intubated at the outside hospital, started on pressors, also began transfusion of 3 units PRBCs and 2 units FFP.  Patient subsequently transferred to [facility] for IR evaluation.  Patient was evaluated by IR upon arrival at [facility] and went for arteriogram which shows multiple right lumbar/retroperitoneal arteries showing active bleeding and subsequently embolization was performed.  Patient was intubated for the procedure and subsequently admitted to ICU.  Patient was able to be extubated on  and transition to BiPAP.  Patient also developed ARIC due to ATN from hemorrhagic shock and started on CRRT this admission and transition to HD MWF.  Nephrology following.  Patient has been seen by speech therapy and started on diet.  Patient transferred out of ICU today to assume care under the hospitalist service.        Interval history / Subjective:   Patient is seen and examined at bedside this AM. Daughter present. Pt feeling weak. Encouraged to eat better   Long discussion with daughter - overall slowly improving,  all available resources at the time of admission. Route is PO if not otherwise noted.      Admission Status:[id number]:::1}      Medications Reviewed:     Current Facility-Administered Medications   Medication Dose Route Frequency    balsum peru-castor oil (VENELEX) ointment   Topical TID    albuterol (PROVENTIL) (2.5 MG/3ML) 0.083% nebulizer solution 2.5 mg  2.5 mg Nebulization Q6H PRN    labetalol (NORMODYNE;TRANDATE) injection 20 mg  20 mg IntraVENous Q6H PRN    atenolol (TENORMIN) tablet 50 mg  50 mg Oral Daily    hydrALAZINE (APRESOLINE) injection 10 mg  10 mg IntraVENous Q6H PRN    heparin (porcine) injection 5,000 Units  5,000 Units SubCUTAneous 3 times per day    amLODIPine (NORVASC) tablet 10 mg  10 mg Orogastric Daily    albumin human 25% IV solution 25 g  25 g IntraVENous PRN    oxyCODONE (ROXICODONE) immediate release tablet 5 mg  5 mg Oral Q4H PRN    Or    oxyCODONE (ROXICODONE) immediate release tablet 10 mg  10 mg Oral Q4H PRN    heparin (porcine) 1000 UNIT/ML injection 1,400 Units  1,400 Units IntraCATHeter PRN    And    heparin (porcine) 1000 UNIT/ML injection 1,100 Units  1,100 Units IntraCATHeter PRN    glucose chewable tablet 16 g  4 tablet Oral PRN    dextrose bolus 10% 125 mL  125 mL IntraVENous PRN    Or    dextrose bolus 10% 250 mL  250 mL IntraVENous PRN    glucagon injection 1 mg  1 mg SubCUTAneous PRN    dextrose 10 % infusion   IntraVENous Continuous PRN    insulin lispro (HUMALOG) injection vial 0-8 Units  0-8 Units SubCUTAneous Q6H    0.9 % sodium chloride infusion   IntraVENous PRN    sodium chloride flush 0.9 % injection 5-40 mL  5-40 mL IntraVENous 2 times per day    sodium chloride flush 0.9 % injection 5-40 mL  5-40 mL IntraVENous PRN    0.9 % sodium chloride infusion   IntraVENous PRN    ondansetron (ZOFRAN-ODT) disintegrating tablet 4 mg  4 mg Oral Q8H PRN    Or    ondansetron (ZOFRAN) injection 4 mg  4 mg IntraVENous Q6H PRN    polyethylene glycol (GLYCOLAX) packet 17 g  17 g Oral

## 2024-04-19 NOTE — PROGRESS NOTES
[address], [facility] [place] [address]  Phone: [phone]   [phone]    [email or web address]     Nephrology Progress Note    Patient Name : Rozina Doss      : 1945     MRN : [id number]  Date of Admission : 2024  Date of Servive : 24    CC:  Follow up for ARIC on CKD       Assessment and Plan   ARIC on CKD IV:  - 2/2 ATN from hemorrhagic shock  - HD MWF while hospitalized  -  PC placement  - Patient will need outpatient HD set prior to discharge, plans for HD Den within [facility].    CKD IV:  - baseline Cr around 2.5  - f/b [facility]     Hyperkalemia/acidosis:  - resolved     Hemorrhagic shock:  - Hgb stable  - transfused if hgb < 7       Shock liver:  - trend LFTs    RP bleed s/p coil ebmolization      DM2  Obesity  HTN     Interval History:  Seen and examined.  UF yesterday with 2.8L- prabhakar positional. Patient denies SOB, continues with cough. Due for dialysis today.     Review of Systems: Pertinent items are noted in HPI.    Current Medications:   Current Facility-Administered Medications   Medication Dose Route Frequency    albuterol (PROVENTIL) (2.5 MG/3ML) 0.083% nebulizer solution 2.5 mg  2.5 mg Nebulization Q6H PRN    labetalol (NORMODYNE;TRANDATE) injection 20 mg  20 mg IntraVENous Q6H PRN    atenolol (TENORMIN) tablet 50 mg  50 mg Oral Daily    hydrALAZINE (APRESOLINE) injection 10 mg  10 mg IntraVENous Q6H PRN    heparin (porcine) injection 5,000 Units  5,000 Units SubCUTAneous 3 times per day    amLODIPine (NORVASC) tablet 10 mg  10 mg Orogastric Daily    albumin human 25% IV solution 25 g  25 g IntraVENous PRN    oxyCODONE (ROXICODONE) immediate release tablet 5 mg  5 mg Oral Q4H PRN    Or    oxyCODONE (ROXICODONE) immediate release tablet 10 mg  10 mg Oral Q4H PRN    heparin (porcine) 1000 UNIT/ML injection 1,400 Units  1,400 Units IntraCATHeter PRN    And    heparin (porcine) 1000  Personally Reviewed: I have reviewed all the pertinent labs, microbiology data and radiology studies during assessment.    Labs:  Recent Labs     04/17/24  0514 04/18/24 0518 04/19/24  0544    139 139   K 3.8 3.6 4.6    104 103   CO2 24 26 24   GLUCOSE 122* 96 109*   BUN 53* 41* 70*   CREATININE 3.01* 2.82* 4.31*   CALCIUM 8.7 8.9 9.1         Recent Labs     04/17/24 0514 04/18/24 0518 04/19/24  0544   WBC 8.4 9.3 11.7*   RBC 2.61* 2.55* 2.79*   HGB 8.2* 8.0* 8.8*   HCT 24.9* 24.4* 26.8*   MCV 95.4 95.7 96.1   MCH 31.4 31.4 31.5   MCHC 32.9 32.8 32.8   RDW 15.9* 15.9* 16.1*   PLT 70* 95* 152   MPV 10.9 11.5 11.0       Recent Labs     04/17/24 0514 04/18/24 0518 04/19/24  0544   GLOB 2.9 3.5 4.3*       No results for input(s): \"INR\", \"APTT\" in the last 72 hours.    Invalid input(s): \"PTP\"     No results for input(s): \"CPK\", \"CKMB\", \"TROPONINI\" in the last 72 hours.    Invalid input(s): \"B-NP\"  Invalid input(s): \"PHI\", \"PCO2I\", \"PO2I\", \"FIO2I\"     Ventilator:       Microbiology:  No results found for: \"SDES\"  No components found for: \"CULT\"      I have reviewed the flowsheets.  Chart and Pertinent Notes have been reviewed.   No change in PMH ,family and social history from Consult note.      aTta Arreaga, APRN - NP  [facility]

## 2024-04-19 NOTE — PROGRESS NOTES
Physical Therapy Note  04/19/2024    Chart reviewed in prep for PT tx session; attempted to see however currently off unit for permacath placement. Will defer and follow back up as able/appropriate.    Thank you,  Gabi Mari, PT, DPT

## 2024-04-19 NOTE — PROGRESS NOTES
NUTRITION brief    Recommendations:   Diet per SLP recommendations.  Add Gelatein, pudding, smooth yogurts to all meal trays.   Nepro ONS when/if able to transition to thin liquids.      Diet: Currently NPO  Nutrition Support: Discontinued 4/16  Nutrition-related meds: Reviewed    Pt extubated 4/16, DHT removed. S/p FEES on 4/17 recommending Pureed diet with moderately thick liquids. Now on NSTU. Spoke with family as pt going off the floor. So far pt has limited PO intake. Discussed addition of Gelatein, puddings, and smooth yogurts for additional kcal/protein options. Family asked about Ensure, discussed would need to be thickened to appropriate consistency and therefore offered the above until potential ability to upgrade.     Meal Intake:   No data found.  Supplement Intake:  No data found.    Recent Labs     04/17/24  0514 04/18/24  0518 04/19/24  0544   BUN 53* 41* 70*    139 139   K 3.8 3.6 4.6    104 103   CO2 24 26 24   PHOS 4.9* 3.7 4.8*   MG 2.6* 2.3 2.6*         See full RD assessment from 4/15 for additional details, goals, and monitoring/evaluation.   Estimated Nutrition Needs:      Weight Used for Energy Requirements: Admission (75 kg)  Energy (kcal/day): 1530 ([employer] 2010)  Weight Used for Protein Requirements: Ideal  Protein (g/day):  (1.5-2.0g/kg IBW)  Method Used for Fluid Requirements: Standard Renal  Fluid (ml/day):      Chelo Jacobsen RD   Contact via [employer] or ext [phone]

## 2024-04-19 NOTE — PROGRESS NOTES
Speech pathology  Chart reviewed, attempted to see; however, patient MAYE for permacath placement. She was NPO for this procedure.   Recommend resuming puree diet/ moderately thick liquids once cleared for PO. This is the diet recommended s/p FEES on 4/17/24.  Plan for HD later today as well.   Slp will continue to follow up for dysphagia treatment.     Clotilde Foley M.S. DEXTER-SLP

## 2024-04-19 NOTE — PROGRESS NOTES
TRANSFER - OUT REPORT:    Verbal report given to RN on Rozina Doss  being transferred to NSTU for routine post-op       Report consisted of patient's Situation, Background, Assessment and   Recommendations(SBAR).     Information from the following report(s) Nurse Handoff Report was reviewed with the receiving nurse.           Lines:   Extended Dwell Peripherial IV 04/09/24 Right Brachial (Active)   Criteria for Appropriate Use Hemodynamically unstable, requiring monitoring lines, vasopressors, or volume resuscitation 04/19/24 0400   Site Assessment Clean, dry & intact 04/19/24 0400   Phlebitis Assessment No symptoms 04/19/24 0400   Infiltration Assessment 0 04/19/24 0400   Line Status Capped;Normal saline locked;Flushed 04/19/24 0400   Line Care Connections checked and tightened 04/19/24 0400   Alcohol Cap Used Yes 04/19/24 0400   Date of Last Dressing Change 04/16/24 04/19/24 0400   Dressing Type Transparent w/CHG gel 04/19/24 0400   Dressing Status Clean, dry & intact 04/19/24 0400   Dressing Intervention New;Dressing changed 04/16/24 1559       Extended Dwell Peripherial IV 04/09/24 Left Brachial (Active)   Criteria for Appropriate Use Hemodynamically unstable, requiring monitoring lines, vasopressors, or volume resuscitation 04/19/24 0400   Site Assessment Clean, dry & intact 04/19/24 0400   Phlebitis Assessment No symptoms 04/19/24 0400   Infiltration Assessment 0 04/19/24 0400   Line Status Flushed;Capped;Normal saline locked 04/19/24 0400   Line Care Connections checked and tightened 04/19/24 0400   Alcohol Cap Used Yes 04/19/24 0400   Date of Last Dressing Change 04/16/24 04/19/24 0400   Dressing Type Transparent w/CHG gel 04/19/24 0400   Dressing Status Clean, dry & intact 04/19/24 0400   Dressing Intervention New;Dressing changed 04/16/24 1559       Extended Dwell Peripherial IV 04/09/24 Left Brachial (Active)   Criteria for Appropriate Use Hemodynamically unstable, requiring monitoring lines,

## 2024-04-19 NOTE — PLAN OF CARE
Problem: Safety - Adult  Goal: Free from fall injury  Outcome: Progressing     Problem: Discharge Planning  Goal: Discharge to home or other facility with appropriate resources  Outcome: Progressing  Flowsheets (Taken 4/18/2024 2100)  Discharge to home or other facility with appropriate resources:   Identify barriers to discharge with patient and caregiver   Identify discharge learning needs (meds, wound care, etc)     Problem: Pain  Goal: Verbalizes/displays adequate comfort level or baseline comfort level  Outcome: Progressing     Problem: Chronic Conditions and Co-morbidities  Goal: Patient's chronic conditions and co-morbidity symptoms are monitored and maintained or improved  Outcome: Progressing  Flowsheets (Taken 4/18/2024 2100)  Care Plan - Patient's Chronic Conditions and Co-Morbidity Symptoms are Monitored and Maintained or Improved: Monitor and assess patient's chronic conditions and comorbid symptoms for stability, deterioration, or improvement     Problem: Skin/Tissue Integrity  Goal: Absence of new skin breakdown  Description: 1.  Monitor for areas of redness and/or skin breakdown  2.  Assess vascular access sites hourly  3.  Every 4-6 hours minimum:  Change oxygen saturation probe site  4.  Every 4-6 hours:  If on nasal continuous positive airway pressure, respiratory therapy assess nares and determine need for appliance change or resting period.  Outcome: Progressing     Problem: Nutrition Deficit:  Goal: Optimize nutritional status  Outcome: Progressing     Problem: SLP Adult - Impaired Swallowing  Goal: By Discharge: Advance to least restrictive diet without signs or symptoms of aspiration for planned discharge setting.  See evaluation for individualized goals.  Description: Speech Therapy Goals  Initiated 4/17/2024    1. Patient will tolerate least restrictive diet without adverse effects within 7 days.      4/18/2024 1522 by Prema Romo, SLP  Outcome: Progressing     Problem: Occupational

## 2024-04-19 NOTE — PROGRESS NOTES
Occupational Therapy:   04/19/2024    Chart reviewed in prep for OT tx session; attempted to see however currently off unit for permacath placement. Will defer and follow back up as able/appropriate.    Thank you,  LAURA Ruiz, OTR/L

## 2024-04-20 ENCOUNTER — APPOINTMENT (OUTPATIENT)
Facility: HOSPITAL | Age: 79
DRG: 356 | End: 2024-04-20
Payer: MEDICARE

## 2024-04-20 LAB
ALBUMIN SERPL-MCNC: 3.6 G/DL (ref 3.5–5)
ALBUMIN/GLOB SERPL: 1.1 (ref 1.1–2.2)
ALP SERPL-CCNC: 90 U/L (ref 45–117)
ALT SERPL-CCNC: 30 U/L (ref 12–78)
ANION GAP SERPL CALC-SCNC: 11 MMOL/L (ref 5–15)
AST SERPL-CCNC: 35 U/L (ref 15–37)
BASOPHILS # BLD: 0 K/UL (ref 0–0.1)
BASOPHILS NFR BLD: 0 % (ref 0–1)
BILIRUB SERPL-MCNC: 1.3 MG/DL (ref 0.2–1)
BUN SERPL-MCNC: 34 MG/DL (ref 6–20)
BUN/CREAT SERPL: 12 (ref 12–20)
CALCIUM SERPL-MCNC: 9 MG/DL (ref 8.5–10.1)
CHLORIDE SERPL-SCNC: 102 MMOL/L (ref 97–108)
CO2 SERPL-SCNC: 26 MMOL/L (ref 21–32)
CREAT SERPL-MCNC: 2.73 MG/DL (ref 0.55–1.02)
DIFFERENTIAL METHOD BLD: ABNORMAL
EOSINOPHIL # BLD: 0 K/UL (ref 0–0.4)
EOSINOPHIL NFR BLD: 0 % (ref 0–7)
ERYTHROCYTE [DISTWIDTH] IN BLOOD BY AUTOMATED COUNT: 16.2 % (ref 11.5–14.5)
GLOBULIN SER CALC-MCNC: 3.4 G/DL (ref 2–4)
GLUCOSE BLD STRIP.AUTO-MCNC: 106 MG/DL (ref 65–117)
GLUCOSE BLD STRIP.AUTO-MCNC: 115 MG/DL (ref 65–117)
GLUCOSE BLD STRIP.AUTO-MCNC: 117 MG/DL (ref 65–117)
GLUCOSE BLD STRIP.AUTO-MCNC: 200 MG/DL (ref 65–117)
GLUCOSE BLD STRIP.AUTO-MCNC: 201 MG/DL (ref 65–117)
GLUCOSE SERPL-MCNC: 129 MG/DL (ref 65–100)
HCT VFR BLD AUTO: 26.6 % (ref 35–47)
HGB BLD-MCNC: 8.6 G/DL (ref 11.5–16)
IMM GRANULOCYTES # BLD AUTO: 0.2 K/UL (ref 0–0.04)
IMM GRANULOCYTES NFR BLD AUTO: 2 % (ref 0–0.5)
LYMPHOCYTES # BLD: 1.3 K/UL (ref 0.8–3.5)
LYMPHOCYTES NFR BLD: 12 % (ref 12–49)
MAGNESIUM SERPL-MCNC: 2.4 MG/DL (ref 1.6–2.4)
MCH RBC QN AUTO: 31.4 PG (ref 26–34)
MCHC RBC AUTO-ENTMCNC: 32.3 G/DL (ref 30–36.5)
MCV RBC AUTO: 97.1 FL (ref 80–99)
MONOCYTES # BLD: 0.5 K/UL (ref 0–1)
MONOCYTES NFR BLD: 5 % (ref 5–13)
NEUTS SEG # BLD: 8.4 K/UL (ref 1.8–8)
NEUTS SEG NFR BLD: 81 % (ref 32–75)
NRBC # BLD: 0 K/UL (ref 0–0.01)
NRBC BLD-RTO: 0 PER 100 WBC
PHOSPHATE SERPL-MCNC: 3.3 MG/DL (ref 2.6–4.7)
PLATELET # BLD AUTO: 164 K/UL (ref 150–400)
PMV BLD AUTO: 11.4 FL (ref 8.9–12.9)
POTASSIUM SERPL-SCNC: 3.2 MMOL/L (ref 3.5–5.1)
PROT SERPL-MCNC: 7 G/DL (ref 6.4–8.2)
RBC # BLD AUTO: 2.74 M/UL (ref 3.8–5.2)
SERVICE CMNT-IMP: ABNORMAL
SERVICE CMNT-IMP: ABNORMAL
SERVICE CMNT-IMP: NORMAL
SODIUM SERPL-SCNC: 139 MMOL/L (ref 136–145)
WBC # BLD AUTO: 10.4 K/UL (ref 3.6–11)

## 2024-04-20 PROCEDURE — 83735 ASSAY OF MAGNESIUM: CPT

## 2024-04-20 PROCEDURE — 2580000003 HC RX 258: Performed by: NURSE PRACTITIONER

## 2024-04-20 PROCEDURE — 85025 COMPLETE CBC W/AUTO DIFF WBC: CPT

## 2024-04-20 PROCEDURE — 36415 COLL VENOUS BLD VENIPUNCTURE: CPT

## 2024-04-20 PROCEDURE — 51798 US URINE CAPACITY MEASURE: CPT

## 2024-04-20 PROCEDURE — 84100 ASSAY OF PHOSPHORUS: CPT

## 2024-04-20 PROCEDURE — 71045 X-RAY EXAM CHEST 1 VIEW: CPT

## 2024-04-20 PROCEDURE — 82962 GLUCOSE BLOOD TEST: CPT

## 2024-04-20 PROCEDURE — 86705 HEP B CORE ANTIBODY IGM: CPT

## 2024-04-20 PROCEDURE — 86704 HEP B CORE ANTIBODY TOTAL: CPT

## 2024-04-20 PROCEDURE — 6370000000 HC RX 637 (ALT 250 FOR IP): Performed by: STUDENT IN AN ORGANIZED HEALTH CARE EDUCATION/TRAINING PROGRAM

## 2024-04-20 PROCEDURE — 51701 INSERT BLADDER CATHETER: CPT

## 2024-04-20 PROCEDURE — 6360000002 HC RX W HCPCS: Performed by: INTERNAL MEDICINE

## 2024-04-20 PROCEDURE — 2060000000 HC ICU INTERMEDIATE R&B

## 2024-04-20 PROCEDURE — 6370000000 HC RX 637 (ALT 250 FOR IP): Performed by: INTERNAL MEDICINE

## 2024-04-20 PROCEDURE — 80053 COMPREHEN METABOLIC PANEL: CPT

## 2024-04-20 RX ORDER — LABETALOL 100 MG/1
50 TABLET, FILM COATED ORAL 2 TIMES DAILY
Status: DISCONTINUED | OUTPATIENT
Start: 2024-04-20 | End: 2024-04-21

## 2024-04-20 RX ADMIN — ALBUTEROL SULFATE 2.5 MG: 2.5 SOLUTION RESPIRATORY (INHALATION) at 14:58

## 2024-04-20 RX ADMIN — NIFEDIPINE 30 MG: 30 TABLET, FILM COATED, EXTENDED RELEASE ORAL at 09:21

## 2024-04-20 RX ADMIN — Medication: at 09:21

## 2024-04-20 RX ADMIN — Medication: at 14:29

## 2024-04-20 RX ADMIN — INSULIN LISPRO 2 UNITS: 100 INJECTION, SOLUTION INTRAVENOUS; SUBCUTANEOUS at 18:47

## 2024-04-20 RX ADMIN — SODIUM CHLORIDE, PRESERVATIVE FREE 10 ML: 5 INJECTION INTRAVENOUS at 21:54

## 2024-04-20 RX ADMIN — SODIUM CHLORIDE, PRESERVATIVE FREE 10 ML: 5 INJECTION INTRAVENOUS at 09:21

## 2024-04-20 RX ADMIN — ATORVASTATIN CALCIUM 40 MG: 40 TABLET, FILM COATED ORAL at 09:21

## 2024-04-20 RX ADMIN — POTASSIUM BICARBONATE 40 MEQ: 782 TABLET, EFFERVESCENT ORAL at 09:21

## 2024-04-20 RX ADMIN — Medication: at 21:54

## 2024-04-20 ASSESSMENT — PAIN SCALES - GENERAL
PAINLEVEL_OUTOF10: 0

## 2024-04-20 NOTE — PROGRESS NOTES
PO if not otherwise noted.      Admission Status:[id number]:::1}      Medications Reviewed:     Current Facility-Administered Medications   Medication Dose Route Frequency    balsum peru-castor oil (VENELEX) ointment   Topical TID    melatonin tablet 3 mg  3 mg Oral Nightly PRN    atorvastatin (LIPITOR) tablet 40 mg  40 mg Oral Daily    [Held by provider] losartan (COZAAR) tablet 25 mg  25 mg Oral Daily    [Held by provider] insulin glargine (LANTUS) injection vial 12 Units  12 Units SubCUTAneous Daily    [Held by provider] furosemide (LASIX) tablet 20 mg  20 mg Oral Daily    NIFEdipine (PROCARDIA XL) extended release tablet 30 mg  30 mg Oral Daily    labetalol (NORMODYNE) tablet 100 mg  100 mg Oral BID    heparin (porcine) 1000 UNIT/ML injection 1,900 Units  1,900 Units IntraCATHeter PRN    And    heparin (porcine) 1000 UNIT/ML injection 1,900 Units  1,900 Units IntraCATHeter PRN    albuterol (PROVENTIL) (2.5 MG/3ML) 0.083% nebulizer solution 2.5 mg  2.5 mg Nebulization Q6H PRN    labetalol (NORMODYNE;TRANDATE) injection 20 mg  20 mg IntraVENous Q6H PRN    hydrALAZINE (APRESOLINE) injection 10 mg  10 mg IntraVENous Q6H PRN    albumin human 25% IV solution 25 g  25 g IntraVENous PRN    oxyCODONE (ROXICODONE) immediate release tablet 5 mg  5 mg Oral Q4H PRN    Or    oxyCODONE (ROXICODONE) immediate release tablet 10 mg  10 mg Oral Q4H PRN    glucose chewable tablet 16 g  4 tablet Oral PRN    dextrose bolus 10% 125 mL  125 mL IntraVENous PRN    Or    dextrose bolus 10% 250 mL  250 mL IntraVENous PRN    glucagon injection 1 mg  1 mg SubCUTAneous PRN    dextrose 10 % infusion   IntraVENous Continuous PRN    insulin lispro (HUMALOG) injection vial 0-8 Units  0-8 Units SubCUTAneous Q6H    0.9 % sodium chloride infusion   IntraVENous PRN    sodium chloride flush 0.9 % injection 5-40 mL  5-40 mL IntraVENous 2 times per day    sodium chloride flush 0.9 % injection 5-40 mL  5-40 mL IntraVENous PRN    0.9 % sodium chloride  infusion   IntraVENous PRN    ondansetron (ZOFRAN-ODT) disintegrating tablet 4 mg  4 mg Oral Q8H PRN    Or    ondansetron (ZOFRAN) injection 4 mg  4 mg IntraVENous Q6H PRN    polyethylene glycol (GLYCOLAX) packet 17 g  17 g Oral Daily PRN    acetaminophen (TYLENOL) tablet 650 mg  650 mg Oral Q6H PRN    Or    acetaminophen (TYLENOL) suppository 650 mg  650 mg Rectal Q6H PRN     ______________________________________________________________________  EXPECTED LENGTH OF STAY: 13  ACTUAL LENGTH OF STAY:          11                 Sushma Madala, MD

## 2024-04-20 NOTE — FLOWSHEET NOTE
04/19/24 1938   Vital Signs   BP (!) 101/46   Pulse 70   Pain Assessment   Pain Assessment None - Denies Pain   Pain Level 0   Post-Hemodialysis Assessment   Post-Treatment Procedures Blood returned;Catheter Capped, clamped with Saline x2 ports   Machine Disinfection Process Acid/Vinegar Clean;Heat Disinfect;Exterior Machine Disinfection   Rinseback Volume (ml) 300 ml   Dialyzer Clearance Clear   Duration of Treatment (minutes) 180 minutes   Hemodialysis Intake (ml) 500 ml   Hemodialysis Output (ml) 2500 ml   NET Removed (ml) 2000   Tolerated Treatment Fair  (Low bp gave Albumin 25g to support blood pressure)   Interventions Taken Medication   Patient Response to Treatment tolerated treatment   Bilateral Breath Sounds Other (Comment)  (non productive cough)   Edema None   Physician Notified Yes  (informed Dr Ewing that lines had to be reveresed for opitmal  r/t high arterial pressure; both ports flush well and no resistance with pull however appx 30 mins into treatment noted high APand machine alarming lines reversed no other issues noted)   Time Off 1938   Patient Disposition Other (Comment)  (patient remained in room 663)   Observations & Evaluations   Level of Consciousness 0   Heart Rhythm Regular   Respiratory Quality/Effort Unlabored   O2 Device None (Room air)   Skin Condition/Temp Warm   Abdomen Inspection Soft     Primary RN SBAR: Alessia Ma RN

## 2024-04-20 NOTE — PLAN OF CARE
Problem: Safety - Medical Restraint  Goal: Remains free of injury from restraints (Restraint for Interference with Medical Device)  Description: INTERVENTIONS:  1. Determine that other, less restrictive measures have been tried or would not be effective before applying the restraint  2. Evaluate the patient's condition at the time of restraint application  3. Inform patient/family regarding the reason for restraint  4. Q2H: Monitor safety, psychosocial status, comfort, nutrition and hydration  4/19/2024 2350 by Christina Chowdhury RN  Outcome: Progressing  4/19/2024 2125 by Alessia Ma RN  Outcome: Progressing     Problem: Safety - Adult  Goal: Free from fall injury  4/19/2024 2350 by Christina Chowdhury RN  Outcome: Progressing  4/19/2024 2125 by Alessia Ma RN  Outcome: Progressing  Flowsheets (Taken 4/19/2024 0937)  Free From Fall Injury: Instruct family/caregiver on patient safety     Problem: Discharge Planning  Goal: Discharge to home or other facility with appropriate resources  4/19/2024 2350 by Christina Chowdhury RN  Outcome: Progressing  4/19/2024 2125 by Alessia Ma RN  Outcome: Progressing  Flowsheets (Taken 4/19/2024 1200)  Discharge to home or other facility with appropriate resources: Identify barriers to discharge with patient and caregiver     Problem: Pain  Goal: Verbalizes/displays adequate comfort level or baseline comfort level  4/19/2024 2350 by Christina Chowdhury RN  Outcome: Progressing  4/19/2024 2125 by Alessia Ma RN  Outcome: Progressing     Problem: Chronic Conditions and Co-morbidities  Goal: Patient's chronic conditions and co-morbidity symptoms are monitored and maintained or improved  4/19/2024 2350 by Christina Chowdhury RN  Outcome: Progressing  4/19/2024 2125 by Alessia Ma RN  Outcome: Progressing  Flowsheets (Taken 4/19/2024 1200)  Care Plan - Patient's Chronic Conditions and Co-Morbidity Symptoms are Monitored and Maintained or Improved:   Monitor and assess patient's chronic

## 2024-04-20 NOTE — PLAN OF CARE
Problem: Safety - Medical Restraint  Goal: Remains free of injury from restraints (Restraint for Interference with Medical Device)  Description: INTERVENTIONS:  1. Determine that other, less restrictive measures have been tried or would not be effective before applying the restraint  2. Evaluate the patient's condition at the time of restraint application  3. Inform patient/family regarding the reason for restraint  4. Q2H: Monitor safety, psychosocial status, comfort, nutrition and hydration  Outcome: Progressing     Problem: Safety - Adult  Goal: Free from fall injury  Outcome: Progressing  Flowsheets (Taken 4/19/2024 0937)  Free From Fall Injury: Instruct family/caregiver on patient safety     Problem: Discharge Planning  Goal: Discharge to home or other facility with appropriate resources  Outcome: Progressing  Flowsheets (Taken 4/19/2024 1200)  Discharge to home or other facility with appropriate resources: Identify barriers to discharge with patient and caregiver     Problem: Pain  Goal: Verbalizes/displays adequate comfort level or baseline comfort level  Outcome: Progressing     Problem: Chronic Conditions and Co-morbidities  Goal: Patient's chronic conditions and co-morbidity symptoms are monitored and maintained or improved  Outcome: Progressing  Flowsheets (Taken 4/19/2024 1200)  Care Plan - Patient's Chronic Conditions and Co-Morbidity Symptoms are Monitored and Maintained or Improved:   Monitor and assess patient's chronic conditions and comorbid symptoms for stability, deterioration, or improvement   Collaborate with multidisciplinary team to address chronic and comorbid conditions and prevent exacerbation or deterioration   Update acute care plan with appropriate goals if chronic or comorbid symptoms are exacerbated and prevent overall improvement and discharge     Problem: Skin/Tissue Integrity  Goal: Absence of new skin breakdown  Description: 1.  Monitor for areas of redness and/or skin

## 2024-04-20 NOTE — PLAN OF CARE
Problem: Safety - Medical Restraint  Goal: Remains free of injury from restraints (Restraint for Interference with Medical Device)  Description: INTERVENTIONS:  1. Determine that other, less restrictive measures have been tried or would not be effective before applying the restraint  2. Evaluate the patient's condition at the time of restraint application  3. Inform patient/family regarding the reason for restraint  4. Q2H: Monitor safety, psychosocial status, comfort, nutrition and hydration  4/20/2024 0910 by Clair Donahue RN  Outcome: Progressing  4/19/2024 2350 by Christina Chowdhury RN  Outcome: Progressing  4/19/2024 2125 by Alessia Ma RN  Outcome: Progressing     Problem: Safety - Adult  Goal: Free from fall injury  4/20/2024 0910 by Clair Donahue RN  Outcome: Progressing  4/19/2024 2350 by Christina Chowdhury RN  Outcome: Progressing  4/19/2024 2125 by Alessia Ma RN  Outcome: Progressing  Flowsheets (Taken 4/19/2024 0937)  Free From Fall Injury: Instruct family/caregiver on patient safety     Problem: Discharge Planning  Goal: Discharge to home or other facility with appropriate resources  4/20/2024 0910 by Clair Donahue RN  Outcome: Progressing  4/19/2024 2350 by Christina Chowdhury RN  Outcome: Progressing  4/19/2024 2125 by Alessia Ma RN  Outcome: Progressing  Flowsheets  Taken 4/19/2024 2000 by Christina Chowdhury RN  Discharge to home or other facility with appropriate resources: Identify barriers to discharge with patient and caregiver  Taken 4/19/2024 1200 by Alessia Ma RN  Discharge to home or other facility with appropriate resources: Identify barriers to discharge with patient and caregiver     Problem: Pain  Goal: Verbalizes/displays adequate comfort level or baseline comfort level  4/20/2024 0910 by Clair Donahue RN  Outcome: Progressing  4/19/2024 2350 by Christina Chowdhury RN  Outcome: Progressing  4/19/2024 2125 by Alessia Ma RN  Outcome: Progressing     Problem: Chronic Conditions and

## 2024-04-21 ENCOUNTER — APPOINTMENT (OUTPATIENT)
Facility: HOSPITAL | Age: 79
DRG: 356 | End: 2024-04-21
Payer: MEDICARE

## 2024-04-21 LAB
ALBUMIN SERPL-MCNC: 3.2 G/DL (ref 3.5–5)
ALBUMIN/GLOB SERPL: 1 (ref 1.1–2.2)
ALP SERPL-CCNC: 83 U/L (ref 45–117)
ALT SERPL-CCNC: 26 U/L (ref 12–78)
ANION GAP SERPL CALC-SCNC: 10 MMOL/L (ref 5–15)
AST SERPL-CCNC: 42 U/L (ref 15–37)
BASOPHILS # BLD: 0.1 K/UL (ref 0–0.1)
BASOPHILS NFR BLD: 0 % (ref 0–1)
BILIRUB SERPL-MCNC: 1 MG/DL (ref 0.2–1)
BUN SERPL-MCNC: 68 MG/DL (ref 6–20)
BUN/CREAT SERPL: 15 (ref 12–20)
CALCIUM SERPL-MCNC: 8.3 MG/DL (ref 8.5–10.1)
CHLORIDE SERPL-SCNC: 99 MMOL/L (ref 97–108)
CO2 SERPL-SCNC: 27 MMOL/L (ref 21–32)
CREAT SERPL-MCNC: 4.57 MG/DL (ref 0.55–1.02)
DIFFERENTIAL METHOD BLD: ABNORMAL
EOSINOPHIL # BLD: 0 K/UL (ref 0–0.4)
EOSINOPHIL NFR BLD: 0 % (ref 0–7)
ERYTHROCYTE [DISTWIDTH] IN BLOOD BY AUTOMATED COUNT: 16.8 % (ref 11.5–14.5)
GLOBULIN SER CALC-MCNC: 3.1 G/DL (ref 2–4)
GLUCOSE BLD STRIP.AUTO-MCNC: 116 MG/DL (ref 65–117)
GLUCOSE BLD STRIP.AUTO-MCNC: 193 MG/DL (ref 65–117)
GLUCOSE BLD STRIP.AUTO-MCNC: 211 MG/DL (ref 65–117)
GLUCOSE BLD STRIP.AUTO-MCNC: 220 MG/DL (ref 65–117)
GLUCOSE BLD STRIP.AUTO-MCNC: 231 MG/DL (ref 65–117)
GLUCOSE SERPL-MCNC: 200 MG/DL (ref 65–100)
HCT VFR BLD AUTO: 25.3 % (ref 35–47)
HGB BLD-MCNC: 8.5 G/DL (ref 11.5–16)
IMM GRANULOCYTES # BLD AUTO: 0.1 K/UL (ref 0–0.04)
IMM GRANULOCYTES NFR BLD AUTO: 1 % (ref 0–0.5)
LYMPHOCYTES # BLD: 1.4 K/UL (ref 0.8–3.5)
LYMPHOCYTES NFR BLD: 13 % (ref 12–49)
MAGNESIUM SERPL-MCNC: 2.3 MG/DL (ref 1.6–2.4)
MCH RBC QN AUTO: 32.2 PG (ref 26–34)
MCHC RBC AUTO-ENTMCNC: 33.6 G/DL (ref 30–36.5)
MCV RBC AUTO: 95.8 FL (ref 80–99)
MONOCYTES # BLD: 0.7 K/UL (ref 0–1)
MONOCYTES NFR BLD: 6 % (ref 5–13)
NEUTS SEG # BLD: 9.1 K/UL (ref 1.8–8)
NEUTS SEG NFR BLD: 80 % (ref 32–75)
NRBC # BLD: 0 K/UL (ref 0–0.01)
NRBC BLD-RTO: 0 PER 100 WBC
PHOSPHATE SERPL-MCNC: 2.9 MG/DL (ref 2.6–4.7)
PLATELET # BLD AUTO: 170 K/UL (ref 150–400)
PMV BLD AUTO: 10.9 FL (ref 8.9–12.9)
POTASSIUM SERPL-SCNC: 3.5 MMOL/L (ref 3.5–5.1)
PROT SERPL-MCNC: 6.3 G/DL (ref 6.4–8.2)
RBC # BLD AUTO: 2.64 M/UL (ref 3.8–5.2)
SERVICE CMNT-IMP: ABNORMAL
SERVICE CMNT-IMP: NORMAL
SODIUM SERPL-SCNC: 136 MMOL/L (ref 136–145)
WBC # BLD AUTO: 11.4 K/UL (ref 3.6–11)

## 2024-04-21 PROCEDURE — 83735 ASSAY OF MAGNESIUM: CPT

## 2024-04-21 PROCEDURE — 82962 GLUCOSE BLOOD TEST: CPT

## 2024-04-21 PROCEDURE — 84100 ASSAY OF PHOSPHORUS: CPT

## 2024-04-21 PROCEDURE — 36415 COLL VENOUS BLD VENIPUNCTURE: CPT

## 2024-04-21 PROCEDURE — 2580000003 HC RX 258: Performed by: NURSE PRACTITIONER

## 2024-04-21 PROCEDURE — 94760 N-INVAS EAR/PLS OXIMETRY 1: CPT

## 2024-04-21 PROCEDURE — 6370000000 HC RX 637 (ALT 250 FOR IP): Performed by: STUDENT IN AN ORGANIZED HEALTH CARE EDUCATION/TRAINING PROGRAM

## 2024-04-21 PROCEDURE — 80053 COMPREHEN METABOLIC PANEL: CPT

## 2024-04-21 PROCEDURE — 85025 COMPLETE CBC W/AUTO DIFF WBC: CPT

## 2024-04-21 PROCEDURE — 2060000000 HC ICU INTERMEDIATE R&B

## 2024-04-21 PROCEDURE — 6370000000 HC RX 637 (ALT 250 FOR IP): Performed by: INTERNAL MEDICINE

## 2024-04-21 RX ADMIN — NIFEDIPINE 30 MG: 30 TABLET, FILM COATED, EXTENDED RELEASE ORAL at 08:42

## 2024-04-21 RX ADMIN — ATORVASTATIN CALCIUM 40 MG: 40 TABLET, FILM COATED ORAL at 08:42

## 2024-04-21 RX ADMIN — SODIUM CHLORIDE, PRESERVATIVE FREE 10 ML: 5 INJECTION INTRAVENOUS at 08:42

## 2024-04-21 RX ADMIN — Medication: at 22:09

## 2024-04-21 RX ADMIN — SODIUM CHLORIDE, PRESERVATIVE FREE 10 ML: 5 INJECTION INTRAVENOUS at 22:10

## 2024-04-21 RX ADMIN — Medication: at 14:05

## 2024-04-21 RX ADMIN — Medication: at 08:42

## 2024-04-21 RX ADMIN — INSULIN LISPRO 2 UNITS: 100 INJECTION, SOLUTION INTRAVENOUS; SUBCUTANEOUS at 12:03

## 2024-04-21 RX ADMIN — INSULIN LISPRO 2 UNITS: 100 INJECTION, SOLUTION INTRAVENOUS; SUBCUTANEOUS at 01:05

## 2024-04-21 ASSESSMENT — PAIN SCALES - GENERAL
PAINLEVEL_OUTOF10: 0

## 2024-04-21 NOTE — PLAN OF CARE
Problem: Safety - Medical Restraint  Goal: Remains free of injury from restraints (Restraint for Interference with Medical Device)  Description: INTERVENTIONS:  1. Determine that other, less restrictive measures have been tried or would not be effective before applying the restraint  2. Evaluate the patient's condition at the time of restraint application  3. Inform patient/family regarding the reason for restraint  4. Q2H: Monitor safety, psychosocial status, comfort, nutrition and hydration  Outcome: Progressing     Problem: Safety - Adult  Goal: Free from fall injury  Outcome: Progressing  Flowsheets (Taken 4/20/2024 2200)  Free From Fall Injury: Instruct family/caregiver on patient safety     Problem: Discharge Planning  Goal: Discharge to home or other facility with appropriate resources  Outcome: Progressing  Flowsheets (Taken 4/20/2024 2000)  Discharge to home or other facility with appropriate resources: Identify barriers to discharge with patient and caregiver     Problem: Pain  Goal: Verbalizes/displays adequate comfort level or baseline comfort level  Outcome: Progressing     Problem: Chronic Conditions and Co-morbidities  Goal: Patient's chronic conditions and co-morbidity symptoms are monitored and maintained or improved  Outcome: Progressing  Flowsheets (Taken 4/20/2024 2000)  Care Plan - Patient's Chronic Conditions and Co-Morbidity Symptoms are Monitored and Maintained or Improved: Monitor and assess patient's chronic conditions and comorbid symptoms for stability, deterioration, or improvement     Problem: Skin/Tissue Integrity  Goal: Absence of new skin breakdown  Description: 1.  Monitor for areas of redness and/or skin breakdown  2.  Assess vascular access sites hourly  3.  Every 4-6 hours minimum:  Change oxygen saturation probe site  4.  Every 4-6 hours:  If on nasal continuous positive airway pressure, respiratory therapy assess nares and determine need for appliance change or resting  period.  Outcome: Progressing     Problem: Nutrition Deficit:  Goal: Optimize nutritional status  Outcome: Progressing

## 2024-04-21 NOTE — PROGRESS NOTES
[facility]                                                                                          Hospitalist Progress Note  Sushma Madala, MD  Office Phone: [phone]        Date of Service:  2024  NAME:  Rozina Doss  :  1945  MRN:  [id number]       Admission Summary:   Rozina Doss is a 78 y.o. female who was initially admitted to [facility] on .  Patient initially presented with cough, malaise and weakness.  Subsequent developed flash pulmonary edema and required BiPAP.  Patient was admitted to ICU at [facility].  On , patient became significantly hypotensive, hypothermic and workup shows that hemoglobin has significantly dropped from 7.4-3.9.  CT angiogram of the abdomen and pelvis to enlarging right retroperitoneal hematoma with active bleeding.  Subsequently patient was intubated at the outside hospital, started on pressors, also began transfusion of 3 units PRBCs and 2 units FFP.  Patient subsequently transferred to [facility] for IR evaluation.  Patient was evaluated by IR upon arrival at [facility] and went for arteriogram which shows multiple right lumbar/retroperitoneal arteries showing active bleeding and subsequently embolization was performed.  Patient was intubated for the procedure and subsequently admitted to ICU.  Patient was able to be extubated on  and transition to BiPAP.  Patient also developed ARIC due to ATN from hemorrhagic shock and started on CRRT this admission and transition to HD MWF.  Nephrology following.  Patient has been seen by speech therapy and started on diet.  Patient transferred out of ICU today to assume care under the hospitalist service.        Interval history / Subjective:   Patient is seen and examined at bedside this AM. Pt feels ok. No overnight events. Eating better  Discussed with nursing     Assessment & Plan:      Hemorrhagic      Current Facility-Administered Medications   Medication Dose Route Frequency    balsum peru-castor oil (VENELEX) ointment   Topical TID    melatonin tablet 3 mg  3 mg Oral Nightly PRN    atorvastatin (LIPITOR) tablet 40 mg  40 mg Oral Daily    [Held by provider] losartan (COZAAR) tablet 25 mg  25 mg Oral Daily    [Held by provider] insulin glargine (LANTUS) injection vial 12 Units  12 Units SubCUTAneous Daily    [Held by provider] furosemide (LASIX) tablet 20 mg  20 mg Oral Daily    NIFEdipine (PROCARDIA XL) extended release tablet 30 mg  30 mg Oral Daily    heparin (porcine) 1000 UNIT/ML injection 1,900 Units  1,900 Units IntraCATHeter PRN    And    heparin (porcine) 1000 UNIT/ML injection 1,900 Units  1,900 Units IntraCATHeter PRN    albuterol (PROVENTIL) (2.5 MG/3ML) 0.083% nebulizer solution 2.5 mg  2.5 mg Nebulization Q6H PRN    labetalol (NORMODYNE;TRANDATE) injection 20 mg  20 mg IntraVENous Q6H PRN    hydrALAZINE (APRESOLINE) injection 10 mg  10 mg IntraVENous Q6H PRN    albumin human 25% IV solution 25 g  25 g IntraVENous PRN    oxyCODONE (ROXICODONE) immediate release tablet 5 mg  5 mg Oral Q4H PRN    Or    oxyCODONE (ROXICODONE) immediate release tablet 10 mg  10 mg Oral Q4H PRN    glucose chewable tablet 16 g  4 tablet Oral PRN    dextrose bolus 10% 125 mL  125 mL IntraVENous PRN    Or    dextrose bolus 10% 250 mL  250 mL IntraVENous PRN    glucagon injection 1 mg  1 mg SubCUTAneous PRN    dextrose 10 % infusion   IntraVENous Continuous PRN    insulin lispro (HUMALOG) injection vial 0-8 Units  0-8 Units SubCUTAneous Q6H    0.9 % sodium chloride infusion   IntraVENous PRN    sodium chloride flush 0.9 % injection 5-40 mL  5-40 mL IntraVENous 2 times per day    sodium chloride flush 0.9 % injection 5-40 mL  5-40 mL IntraVENous PRN    0.9 % sodium chloride infusion   IntraVENous PRN    ondansetron (ZOFRAN-ODT) disintegrating tablet 4 mg  4 mg Oral Q8H PRN    Or    ondansetron (ZOFRAN) injection 4 mg  4 mg

## 2024-04-21 NOTE — PLAN OF CARE
Problem: Safety - Medical Restraint  Goal: Remains free of injury from restraints (Restraint for Interference with Medical Device)  Description: INTERVENTIONS:  1. Determine that other, less restrictive measures have been tried or would not be effective before applying the restraint  2. Evaluate the patient's condition at the time of restraint application  3. Inform patient/family regarding the reason for restraint  4. Q2H: Monitor safety, psychosocial status, comfort, nutrition and hydration  4/21/2024 1009 by Clair Donahue RN  Outcome: Progressing  4/21/2024 0044 by Christina Chowdhury RN  Outcome: Progressing     Problem: Safety - Adult  Goal: Free from fall injury  4/21/2024 1009 by Clair Donahue RN  Outcome: Progressing  4/21/2024 0044 by Christina Chowdhury RN  Outcome: Progressing  Flowsheets (Taken 4/20/2024 2200)  Free From Fall Injury: Instruct family/caregiver on patient safety     Problem: Discharge Planning  Goal: Discharge to home or other facility with appropriate resources  4/21/2024 1009 by Clair Donahue RN  Outcome: Progressing  4/21/2024 0044 by Christina Chowdhury RN  Outcome: Progressing  Flowsheets (Taken 4/20/2024 2000)  Discharge to home or other facility with appropriate resources: Identify barriers to discharge with patient and caregiver     Problem: Pain  Goal: Verbalizes/displays adequate comfort level or baseline comfort level  4/21/2024 1009 by Clair Donahue RN  Outcome: Progressing  4/21/2024 0044 by Christina Chowdhury RN  Outcome: Progressing     Problem: Chronic Conditions and Co-morbidities  Goal: Patient's chronic conditions and co-morbidity symptoms are monitored and maintained or improved  4/21/2024 1009 by Clair Donahue RN  Outcome: Progressing  4/21/2024 0044 by Christina Chowdhury RN  Outcome: Progressing  Flowsheets (Taken 4/20/2024 2000)  Care Plan - Patient's Chronic Conditions and Co-Morbidity Symptoms are Monitored and Maintained or Improved: Monitor and assess patient's chronic

## 2024-04-22 LAB
ANION GAP SERPL CALC-SCNC: 11 MMOL/L (ref 5–15)
ANION GAP SERPL CALC-SCNC: 12 MMOL/L (ref 5–15)
BUN SERPL-MCNC: 94 MG/DL (ref 6–20)
BUN SERPL-MCNC: 99 MG/DL (ref 6–20)
BUN/CREAT SERPL: 15 (ref 12–20)
BUN/CREAT SERPL: 16 (ref 12–20)
CALCIUM SERPL-MCNC: 8.3 MG/DL (ref 8.5–10.1)
CALCIUM SERPL-MCNC: 8.9 MG/DL (ref 8.5–10.1)
CHLORIDE SERPL-SCNC: 96 MMOL/L (ref 97–108)
CHLORIDE SERPL-SCNC: 98 MMOL/L (ref 97–108)
CO2 SERPL-SCNC: 25 MMOL/L (ref 21–32)
CO2 SERPL-SCNC: 25 MMOL/L (ref 21–32)
CREAT SERPL-MCNC: 6.11 MG/DL (ref 0.55–1.02)
CREAT SERPL-MCNC: 6.24 MG/DL (ref 0.55–1.02)
ERYTHROCYTE [DISTWIDTH] IN BLOOD BY AUTOMATED COUNT: 17 % (ref 11.5–14.5)
GLUCOSE BLD STRIP.AUTO-MCNC: 136 MG/DL (ref 65–117)
GLUCOSE BLD STRIP.AUTO-MCNC: 148 MG/DL (ref 65–117)
GLUCOSE BLD STRIP.AUTO-MCNC: 155 MG/DL (ref 65–117)
GLUCOSE BLD STRIP.AUTO-MCNC: 212 MG/DL (ref 65–117)
GLUCOSE SERPL-MCNC: 138 MG/DL (ref 65–100)
GLUCOSE SERPL-MCNC: 158 MG/DL (ref 65–100)
HCT VFR BLD AUTO: 26.2 % (ref 35–47)
HGB BLD-MCNC: 8.4 G/DL (ref 11.5–16)
MCH RBC QN AUTO: 31.1 PG (ref 26–34)
MCHC RBC AUTO-ENTMCNC: 32.1 G/DL (ref 30–36.5)
MCV RBC AUTO: 97 FL (ref 80–99)
NRBC # BLD: 0 K/UL (ref 0–0.01)
NRBC BLD-RTO: 0 PER 100 WBC
PLATELET # BLD AUTO: 209 K/UL (ref 150–400)
PMV BLD AUTO: 11.3 FL (ref 8.9–12.9)
POTASSIUM SERPL-SCNC: 4.1 MMOL/L (ref 3.5–5.1)
POTASSIUM SERPL-SCNC: ABNORMAL MMOL/L (ref 3.5–5.1)
RBC # BLD AUTO: 2.7 M/UL (ref 3.8–5.2)
SERVICE CMNT-IMP: ABNORMAL
SODIUM SERPL-SCNC: 133 MMOL/L (ref 136–145)
SODIUM SERPL-SCNC: 134 MMOL/L (ref 136–145)
WBC # BLD AUTO: 11.2 K/UL (ref 3.6–11)

## 2024-04-22 PROCEDURE — 94640 AIRWAY INHALATION TREATMENT: CPT

## 2024-04-22 PROCEDURE — 6360000002 HC RX W HCPCS: Performed by: INTERNAL MEDICINE

## 2024-04-22 PROCEDURE — 82962 GLUCOSE BLOOD TEST: CPT

## 2024-04-22 PROCEDURE — 36415 COLL VENOUS BLD VENIPUNCTURE: CPT

## 2024-04-22 PROCEDURE — 97530 THERAPEUTIC ACTIVITIES: CPT

## 2024-04-22 PROCEDURE — 90935 HEMODIALYSIS ONE EVALUATION: CPT

## 2024-04-22 PROCEDURE — 6370000000 HC RX 637 (ALT 250 FOR IP): Performed by: STUDENT IN AN ORGANIZED HEALTH CARE EDUCATION/TRAINING PROGRAM

## 2024-04-22 PROCEDURE — 92526 ORAL FUNCTION THERAPY: CPT

## 2024-04-22 PROCEDURE — 2060000000 HC ICU INTERMEDIATE R&B

## 2024-04-22 PROCEDURE — 2580000003 HC RX 258: Performed by: NURSE PRACTITIONER

## 2024-04-22 PROCEDURE — 85027 COMPLETE CBC AUTOMATED: CPT

## 2024-04-22 PROCEDURE — 80048 BASIC METABOLIC PNL TOTAL CA: CPT

## 2024-04-22 RX ADMIN — SODIUM CHLORIDE, PRESERVATIVE FREE 10 ML: 5 INJECTION INTRAVENOUS at 20:52

## 2024-04-22 RX ADMIN — Medication: at 09:33

## 2024-04-22 RX ADMIN — INSULIN LISPRO 2 UNITS: 100 INJECTION, SOLUTION INTRAVENOUS; SUBCUTANEOUS at 18:57

## 2024-04-22 RX ADMIN — HEPARIN SODIUM 1900 UNITS: 1000 INJECTION INTRAVENOUS; SUBCUTANEOUS at 11:35

## 2024-04-22 RX ADMIN — Medication: at 13:28

## 2024-04-22 RX ADMIN — ALBUTEROL SULFATE 2.5 MG: 2.5 SOLUTION RESPIRATORY (INHALATION) at 18:27

## 2024-04-22 RX ADMIN — ALBUTEROL SULFATE 2.5 MG: 2.5 SOLUTION RESPIRATORY (INHALATION) at 00:38

## 2024-04-22 RX ADMIN — HEPARIN SODIUM 1900 UNITS: 1000 INJECTION INTRAVENOUS; SUBCUTANEOUS at 11:36

## 2024-04-22 RX ADMIN — INSULIN LISPRO 2 UNITS: 100 INJECTION, SOLUTION INTRAVENOUS; SUBCUTANEOUS at 00:23

## 2024-04-22 RX ADMIN — SODIUM CHLORIDE, PRESERVATIVE FREE 10 ML: 5 INJECTION INTRAVENOUS at 09:34

## 2024-04-22 ASSESSMENT — PAIN SCALES - GENERAL: PAINLEVEL_OUTOF10: 0

## 2024-04-22 NOTE — PLAN OF CARE
Problem: Physical Therapy - Adult  Goal: By Discharge: Performs mobility at highest level of function for planned discharge setting.  See evaluation for individualized goals.  Description: FUNCTIONAL STATUS PRIOR TO ADMISSION: Patient was modified independent using a single point cane for functional mobility. Pt's daughter reported pt is fairly sedentary    HOME SUPPORT PRIOR TO ADMISSION: The patient lived with family.    Physical Therapy Goals  Initiated 4/18/2024  1.  Patient will move from supine to sit and sit to supine and roll side to side in bed with maximal assistance within 7 day(s).    2.  Patient will perform sit to stand with maximal assistance within 7 day(s).  3.  Patient will transfer from bed to chair and chair to bed with maximal assistance using the least restrictive device within 7 day(s).  4.  Patient will ambulate with maximal assistance for 5 feet with the least restrictive device within 7 day(s).   5.  Patient will tolerate seated EOB x 10 minutes with mod A within 7 days    Outcome: Progressing     PHYSICAL THERAPY TREATMENT    Patient: Rozina Doss (78 y.o. female)  Date: 4/22/2024  Diagnosis: Hemorrhagic shock (HCC) [R57.8] Hemorrhagic shock (HCC)      Precautions: Fall Risk                      ASSESSMENT:  Patient continues to benefit from skilled PT services and is slowly progressing towards goals. Patient received in bed post HD and agreeable to treatment however very fatigued. Patient overall required total A x 2 for bed mobility with some attempts to initiate with LUE. Once EOB, patient reports dizziness however BP stable. Patient required frequent cues to remain alert. Tolerated lateral weight shifting on EOB with max to total A x 2. No attempts to correct to midline with RUE, some righting reaction observed with LUE. Patient eventually began to have decreased response to stimuli despite VSS, assisted to return to supine. Repositioned patient in bed. Discussed session

## 2024-04-22 NOTE — FLOWSHEET NOTE
04/22/24 0807   Vital Signs   BP (!) 133/49   Temp 97.9 °F (36.6 °C)   Pulse 74   Respirations 18   Pain Assessment   Pain Assessment None - Denies Pain   Treatment   Time On 0837   Time Off 1137   Treatment Goal 2kf in 3hrs   Observations & Evaluations   Level of Consciousness 0   Oriented X 4   Heart Rhythm Irregular   Respiratory Quality/Effort Unlabored   O2 Device None (Room air)   Bilateral Breath Sounds Diminished;Other (Comment)  (non productive cough)   Skin Condition/Temp Warm;Dry   Abdomen Inspection Soft   Technical Checks   Dialysis Machine No. 01   RO Machine Number [id number]   Dialyzer Lot No. [id number]   Tubing Lot Number [id number]   All Connections Secure Yes   NS Bag Yes   Saline Line Double Clamped Yes   Dialyzer Revaclear 300   Prime Volume (mL) 200 mL   ICEBOAT I;C;E;B;O;A;T   RO Machine Log Sheet Completed Yes   Machine Alarm Self Test Completed;Passed   Air Foam Detector Tested;Proper Function;pH Reading   Extracorporeal Circuit Tested for Integrity Yes   Machine Conductivity 13.6   Manual Conductivity 14   Manual Ph 7.2   Bleach Test (Neg) Yes   Bath Temperature 98.6 °F (37 °C)   Dialysis Bath   K+ (Potassium) 2   Ca+ (Calcium) 2.5   Na+ (Sodium) 138   HCO3 (Bicarb) 35   Treatment Initiation   Dialyze Hours 3   Treatment  Initiation Universal Precautions maintained;Lines secured to patient;Connections secured;Prime given;Arterial Parameters set;Venous Parameters set;Air foam detector engaged;[place] Device;Saline line double clamped;Hemo-Safe Applied   Hemodialysis Central Access Right Neck   Placement Date/Time: 04/19/24 1221   Inserted by: MARK Park  Insertion Practices: Chlorohexadine skin antisepsis;Optimal catheter site selection;Hand hygiene;Maximal barrier precautions;Sterile ultrasound technique  Orientation: Right  Access Locati...   Continued need for line? Yes   Site Assessment Clean, dry & intact   CVC Lumen Status Infusing   Venous Lumen Status Sluggish blood return;Flushed

## 2024-04-22 NOTE — CARE COORDINATION
Transition of Care Plan:    [facility] will require insurance auth through [employer], started 4/22   - Hep B total core lab pending (ordered 4/22)    ([place] selected on chart, however Pt will be going to [facility])    [facility] [place]: [address], [place] [address]  Liaison: Yokasta Gill Ph. [phone]; Fx. [phone]; email [email or web address]     [facility] referral sent 4/19  [facility] requested 4/19    RUR: 26% High   Prior Level of Functioning: Independent   Disposition: SNF   If SNF or IPR: Date FOC offered: 4/18/24  Date FOC received: 4/18/24  Accepting facility: [facility]   DME needed: TBD  Transportation at discharge: S  /[facility] Medicare letter given: No  Is patient a [place] and connected with [facility]? No  Caregiver Contact: Daughter Hyacinth Schaeffer [phone]  Discharge Caregiver contacted prior to discharge? Yes  Care Conference needed? No  Barriers to discharge:  Medical - Hep B Total core lab needed; Auth - needed through [employer]     Pt case discussed in IDRs, plan to initiate insurance auth through [employer] today.     CM spoke with Parisa in Admissions (covering for Yokasta) at the [facility]. They have asked for auth to be started once Total core lab results.     2:15pm: CM has initiated insurance auth through [employer]; start date request 4/24 (due to Hep B Total Core lab). Reference #[id number]. Clinicals faxed to [phone].    3:45pm: Update on DC plan provided to Pt's daughter Hyacinth.    PATRICIA Guillory (Ally).

## 2024-04-22 NOTE — PROGRESS NOTES
[address] [place] [address]  Phone: [phone]   [phone]    [email or web address]     Nephrology Progress Note    Patient Name : Rozina Doss      : 1945     MRN : [id number]  Date of Admission : 2024  Date of Servive : 24    CC:  Follow up for ARIC on CKD       Assessment and Plan   ARIC on CKD IV:  - 2/2 ATN from hemorrhagic shock  - HD MWF   -  PC placement  - CM to arrange out pt HD     CKD IV:  - baseline Cr around 2.5  - f/b [facility]        Hemorrhagic shock:  - Hgb stable  - transfused if hgb < 7     Shock liver:  - trend LFTs    RP bleed s/p coil ebmolization      DM2  Obesity  HTN     Interval History:  Seen and examined on HD. No complaints     Review of Systems: Pertinent items are noted in HPI.    Current Medications:   Current Facility-Administered Medications   Medication Dose Route Frequency    balsum peru-castor oil (VENELEX) ointment   Topical TID    melatonin tablet 3 mg  3 mg Oral Nightly PRN    atorvastatin (LIPITOR) tablet 40 mg  40 mg Oral Daily    [Held by provider] insulin glargine (LANTUS) injection vial 12 Units  12 Units SubCUTAneous Daily    [Held by provider] furosemide (LASIX) tablet 20 mg  20 mg Oral Daily    [Held by provider] NIFEdipine (PROCARDIA XL) extended release tablet 30 mg  30 mg Oral Daily    heparin (porcine) 1000 UNIT/ML injection 1,900 Units  1,900 Units IntraCATHeter PRN    And    heparin (porcine) 1000 UNIT/ML injection 1,900 Units  1,900 Units IntraCATHeter PRN    albuterol (PROVENTIL) (2.5 MG/3ML) 0.083% nebulizer solution 2.5 mg  2.5 mg Nebulization Q6H PRN    labetalol (NORMODYNE;TRANDATE) injection 20 mg  20 mg IntraVENous Q6H PRN    hydrALAZINE (APRESOLINE) injection 10 mg  10 mg IntraVENous Q6H PRN    albumin human 25% IV solution 25 g  25 g IntraVENous PRN    oxyCODONE (ROXICODONE) immediate release tablet 5 mg  5 mg Oral Q4H PRN    Or     oxyCODONE (ROXICODONE) immediate release tablet 10 mg  10 mg Oral Q4H PRN    glucose chewable tablet 16 g  4 tablet Oral PRN    dextrose bolus 10% 125 mL  125 mL IntraVENous PRN    Or    dextrose bolus 10% 250 mL  250 mL IntraVENous PRN    glucagon injection 1 mg  1 mg SubCUTAneous PRN    dextrose 10 % infusion   IntraVENous Continuous PRN    insulin lispro (HUMALOG) injection vial 0-8 Units  0-8 Units SubCUTAneous Q6H    0.9 % sodium chloride infusion   IntraVENous PRN    sodium chloride flush 0.9 % injection 5-40 mL  5-40 mL IntraVENous 2 times per day    sodium chloride flush 0.9 % injection 5-40 mL  5-40 mL IntraVENous PRN    0.9 % sodium chloride infusion   IntraVENous PRN    ondansetron (ZOFRAN-ODT) disintegrating tablet 4 mg  4 mg Oral Q8H PRN    Or    ondansetron (ZOFRAN) injection 4 mg  4 mg IntraVENous Q6H PRN    polyethylene glycol (GLYCOLAX) packet 17 g  17 g Oral Daily PRN    acetaminophen (TYLENOL) tablet 650 mg  650 mg Oral Q6H PRN    Or    acetaminophen (TYLENOL) suppository 650 mg  650 mg Rectal Q6H PRN      Allergies   Allergen Reactions    Haldol [Haloperidol] Hallucinations    Codeine Other (See Comments)    Sulfa Antibiotics Other (See Comments)       Objective:  Vitals:    Vitals:    04/22/24 0845 04/22/24 0900 04/22/24 0915 04/22/24 0930   BP: (!) 148/56 101/60 (!) 115/46 122/82   Pulse: 66 74 80 82   Resp:    24   Temp:       TempSrc:       SpO2:       Weight:       Height:         Intake and Output:  No intake/output data recorded.  No intake/output data recorded.    Physical Examination:  General: Awake, alert, NAD  Neck:  Supple, no mass  Resp:  Coarse Rhonchi BS b/l  CV:  RRR,  no murmur or rub, trace b/l LE edema  GI:  Soft, NT, + BS, no HS megaly  Neurologic:  Non focal  Skin:  No Rash  Dialysis Access :  right IJ    [x]    High complexity decision making was performed  [x]    Patient is at high-risk of decompensation with multiple organ involvement    Lab Data Personally Reviewed: I

## 2024-04-22 NOTE — PROGRESS NOTES
Physical Therapy 4/22/24    Chart reviewed, conferred with RN. Patient currently undergoing HD at bedside, will defer and follow up as able.    Thank you for your consideration,    Marija Pena, PT, DPT

## 2024-04-22 NOTE — PLAN OF CARE
Problem: Safety - Medical Restraint  Goal: Remains free of injury from restraints (Restraint for Interference with Medical Device)  Description: INTERVENTIONS:  1. Determine that other, less restrictive measures have been tried or would not be effective before applying the restraint  2. Evaluate the patient's condition at the time of restraint application  3. Inform patient/family regarding the reason for restraint  4. Q2H: Monitor safety, psychosocial status, comfort, nutrition and hydration  Outcome: Progressing     Problem: Safety - Adult  Goal: Free from fall injury  Outcome: Progressing     Problem: Discharge Planning  Goal: Discharge to home or other facility with appropriate resources  Outcome: Progressing  Flowsheets (Taken 4/21/2024 2130 by Frances Mckeon, RN)  Discharge to home or other facility with appropriate resources: Identify barriers to discharge with patient and caregiver     Problem: Pain  Goal: Verbalizes/displays adequate comfort level or baseline comfort level  Outcome: Progressing     Problem: Chronic Conditions and Co-morbidities  Goal: Patient's chronic conditions and co-morbidity symptoms are monitored and maintained or improved  Outcome: Progressing  Flowsheets (Taken 4/21/2024 2130 by Frances Mckeon, RN)  Care Plan - Patient's Chronic Conditions and Co-Morbidity Symptoms are Monitored and Maintained or Improved: Monitor and assess patient's chronic conditions and comorbid symptoms for stability, deterioration, or improvement     Problem: Skin/Tissue Integrity  Goal: Absence of new skin breakdown  Description: 1.  Monitor for areas of redness and/or skin breakdown  2.  Assess vascular access sites hourly  3.  Every 4-6 hours minimum:  Change oxygen saturation probe site  4.  Every 4-6 hours:  If on nasal continuous positive airway pressure, respiratory therapy assess nares and determine need for appliance change or resting period.  Outcome: Progressing     Problem: Nutrition

## 2024-04-22 NOTE — PROGRESS NOTES
Speech Therapy Contact Note    Chart reviewed. Case discussed with RN. Patient completing bedside HD at time of attempted session. Will follow-up as able/appropriate.    Parisa Scott, CCC-SLP

## 2024-04-22 NOTE — PLAN OF CARE
Problem: Occupational Therapy - Adult  Goal: By Discharge: Performs self-care activities at highest level of function for planned discharge setting.  See evaluation for individualized goals.  Description: FUNCTIONAL STATUS PRIOR TO ADMISSION:  Patient was ambulatory using a RW vs furniture surfing PTA. Per pt's daughter at bedside pt has had a progressive decline in functional mobility over the last year.     HOME SUPPORT: Patient lived with her family and was mod I with basic ADLs. She does not take full showers, instead elects to wash up at her sink.     Occupational Therapy Goals:  Initiated 4/18/2024  1.  Patient will perform grooming in supported sitting with Maximal Assist within 7 day(s).  2.  Patient will perform anterior neck to thigh bathing in supported sitting with Maximal Assist within 7 day(s).  3.  Patient will perform upper body dressing with Maximal Assist within 7 day(s).  4.  Patient will perform lateral rolling in prep for completion of toileting routine with Maximal Assist within 7 day(s).   5.  Patient will participate in upper extremity therapeutic exercise/activities with Supervision for 3 minutes within 7 day(s).        Outcome: Progressing     OCCUPATIONAL THERAPY TREATMENT    Patient: Rozina Doss (78 y.o. female)  Date: 4/22/2024  Primary Diagnosis: Hemorrhagic shock (HCC) [R57.8]       Precautions: Fall Risk                Chart, occupational therapy assessment, plan of care, and goals were reviewed.    ASSESSMENT  Patient continues to benefit from skilled OT services and is slowly progressing towards goals. Pt's performance of ADL/IADL tasks is limited at this time by impaired sitting balance, activity tolerance, generalized weakness, coordination, lethargy/fatigue, and cognition (command following, attention, insight, safety awareness, orientation).     Pt continues to require max to total A x2 to complete all bed mobility in prep for ADL tasks. She tolerated transfer to

## 2024-04-22 NOTE — PLAN OF CARE
Speech LAnguage Pathology TREATMENT    Patient: Rozina Doss (78 y.o. female)  Date: 4/22/2024  Primary Diagnosis: Hemorrhagic shock (HCC) [R57.8]    Precautions: Aspiration, Fall Risk                  ASSESSMENT:  Therapy targeted swallowing. PO trials of thin liquids, mildly thick liquids, moderately thick liquids, purees, and solids given. Patient with baseline wet cough response in absence of PO intake. Patient also with intermittent wet cough response following all liquid viscosities as well as after solids. Recommend repeat instrumental swallow study to re-assess swallow function given history of pharyngeal deficits on recent FEES. Will advance PO diet to minced/moist with moderately thick liquids pending swallow study findings/recommendations.     Patient will benefit from skilled intervention to address the above impairments.     PLAN :  Recommendations and Planned Interventions:  Diet: Minced and moist and moderately thick liquids  -Oral medications crushed in purees (Check with Pharmacy prior to crushing medications)  -Aspiration precautions: Upright position, small bites/sips, alternate bites/sips, check for oral residue  -Oral care at least 2-3 times daily    Recommend next SLP session: FEES at next visit    Acute SLP Services: Yes, SLP will continue to follow per plan of care.    Discharge Recommendations: Yes, recommend SLP treatment at next level of care     SUBJECTIVE:   Patient stated, “I'm really tired.” Patient seen after HD.    OBJECTIVE:     Past Medical History:   Diagnosis Date    Diabetes (HCC)     Hypertension     Tobacco use 4/8/2023    Type 2 diabetes mellitus (HCC) 4/8/2023     Past Surgical History:   Procedure Laterality Date    IR INSERT TUNNELED CV CATH WO PORT < 5YRS  4/19/2024    IR INSERT TUNNELED CV CATH WO PORT < 5YRS 4/19/2024 [facility] RAD ANGIO IR    IR VASC EMBOLIZE OCCLUDE ARTERY  4/9/2024    IR VASC EMBOLIZE OCCLUDE ARTERY 4/9/2024 [facility] RAD ANGIO IR     Baseline

## 2024-04-22 NOTE — PROGRESS NOTES
[facility]                                                                                          Hospitalist Progress Note  Sushma Madala, MD  Office Phone: [phone]        Date of Service:  2024  NAME:  Rozina Doss  :  1945  MRN:  [id number]       Admission Summary:   Rozina Doss is a 78 y.o. female who was initially admitted to [facility] on .  Patient initially presented with cough, malaise and weakness.  Subsequent developed flash pulmonary edema and required BiPAP.  Patient was admitted to ICU at [facility].  On , patient became significantly hypotensive, hypothermic and workup shows that hemoglobin has significantly dropped from 7.4-3.9.  CT angiogram of the abdomen and pelvis to enlarging right retroperitoneal hematoma with active bleeding.  Subsequently patient was intubated at the outside hospital, started on pressors, also began transfusion of 3 units PRBCs and 2 units FFP.  Patient subsequently transferred to [facility] for IR evaluation.  Patient was evaluated by IR upon arrival at [facility] and went for arteriogram which shows multiple right lumbar/retroperitoneal arteries showing active bleeding and subsequently embolization was performed.  Patient was intubated for the procedure and subsequently admitted to ICU.  Patient was able to be extubated on  and transition to BiPAP.  Patient also developed ARIC due to ATN from hemorrhagic shock and started on CRRT this admission and transition to HD MWF.  Nephrology following.  Patient has been seen by speech therapy and started on diet.  Patient transferred out of ICU today to assume care under the hospitalist service.        Interval history / Subjective:   Patient is seen and examined at bedside this AM. Pt feels ok. Undergoing HD. No overnight events. Eating better  Discussed with nursing, cm     Waiting on SNF placement   noted.      Admission Status:[id number]:::1}      Medications Reviewed:     Current Facility-Administered Medications   Medication Dose Route Frequency    balsum peru-castor oil (VENELEX) ointment   Topical TID    melatonin tablet 3 mg  3 mg Oral Nightly PRN    atorvastatin (LIPITOR) tablet 40 mg  40 mg Oral Daily    [Held by provider] insulin glargine (LANTUS) injection vial 12 Units  12 Units SubCUTAneous Daily    [Held by provider] furosemide (LASIX) tablet 20 mg  20 mg Oral Daily    [Held by provider] NIFEdipine (PROCARDIA XL) extended release tablet 30 mg  30 mg Oral Daily    heparin (porcine) 1000 UNIT/ML injection 1,900 Units  1,900 Units IntraCATHeter PRN    And    heparin (porcine) 1000 UNIT/ML injection 1,900 Units  1,900 Units IntraCATHeter PRN    albuterol (PROVENTIL) (2.5 MG/3ML) 0.083% nebulizer solution 2.5 mg  2.5 mg Nebulization Q6H PRN    labetalol (NORMODYNE;TRANDATE) injection 20 mg  20 mg IntraVENous Q6H PRN    hydrALAZINE (APRESOLINE) injection 10 mg  10 mg IntraVENous Q6H PRN    albumin human 25% IV solution 25 g  25 g IntraVENous PRN    oxyCODONE (ROXICODONE) immediate release tablet 5 mg  5 mg Oral Q4H PRN    Or    oxyCODONE (ROXICODONE) immediate release tablet 10 mg  10 mg Oral Q4H PRN    glucose chewable tablet 16 g  4 tablet Oral PRN    dextrose bolus 10% 125 mL  125 mL IntraVENous PRN    Or    dextrose bolus 10% 250 mL  250 mL IntraVENous PRN    glucagon injection 1 mg  1 mg SubCUTAneous PRN    dextrose 10 % infusion   IntraVENous Continuous PRN    insulin lispro (HUMALOG) injection vial 0-8 Units  0-8 Units SubCUTAneous Q6H    0.9 % sodium chloride infusion   IntraVENous PRN    sodium chloride flush 0.9 % injection 5-40 mL  5-40 mL IntraVENous 2 times per day    sodium chloride flush 0.9 % injection 5-40 mL  5-40 mL IntraVENous PRN    0.9 % sodium chloride infusion   IntraVENous PRN    ondansetron (ZOFRAN-ODT) disintegrating tablet 4 mg  4 mg Oral Q8H PRN    Or    ondansetron (ZOFRAN)

## 2024-04-22 NOTE — CARE COORDINATION
Medicaid LTSS Screening submitted for processing on the [facility] portal.      Ria Guerra LCSW [facility]  Care Manager   [phone]

## 2024-04-22 NOTE — PROGRESS NOTES
Occupational Therapy:     Chart reviewed, conferred with RN. Patient currently undergoing HD at bedside, will defer and follow up as able.    Thank you for your consideration,  LAURA Ruiz, OTR/L

## 2024-04-23 VITALS
HEART RATE: 82 BPM | TEMPERATURE: 97.8 F | SYSTOLIC BLOOD PRESSURE: 119 MMHG | DIASTOLIC BLOOD PRESSURE: 47 MMHG | OXYGEN SATURATION: 96 % | HEIGHT: 62 IN | WEIGHT: 148.3 LBS | BODY MASS INDEX: 27.29 KG/M2 | RESPIRATION RATE: 18 BRPM

## 2024-04-23 LAB
ANION GAP SERPL CALC-SCNC: 8 MMOL/L (ref 5–15)
BUN SERPL-MCNC: 60 MG/DL (ref 6–20)
BUN/CREAT SERPL: 13 (ref 12–20)
CALCIUM SERPL-MCNC: 8.3 MG/DL (ref 8.5–10.1)
CHLORIDE SERPL-SCNC: 100 MMOL/L (ref 97–108)
CO2 SERPL-SCNC: 27 MMOL/L (ref 21–32)
CREAT SERPL-MCNC: 4.72 MG/DL (ref 0.55–1.02)
ERYTHROCYTE [DISTWIDTH] IN BLOOD BY AUTOMATED COUNT: 17.5 % (ref 11.5–14.5)
GLUCOSE BLD STRIP.AUTO-MCNC: 143 MG/DL (ref 65–117)
GLUCOSE BLD STRIP.AUTO-MCNC: 212 MG/DL (ref 65–117)
GLUCOSE SERPL-MCNC: 138 MG/DL (ref 65–100)
HBV CORE AB SERPL QL IA: NEGATIVE
HBV CORE IGM SERPL QL IA: NEGATIVE
HCT VFR BLD AUTO: 25.1 % (ref 35–47)
HGB BLD-MCNC: 8.2 G/DL (ref 11.5–16)
MCH RBC QN AUTO: 31.8 PG (ref 26–34)
MCHC RBC AUTO-ENTMCNC: 32.7 G/DL (ref 30–36.5)
MCV RBC AUTO: 97.3 FL (ref 80–99)
NRBC # BLD: 0 K/UL (ref 0–0.01)
NRBC BLD-RTO: 0 PER 100 WBC
PLATELET # BLD AUTO: 194 K/UL (ref 150–400)
PMV BLD AUTO: 11.4 FL (ref 8.9–12.9)
POTASSIUM SERPL-SCNC: 4.5 MMOL/L (ref 3.5–5.1)
RBC # BLD AUTO: 2.58 M/UL (ref 3.8–5.2)
SERVICE CMNT-IMP: ABNORMAL
SERVICE CMNT-IMP: ABNORMAL
SODIUM SERPL-SCNC: 135 MMOL/L (ref 136–145)
WBC # BLD AUTO: 8.7 K/UL (ref 3.6–11)

## 2024-04-23 PROCEDURE — 6370000000 HC RX 637 (ALT 250 FOR IP): Performed by: INTERNAL MEDICINE

## 2024-04-23 PROCEDURE — 80048 BASIC METABOLIC PNL TOTAL CA: CPT

## 2024-04-23 PROCEDURE — 85027 COMPLETE CBC AUTOMATED: CPT

## 2024-04-23 PROCEDURE — 2580000003 HC RX 258: Performed by: NURSE PRACTITIONER

## 2024-04-23 PROCEDURE — 86704 HEP B CORE ANTIBODY TOTAL: CPT

## 2024-04-23 PROCEDURE — 6370000000 HC RX 637 (ALT 250 FOR IP): Performed by: STUDENT IN AN ORGANIZED HEALTH CARE EDUCATION/TRAINING PROGRAM

## 2024-04-23 PROCEDURE — 92612 ENDOSCOPY SWALLOW (FEES) VID: CPT

## 2024-04-23 PROCEDURE — 6360000002 HC RX W HCPCS: Performed by: NURSE PRACTITIONER

## 2024-04-23 PROCEDURE — 82962 GLUCOSE BLOOD TEST: CPT

## 2024-04-23 PROCEDURE — 36415 COLL VENOUS BLD VENIPUNCTURE: CPT

## 2024-04-23 RX ORDER — ATORVASTATIN CALCIUM 40 MG/1
40 TABLET, FILM COATED ORAL DAILY
Qty: 30 TABLET | Refills: 0 | Status: SHIPPED
Start: 2024-04-24

## 2024-04-23 RX ORDER — INSULIN LISPRO 100 [IU]/ML
0-8 INJECTION, SOLUTION INTRAVENOUS; SUBCUTANEOUS EVERY 6 HOURS
Qty: 1 ML | Refills: 0 | Status: SHIPPED
Start: 2024-04-23

## 2024-04-23 RX ADMIN — SODIUM CHLORIDE, PRESERVATIVE FREE 10 ML: 5 INJECTION INTRAVENOUS at 09:10

## 2024-04-23 RX ADMIN — Medication: at 09:08

## 2024-04-23 RX ADMIN — ATORVASTATIN CALCIUM 40 MG: 40 TABLET, FILM COATED ORAL at 09:08

## 2024-04-23 RX ADMIN — OXYCODONE 10 MG: 5 TABLET ORAL at 01:02

## 2024-04-23 RX ADMIN — ONDANSETRON 4 MG: 2 INJECTION INTRAMUSCULAR; INTRAVENOUS at 01:02

## 2024-04-23 RX ADMIN — Medication: at 12:10

## 2024-04-23 RX ADMIN — Medication 3 MG: at 01:02

## 2024-04-23 RX ADMIN — INSULIN LISPRO 2 UNITS: 100 INJECTION, SOLUTION INTRAVENOUS; SUBCUTANEOUS at 12:02

## 2024-04-23 ASSESSMENT — PAIN SCALES - GENERAL
PAINLEVEL_OUTOF10: 7
PAINLEVEL_OUTOF10: 0
PAINLEVEL_OUTOF10: 0

## 2024-04-23 NOTE — PROGRESS NOTES
Palliative Medicine  Patient Name: Rozina Doss  YOB: 1945  MRN: [id number]  Age: 78 y.o.  Gender: female    Date of Initial Consult: 4/16/2024  Date of Service: 4/22/2024  Time: 11:58 PM  Provider: MELISSA Castillo NP  Hospital Day: 14  Admit Date: 4/9/2024  Referring Provider: Dr. Steiner       Reasons for Consultation:  Goals of Care    HISTORY OF PRESENT ILLNESS (HPI):   Rozina Doss is a 78 y.o. female with a past medical history of CKD stage 3, DM2, HTN, tobacco use (quite late 2023), obesity, Multiple Falls rhabdomyolysis, debility who was admitted on 4/9/2024 from [facility] with a diagnosis of Severe anemia 2/2 bleed    4/1/2024 Ms. Butler presented to [facility] with CC of cough, fatigue, body aches and difficulty walking.  Reported legs locking up, that she has had numbness in bilateral legs times a few months.  Initially symptoms appear to be URI/viral related, IV fluids initiated, however after receiving only 500cc,  patient became very hypertensive and very SOB SOB w/ O2 sats dropping into 70s. Fluids stopped, repeat CXR no concerning for flash pulmonary edema.  Patient started on BiPAP, diuresed.    ECHO w EF 50-55%, grade 1 diastolic dysfunction    4/9 she became poorly responsive, hypothermic, bradycardic with rapid drop in hemoglobin.  Patient w/ hemorrhagic shock,  was intubated and transferred to ICU.  CT head negative for acute findings.  CT C/A/P demonstrated large right retroperitoneal hematoma, small bilateral pleural effusions with atelectasis and small groundglass tree-in-bud nodularities in left lung.  Received 3 units prbcs and 2 units FFFP, before being transferred to [facility].     4/9 patient transferred via medical flight to [facility] for embolization of right RP hemorrhage with IR.    Course of [facility] hospitalization:  4/9-patient underwent emergency retroperitoneal  embolization foll multiple coils.  Found multiple right lumbar/r

## 2024-04-23 NOTE — PROGRESS NOTES
SLP Contact Note    Will planning on completing Flexible Endoscopic Evaluation of Swallow (FEES) at bedside to objectively assess safety of swallow physiology. Full recommendations to follow.      Prema Romo M.Ed, PhD(c), CCC-SLP  Speech-Language Pathologist

## 2024-04-23 NOTE — PROGRESS NOTES
SLP Contact Note    SLP FEES complete. Pt with improvement in swallow function. Recommend soft/bite sized diet and thin liquids via small straw, single sips, pinch straw to ensure small sips. Full note to follow.      Prema Romo M.Ed, PhD(c), CCC-SLP  Speech-Language Pathologist

## 2024-04-23 NOTE — PROGRESS NOTES
Pt was discharged to SNF, discharge paperwork, F/U, and medications were discussed. Pt states no questions or concerns. Report called into [facility] with Jeanne Zaragoza.           Clair Donahue RN is in orientation with me and I agree with her charting.

## 2024-04-23 NOTE — CARE COORDINATION
Transition of Care Plan to SNF/Rehab    Communication to Patient/Family:  Met with patient and family and they are agreeable to the transition plan. The Plan for Transition of Care is related to the following treatment goals: SNF    The Patient and/or patient representative (Hyacinth Schaeffer) was provided with a choice of provider and agrees  with the discharge plan.      Yes [x] No []    A Freedom of choice list was provided with basic dialogue that supports the patient's individualized plan of care/goals and shares the quality data associated with the providers.       Yes [x] No []    SNF/Rehab Transition:  Patient has been accepted to [facility] SNF/Rehab and meets criteria for admission.   Patient will transported by [employer] and expected to leave at 3pm.    Communication to SNF/Rehab:  Bedside RN, Eula, has been notified to update the transition plan to the facility and call report ([phone]; Room 309 ).  Discharge information has been updated on the AVS. And communicated to facility via [employer], or CC link.     Discharge instructions to be fax'd to facility at (Fax #). [phone]     Nursing Please include all hard scripts for controlled substances, med rec and dc summary, and AVS in packet.     Reviewed and confirmed with facility, [facility], can manage the patient care needs for the following:     Ad with (X) only those applicable:  Medication:  [x]Medications are available at the facility  []IV Antibiotics    []Controlled Substance - hard copies available sent.  []Weekly Labs    Equipment:  []CPAP/BiPAP  []Wound Vacuum  []Davis or Urinary Device  []PICC/Central Line  []Nebulizer  []Ventilator    Treatment:  []Isolation (for MRSA, VRE, etc.)  []Surgical Drain Management  []Tracheostomy Care  []Dressing Changes  [x]Dialysis with transportation  []PEG Care  []Oxygen  []Daily Weights for Heart Failure Pt will be TTS schedule at [facility] HD [facility]   Dietary:  []Any diet

## 2024-04-23 NOTE — PROGRESS NOTES
[address], [facility] [place] [address]  Phone: [phone]   [phone]    [email or web address]     Nephrology Progress Note    Patient Name : Rozina Doss      : 1945     MRN : [id number]  Date of Admission : 2024  Date of Servive : 24    CC:  Follow up for ARIC on CKD       Assessment and Plan   ARIC on CKD IV:  - 2/2 ATN from hemorrhagic shock  - stable to start out pt HD on Thursday   -  PC placement  - out pt HD set up at \[facility]\""    CKD IV:  - baseline Cr around 2.5  - f/b [facility]     Hemorrhagic shock:  - Hgb stable  - transfused if hgb < 7     Shock liver:  - trend LFTs    RP bleed s/p coil ebmolization      DM2  Obesity  HTN     Interval History:  Seen and examined. Tolerated HD. Noted PLANS FOR d/c to SNF today   Out pt HD now set up as TTS       Review of Systems: Pertinent items are noted in HPI.    Current Medications:   Current Facility-Administered Medications   Medication Dose Route Frequency    balsum peru-castor oil (VENELEX) ointment   Topical TID    melatonin tablet 3 mg  3 mg Oral Nightly PRN    atorvastatin (LIPITOR) tablet 40 mg  40 mg Oral Daily    [Held by provider] insulin glargine (LANTUS) injection vial 12 Units  12 Units SubCUTAneous Daily    [Held by provider] furosemide (LASIX) tablet 20 mg  20 mg Oral Daily    [Held by provider] NIFEdipine (PROCARDIA XL) extended release tablet 30 mg  30 mg Oral Daily    heparin (porcine) 1000 UNIT/ML injection 1,900 Units  1,900 Units IntraCATHeter PRN    And    heparin (porcine) 1000 UNIT/ML injection 1,900 Units  1,900 Units IntraCATHeter PRN    albuterol (PROVENTIL) (2.5 MG/3ML) 0.083% nebulizer solution 2.5 mg  2.5 mg Nebulization Q6H PRN    labetalol (NORMODYNE;TRANDATE) injection 20 mg  20 mg IntraVENous Q6H PRN    hydrALAZINE (APRESOLINE) injection 10 mg  10 mg IntraVENous Q6H PRN    albumin human 25% IV solution 25 g  25

## 2024-04-23 NOTE — CARE COORDINATION
Transition of Care Plan:    SNF - [facility] - auth received/DC today  RN to call report to [phone]; Room 309  - [facility] willing to accept with Hep B total core pending    ([facility] selected on chart, however Pt will be going to [facility])    [facility] [address], [place] [address]  Liaison: Yokasta Gill Ph. [phone]; Fx. [phone]; email [email or web address]     [facility] referral sent 4/19  [facility] requested 4/19    Transport: [facility] 3pm    RUR: 26% High   Prior Level of Functioning: Independent   Disposition: SNF   If SNF or IPR: Date FOC offered: 4/18/24  Date FOC received: 4/18/24  Accepting facility: [facility] and rehab   DME needed: TBD  Transportation at discharge: BLS  IM/[facility] Medicare letter given: No  Is patient a [place] and connected with VA? No  Caregiver Contact: Daughter Hyacinth Laury [phone]  Discharge Caregiver contacted prior to discharge? Yes  Care Conference needed? No  Barriers to discharge:  None    Pt has received insurance auth through [employer], 4/22-4/24. Auth ID#[id number]; Care coordinator: Margie Matthew.    CM spoke with Yokasta with [facility] Admissions - they are willing to accept Pt today with Hep B Total core lab pending as Pt has already been accepted by HD Den. Pt must be admitted by 6pm. Pt will be TTS HD schedule while at [facility] - Dr. Posada has reached out to Nephro to verify if Pt needs short HD session today.    [facility] transport set for 3pm.     [facility] required for admission today. Awaiting signature. ARIEL reached out to [facility] team to verify if Pt has been screened for Medicaid.     12:15pm: [facility] sent to [facility] Admissions. Pt daughter, Hyacinth called for LTC Medicaid screening - did not complete screening. Hyacinth did not know whether Pt would need LTC. CM provided update to [facility] - SW will need to assist Pt in getting screened for Medicaid.    PATRICIA Guillory (Ally).

## 2024-04-23 NOTE — PLAN OF CARE
session: Diet check; advance solids?     Acute SLP Services: Yes, patient will be followed by speech-language pathology 3x/week to address goals. Patient's rehabilitation potential is considered to be Good.  Discharge Recommendations: Continue to assess pending progress     SUBJECTIVE:   Patient stated “Oh yeah it was a blast,” after SLP asked how the FEES went.    OBJECTIVE:     Past Medical History:   Diagnosis Date    Diabetes (HCC)     Hypertension     Tobacco use 4/8/2023    Type 2 diabetes mellitus (HCC) 4/8/2023     Past Surgical History:   Procedure Laterality Date    IR INSERT TUNNELED CV CATH WO PORT < 5YRS  4/19/2024    IR INSERT TUNNELED CV CATH WO PORT < 5YRS 4/19/2024 [facility]IO IR    IR VASC EMBOLIZE OCCLUDE ARTERY  4/9/2024    IR VASC EMBOLIZE OCCLUDE ARTERY 4/9/2024 [facility] IR     Prior Level of Function/Home Situation:   Social/Functional History  Lives With: Family  Type of Home: House  Home Layout: Able to Live on Main level with bedroom/bathroom, Two level  Home Access: Stairs to enter with rails  Entrance Stairs - Number of Steps: 4  Entrance Stairs - Rails: Right  Bathroom Shower/Tub:  (sponge bathes at the sink)  Home Equipment: Walker, rolling  Has the patient had two or more falls in the past year or any fall with injury in the past year?: No  Receives Help From: Family  ADL Assistance: Needs assistance  Bath: Maximum assistance  Dressing: Maximum assistance  Grooming: Moderate assistance  Feeding: Modified independent   Toileting: Needs assistance  Ambulation Assistance: Needs assistance  Transfer Assistance: Needs assistance  Active : No  Patient's  Info: Family  Mode of Transportation: Car  Occupation: Retired  Diet prior to admission: regular diet/thin liquids  Current Diet:  minced/moist diet and moderately-thick liquids     Cognitive and Communication Status:  Neurologic State: Alert  Orientation Level: Oriented x4  Cognition: Follows  goals.  Description: Speech Therapy Goals:  1. Patient will tolerate least restrictive diet without adverse effects within 7 days. Goal initiated 4/17.  2. Patient will participate in repeat instrumental swallow study to determine least restrictive diet. Goal initiated 4/22.   Outcome: Progressing

## 2024-04-23 NOTE — DISCHARGE SUMMARY
Discharge Summary       PATIENT ID: Rozina Doss  MRN: [id number]   YOB: 1945    DATE OF ADMISSION: 4/9/2024  6:16 PM    DATE OF DISCHARGE: 4/23/2024    PRIMARY CARE PROVIDER: Alvin Kaiser DO     ATTENDING PHYSICIAN: Dr. Posada   DISCHARGING PROVIDER: Sushma Madala, MD    To contact this individual call [phone] and ask the  to page.  If unavailable ask to be transferred the Adult Hospitalist Department.    CONSULTATIONS: IP CONSULT TO NEPHROLOGY  IP CONSULT TO PHARMACY  IP CONSULT TO DIETITIAN  IP CONSULT TO PALLIATIVE CARE  IP WOUND CARE NURSE CONSULT TO EVAL    PROCEDURES/SURGERIES: * No surgery found *    DIAGNOSES & HOSPITAL COURSE:   Per HPI:\"Rozina Doss is a 78 y.o. female who was initially admitted to [facility] on 4/1.  Patient initially presented with cough, malaise and weakness.  Subsequent developed flash pulmonary edema and required BiPAP.  Patient was admitted to ICU at [facility].  On 4/9, patient became significantly hypotensive, hypothermic and workup shows that hemoglobin has significantly dropped from 7.4-3.9.  CT angiogram of the abdomen and pelvis to enlarging right retroperitoneal hematoma with active bleeding.  Subsequently patient was intubated at the outside hospital, started on pressors, also began transfusion of 3 units PRBCs and 2 units FFP.  Patient subsequently transferred to [facility] for IR evaluation.  Patient was evaluated by IR upon arrival at [facility] and went for arteriogram which shows multiple right lumbar/retroperitoneal arteries showing active bleeding and subsequently embolization was performed.  Patient was intubated for the procedure and subsequently admitted to ICU.  Patient was able to be extubated on 4/16 and transition to BiPAP.  Patient also developed ARIC due to ATN from hemorrhagic shock and started on CRRT this admission and transition to HD MWF.  Nephrology following.

## 2024-04-23 NOTE — PALLIATIVE CARE DISCHARGE
Goals of Care/Treatment Preferences    The Palliative Medicine team was consulted as part of your/your loved one's care in the hospital. Our team is a supportive service; we strive to relieve suffering and improve quality of life.    We reviewed advance care planning information, which includes the following:    Primary Decision Maker: Hyacinth Schaeffer - Child - [phone]    Secondary Decision Maker: Jose DossJr - Child - [phone]    Secondary Decision Maker: LaurySera - Grandchild - [phone]  Patient's Healthcare Decision Maker is:: Legal Next of Kin  Confirm Advance Directive: Yes, on file      We reviewed / discussed your code status as:   Code Status: Full Code     “Full Code” means perform CPR in the event of cardiac arrest.      “DNR” means do NOT perform CPR in the event of cardiac arrest.      “Partial Code” means you have specific preferences, please discuss with your healthcare team.      “No Order” means this issue was not addressed / resolved during your stay      Because of the importance of this information, we are providing you with a printed copy to share with other healthcare providers after this hospitalization is complete.

## 2024-04-23 NOTE — PLAN OF CARE
Problem: Safety - Medical Restraint  Goal: Remains free of injury from restraints (Restraint for Interference with Medical Device)  Description: INTERVENTIONS:  1. Determine that other, less restrictive measures have been tried or would not be effective before applying the restraint  2. Evaluate the patient's condition at the time of restraint application  3. Inform patient/family regarding the reason for restraint  4. Q2H: Monitor safety, psychosocial status, comfort, nutrition and hydration  Outcome: Progressing     Problem: Safety - Adult  Goal: Free from fall injury  Outcome: Progressing     Problem: Discharge Planning  Goal: Discharge to home or other facility with appropriate resources  Outcome: Progressing     Problem: Pain  Goal: Verbalizes/displays adequate comfort level or baseline comfort level  Outcome: Progressing     Problem: Chronic Conditions and Co-morbidities  Goal: Patient's chronic conditions and co-morbidity symptoms are monitored and maintained or improved  Outcome: Progressing     Problem: Skin/Tissue Integrity  Goal: Absence of new skin breakdown  Description: 1.  Monitor for areas of redness and/or skin breakdown  2.  Assess vascular access sites hourly  3.  Every 4-6 hours minimum:  Change oxygen saturation probe site  4.  Every 4-6 hours:  If on nasal continuous positive airway pressure, respiratory therapy assess nares and determine need for appliance change or resting period.  Outcome: Progressing     Problem: Nutrition Deficit:  Goal: Optimize nutritional status  Outcome: Progressing     Problem: Occupational Therapy - Adult  Goal: By Discharge: Performs self-care activities at highest level of function for planned discharge setting.  See evaluation for individualized goals.  Description: FUNCTIONAL STATUS PRIOR TO ADMISSION:  Patient was ambulatory using a RW vs furniture surfing PTA. Per pt's daughter at bedside pt has had a progressive decline in functional mobility over the last

## 2024-04-23 NOTE — WOUND CARE
.WOCN Note:     New consult placed for assessment of sacrum and buttocks.    Chart reviewed.  Assessed in 663/01.  Daughter at the bedside.    Rozina Doss is a 78 y.o. y/o female who presented for Hemorrhagic shock  Admitted on 4/9/2024    Past Medical History:   Diagnosis Date    Diabetes (HCC)     Hypertension     Tobacco use 4/8/2023    Type 2 diabetes mellitus (HCC) 4/8/2023     Lab Results   Component Value Date/Time    WBC 11.7 (H) 04/19/2024 05:44 AM    LABA1C 6.3 (H) 04/10/2023 02:52 PM    POCGLU 117 04/19/2024 05:27 AM    POCGLU 109 04/19/2024 02:15 AM    HGB 8.8 (L) 04/19/2024 05:44 AM    HCT 26.8 (L) 04/19/2024 05:44 AM     04/19/2024 05:44 AM        Tobacco Use      Smoking status: Every Day      Smokeless tobacco: Never     Diet NPO     Assessment:   Patient is alert, communicative and requires assist with repositioning.    Bed: igor gel with hercules  GI/: incontinence pad   Patient reports no pain.   Patient repositioned on right side with pillow.  Heels offloaded with pillows.   Heels intact without erythema.      Wound Assessment   Sacrum and buttocks, non blanching maroon: scattered in a horseshoe shape over field 9 x 10 x 0 cm; 100% non blanching maroon; tender to touch.      Wound, Pressure Prevention & Skin Care Recommendations:    Minimize layers of linen/pads under patient to optimize support surface.    2.  Turn/reposition approximately every 2 hours and offload heels.   3.  Manage moisture/ Keep skin folds clean and dry/minimize brief usage.  4.  Specialty bed: immerse low air loss bed ordered. Confirmation [id number]. Use only flat sheet and one incontinence pad.  5.  Sacrum and buttocks:  Venelex TID and cover with sacral foam dressing.    Discussed above plan with patient/daughter &  RN.  Notified Dr Posada.    Transition of Care:   Plan to follow as needed while admitted to hospital.    NGA WatkinsN RN [facility] Inpatient Wound [facility]  Available on Perfect 
sacral foam dressing.    Discussed above plan with patient & RN.    Transition of Care:   Plan to follow as needed while admitted to hospital.    NGA WatkinsN RN [facility] Inpatient Wound Care  Available on [facility]  Office [phone]

## 2024-04-23 NOTE — PLAN OF CARE
Problem: Safety - Medical Restraint  Goal: Remains free of injury from restraints (Restraint for Interference with Medical Device)  Description: INTERVENTIONS:  1. Determine that other, less restrictive measures have been tried or would not be effective before applying the restraint  2. Evaluate the patient's condition at the time of restraint application  3. Inform patient/family regarding the reason for restraint  4. Q2H: Monitor safety, psychosocial status, comfort, nutrition and hydration  4/23/2024 1133 by Clair Donahue RN  Outcome: Adequate for Discharge  4/23/2024 1018 by Clair Donahue RN  Outcome: Progressing  4/23/2024 0420 by Wilner Hernandez RN  Outcome: Progressing     Problem: Safety - Adult  Goal: Free from fall injury  4/23/2024 1133 by Clair Donahue RN  Outcome: Adequate for Discharge  4/23/2024 1018 by Clair Donahue RN  Outcome: Progressing  4/23/2024 0420 by Wilner Hernandez RN  Outcome: Progressing     Problem: Discharge Planning  Goal: Discharge to home or other facility with appropriate resources  4/23/2024 1133 by Clair Donahue RN  Outcome: Adequate for Discharge  4/23/2024 1018 by Clair Donahue RN  Outcome: Progressing  Flowsheets (Taken 4/23/2024 0800)  Discharge to home or other facility with appropriate resources: (Simultaneous filing. User may not have seen previous data.)   Identify barriers to discharge with patient and caregiver   Arrange for needed discharge resources and transportation as appropriate   Identify discharge learning needs (meds, wound care, etc)   Arrange for interpreters to assist at discharge as needed   Refer to discharge planning if patient needs post-hospital services based on physician order or complex needs related to functional status, cognitive ability or social support system  4/23/2024 0420 by Wilner Hernandez RN  Outcome: Progressing     Problem: Pain  Goal: Verbalizes/displays adequate comfort level or baseline comfort level  4/23/2024 1133 by Clair Donahue  RN  Outcome: Adequate for Discharge  4/23/2024 1018 by Clair Donahue RN  Outcome: Progressing  4/23/2024 0420 by Wilner Hernandez RN  Outcome: Progressing     Problem: Chronic Conditions and Co-morbidities  Goal: Patient's chronic conditions and co-morbidity symptoms are monitored and maintained or improved  4/23/2024 1133 by Clair Donahue RN  Outcome: Adequate for Discharge  4/23/2024 1018 by Clair Donahue RN  Outcome: Progressing  Flowsheets (Taken 4/23/2024 0800)  Care Plan - Patient's Chronic Conditions and Co-Morbidity Symptoms are Monitored and Maintained or Improved: (Simultaneous filing. User may not have seen previous data.)   Monitor and assess patient's chronic conditions and comorbid symptoms for stability, deterioration, or improvement   Collaborate with multidisciplinary team to address chronic and comorbid conditions and prevent exacerbation or deterioration   Update acute care plan with appropriate goals if chronic or comorbid symptoms are exacerbated and prevent overall improvement and discharge  4/23/2024 0420 by Wilner Hernandez RN  Outcome: Progressing     Problem: Skin/Tissue Integrity  Goal: Absence of new skin breakdown  Description: 1.  Monitor for areas of redness and/or skin breakdown  2.  Assess vascular access sites hourly  3.  Every 4-6 hours minimum:  Change oxygen saturation probe site  4.  Every 4-6 hours:  If on nasal continuous positive airway pressure, respiratory therapy assess nares and determine need for appliance change or resting period.  4/23/2024 1133 by Clair Donahue RN  Outcome: Adequate for Discharge  4/23/2024 1018 by Clair Donahue RN  Outcome: Progressing  4/23/2024 0420 by Wilner Hernandez, RN  Outcome: Progressing     Problem: Nutrition Deficit:  Goal: Optimize nutritional status  4/23/2024 1133 by Clair Donahue RN  Outcome: Adequate for Discharge  4/23/2024 1018 by Clair Donahue RN  Outcome: Progressing  4/23/2024 0420 by Wilner Hernandez, RN  Outcome: Progressing

## 2024-04-23 NOTE — PROGRESS NOTES
Hospital Course  4/1: presented with SOB, weakness, congestion. Pulmonary edema suspect related to CKD and CHF. Hypertension (uncontrolled). Admitted to ICU on BiPAP (wean off as tolerated).  4/9: pt became significantly hypotensive, hypothermic and hemoglobin dropped from 7.4-3.9. CT angiogram of abdomen/pelvis to enlarging R retroperitoneal hematoma with active bleeding. Intubated. Arteriogram shows multiple R lumbar/retroperitoneal arteries showsing active bleeding, embolization performed.   4/16: pt extubated and transitioned to BiPAP. (4/9-4/16)  4/17: FEES complete. Pureed solids and mod thick liquids      Prandial Aspiration Risk Factors: intubation three days or longer and overall weakness and debility due to complex medical course  Silent Aspiration Risk Factors: intubation three days or longer  Post-Prandial Aspiration Risk Factors:  Type II DM      Pertinent Imaging and Labs:  4/3 MRI Lumbar Spine wo contrast  1. Significantly degraded secondary to patient motion artifact.  2. Chronic moderate compression deformity of T12. No acute fracture.  3. Multilevel spondylosis. Spinal stenosis is worst at L4-5.  4/3 MRI Pelvis wo Contrast  1.  Study significantly degraded by motion.  2.  No acute fracture.  3.  Degenerative joint disease as outlined above.  4.  LEFT gluteus minimus tendon partial tear.  5.  Incidental findings as above.  6.  MRI lumbar spine report dictated separately.  4/9 CT Chest Abdomen Pelvis wo Contrast  1.  Large right retroperitoneal hematoma measuring up to 16 cm, with an  approximate volume of 940 cc. If there is concern for active bleeding, would recommend CTA.  2. Small pleural effusions and bibasilar atelectasis.  3. Few small areas of groundglass and tree-in-bud nodular opacities in the left lung may reflect aspiration or small area of infection.  4/9 CTA Abdomen and Pelvis w Contrast  Enlarging right retroperitoneal hematoma with active bleeding.       WBC: 8.7 trending down

## 2024-04-23 NOTE — PLAN OF CARE
Problem: Safety - Medical Restraint  Goal: Remains free of injury from restraints (Restraint for Interference with Medical Device)  Description: INTERVENTIONS:  1. Determine that other, less restrictive measures have been tried or would not be effective before applying the restraint  2. Evaluate the patient's condition at the time of restraint application  3. Inform patient/family regarding the reason for restraint  4. Q2H: Monitor safety, psychosocial status, comfort, nutrition and hydration  4/23/2024 1018 by Clair Donahue RN  Outcome: Progressing  4/23/2024 0420 by Wilner Hernandez RN  Outcome: Progressing     Problem: Safety - Adult  Goal: Free from fall injury  4/23/2024 1018 by Clair Donahue RN  Outcome: Progressing  4/23/2024 0420 by Wilner Hernandez RN  Outcome: Progressing     Problem: Discharge Planning  Goal: Discharge to home or other facility with appropriate resources  4/23/2024 1018 by Clair Donahue RN  Outcome: Progressing  4/23/2024 0420 by Wilner Hernandez RN  Outcome: Progressing     Problem: Pain  Goal: Verbalizes/displays adequate comfort level or baseline comfort level  4/23/2024 1018 by Clair Donahue RN  Outcome: Progressing  4/23/2024 0420 by Wilner Hernandez RN  Outcome: Progressing     Problem: Chronic Conditions and Co-morbidities  Goal: Patient's chronic conditions and co-morbidity symptoms are monitored and maintained or improved  4/23/2024 1018 by Clair Donahue RN  Outcome: Progressing  4/23/2024 0420 by Wilner Hernandez RN  Outcome: Progressing     Problem: Skin/Tissue Integrity  Goal: Absence of new skin breakdown  Description: 1.  Monitor for areas of redness and/or skin breakdown  2.  Assess vascular access sites hourly  3.  Every 4-6 hours minimum:  Change oxygen saturation probe site  4.  Every 4-6 hours:  If on nasal continuous positive airway pressure, respiratory therapy assess nares and determine need for appliance change or resting period.  4/23/2024 1018 by Clair Donahue

## 2024-04-23 NOTE — PLAN OF CARE
Problem: Safety - Medical Restraint  Goal: Remains free of injury from restraints (Restraint for Interference with Medical Device)  Description: INTERVENTIONS:  1. Determine that other, less restrictive measures have been tried or would not be effective before applying the restraint  2. Evaluate the patient's condition at the time of restraint application  3. Inform patient/family regarding the reason for restraint  4. Q2H: Monitor safety, psychosocial status, comfort, nutrition and hydration  4/23/2024 0420 by Wilner Hernandez RN  Outcome: Progressing     Problem: Safety - Adult  Goal: Free from fall injury  4/23/2024 0420 by Wilner Hernandez RN  Outcome: Progressing     Problem: Discharge Planning  Goal: Discharge to home or other facility with appropriate resources  4/23/2024 0420 by Wilner Hernandez RN  Outcome: Progressing     Problem: Pain  Goal: Verbalizes/displays adequate comfort level or baseline comfort level  4/23/2024 0420 by Wilner Hernandez RN  Outcome: Progressing     Problem: Chronic Conditions and Co-morbidities  Goal: Patient's chronic conditions and co-morbidity symptoms are monitored and maintained or improved  4/23/2024 0420 by Wilner Hernandez RN  Outcome: Progressing     Problem: Skin/Tissue Integrity  Goal: Absence of new skin breakdown  Description: 1.  Monitor for areas of redness and/or skin breakdown  2.  Assess vascular access sites hourly  3.  Every 4-6 hours minimum:  Change oxygen saturation probe site  4.  Every 4-6 hours:  If on nasal continuous positive airway pressure, respiratory therapy assess nares and determine need for appliance change or resting period.  4/23/2024 0420 by Wilner Hernandez RN  Outcome: Progressing     Problem: Nutrition Deficit:  Goal: Optimize nutritional status  4/23/2024 0420 by Wilner Hernandez RN  Outcome: Progressing

## 2024-04-24 LAB — HBV CORE AB SERPL QL IA: NEGATIVE
